# Patient Record
Sex: FEMALE | Race: BLACK OR AFRICAN AMERICAN | NOT HISPANIC OR LATINO
[De-identification: names, ages, dates, MRNs, and addresses within clinical notes are randomized per-mention and may not be internally consistent; named-entity substitution may affect disease eponyms.]

---

## 2017-01-20 ENCOUNTER — APPOINTMENT (OUTPATIENT)
Dept: INTERNAL MEDICINE | Facility: CLINIC | Age: 72
End: 2017-01-20

## 2017-11-27 ENCOUNTER — LABORATORY RESULT (OUTPATIENT)
Age: 72
End: 2017-11-27

## 2017-11-27 ENCOUNTER — APPOINTMENT (OUTPATIENT)
Dept: INTERNAL MEDICINE | Facility: CLINIC | Age: 72
End: 2017-11-27

## 2017-11-27 ENCOUNTER — APPOINTMENT (OUTPATIENT)
Dept: INTERNAL MEDICINE | Facility: CLINIC | Age: 72
End: 2017-11-27
Payer: MEDICARE

## 2017-11-27 VITALS
OXYGEN SATURATION: 98 % | HEIGHT: 66 IN | SYSTOLIC BLOOD PRESSURE: 190 MMHG | HEART RATE: 91 BPM | BODY MASS INDEX: 38.25 KG/M2 | DIASTOLIC BLOOD PRESSURE: 110 MMHG | WEIGHT: 238 LBS | TEMPERATURE: 97.9 F

## 2017-11-27 DIAGNOSIS — K59.09 OTHER CONSTIPATION: ICD-10-CM

## 2017-11-27 PROCEDURE — 90662 IIV NO PRSV INCREASED AG IM: CPT

## 2017-11-27 PROCEDURE — G0439: CPT

## 2017-11-27 PROCEDURE — 36415 COLL VENOUS BLD VENIPUNCTURE: CPT

## 2017-11-27 PROCEDURE — G0008: CPT

## 2017-11-28 ENCOUNTER — MEDICATION RENEWAL (OUTPATIENT)
Age: 72
End: 2017-11-28

## 2017-11-28 DIAGNOSIS — R31.29 OTHER MICROSCOPIC HEMATURIA: ICD-10-CM

## 2017-11-28 LAB
25(OH)D3 SERPL-MCNC: 22.9 NG/ML
APPEARANCE: ABNORMAL
BILIRUBIN URINE: NEGATIVE
BLOOD URINE: ABNORMAL
CHOLEST SERPL-MCNC: 162 MG/DL
CHOLEST/HDLC SERPL: 3.6 RATIO
COLOR: YELLOW
GLUCOSE QUALITATIVE U: NEGATIVE MG/DL
HBA1C MFR BLD HPLC: 5.9 %
HDLC SERPL-MCNC: 45 MG/DL
KETONES URINE: NEGATIVE
LDLC SERPL CALC-MCNC: 93 MG/DL
LEUKOCYTE ESTERASE URINE: NEGATIVE
NITRITE URINE: NEGATIVE
PH URINE: 8.5
PROTEIN URINE: 100 MG/DL
SPECIFIC GRAVITY URINE: 1.01
TRIGL SERPL-MCNC: 121 MG/DL
TSH SERPL-ACNC: 3.35 UIU/ML
UROBILINOGEN URINE: 1 MG/DL
VIT B12 SERPL-MCNC: 717 PG/ML

## 2017-12-08 LAB
ALBUMIN SERPL ELPH-MCNC: 4.1 G/DL
ALP BLD-CCNC: 191 U/L
ALT SERPL-CCNC: 21 U/L
ANION GAP SERPL CALC-SCNC: 17 MMOL/L
AST SERPL-CCNC: 18 U/L
BASOPHILS # BLD AUTO: 0.02 K/UL
BASOPHILS NFR BLD AUTO: 0.3 %
BILIRUB SERPL-MCNC: 0.5 MG/DL
BUN SERPL-MCNC: 11 MG/DL
CALCIUM SERPL-MCNC: 9.5 MG/DL
CHLORIDE SERPL-SCNC: 102 MMOL/L
CO2 SERPL-SCNC: 26 MMOL/L
CREAT SERPL-MCNC: 0.95 MG/DL
EOSINOPHIL # BLD AUTO: 0.38 K/UL
EOSINOPHIL NFR BLD AUTO: 5.9 %
GLUCOSE SERPL-MCNC: 112 MG/DL
HCT VFR BLD CALC: 40.6 %
HGB BLD-MCNC: 12.9 G/DL
IMM GRANULOCYTES NFR BLD AUTO: 0 %
LYMPHOCYTES # BLD AUTO: 1.59 K/UL
LYMPHOCYTES NFR BLD AUTO: 24.8 %
MAN DIFF?: NORMAL
MCHC RBC-ENTMCNC: 26.9 PG
MCHC RBC-ENTMCNC: 31.8 GM/DL
MCV RBC AUTO: 84.8 FL
MONOCYTES # BLD AUTO: 0.45 K/UL
MONOCYTES NFR BLD AUTO: 7 %
NEUTROPHILS # BLD AUTO: 3.98 K/UL
NEUTROPHILS NFR BLD AUTO: 62 %
PLATELET # BLD AUTO: 299 K/UL
POTASSIUM SERPL-SCNC: 4 MMOL/L
PROT SERPL-MCNC: 8.1 G/DL
RBC # BLD: 4.79 M/UL
RBC # FLD: 15.8 %
SODIUM SERPL-SCNC: 145 MMOL/L
WBC # FLD AUTO: 6.42 K/UL

## 2019-01-09 ENCOUNTER — APPOINTMENT (OUTPATIENT)
Dept: INTERNAL MEDICINE | Facility: CLINIC | Age: 74
End: 2019-01-09
Payer: MEDICARE

## 2019-01-09 VITALS
BODY MASS INDEX: 38.25 KG/M2 | WEIGHT: 238 LBS | DIASTOLIC BLOOD PRESSURE: 84 MMHG | HEIGHT: 66 IN | HEART RATE: 93 BPM | SYSTOLIC BLOOD PRESSURE: 130 MMHG | OXYGEN SATURATION: 97 %

## 2019-01-09 DIAGNOSIS — R21 RASH AND OTHER NONSPECIFIC SKIN ERUPTION: ICD-10-CM

## 2019-01-09 PROCEDURE — G0444 DEPRESSION SCREEN ANNUAL: CPT | Mod: 59

## 2019-01-09 PROCEDURE — G0439: CPT

## 2019-01-09 PROCEDURE — 36415 COLL VENOUS BLD VENIPUNCTURE: CPT

## 2019-01-09 PROCEDURE — G0008: CPT

## 2019-01-09 PROCEDURE — 90662 IIV NO PRSV INCREASED AG IM: CPT

## 2019-01-09 NOTE — HISTORY OF PRESENT ILLNESS
[Spouse] : spouse [FreeTextEntry1] : rash on rt arm and shoulder and upper back x 2 -3 yrs \par -itching - started [place] \[place] - has not used any creams etc \par \par c/o pain in left knee x 6-7 months no trauma , swelling + in evening \par - pain worse with walking and climbing stairs \par \par Hypertensive -\par -home readings 130-140/80 \par -  compliant with medication and diet, no cp, sob, no palpitations. No dizzy spells.\par \par  increase urine frequency \par -no burning or dysuria \par -limiting Po fluids after 7 pm \par \par Hyperlipidemia --on atorvastatin 20 mg compliant with medications and diet,. Denies any muscle pain. \par \par Cva 2012 rt hemeparesis-- stopped going to  [facility] minimal physical activity now , gained weight, used to socialize there , and helps her strengthen her body. Now drives car by self. Does arthritis exercise. Walks with cane \par Gained weight \par All are senior citizens. Now walking without cane as much as possible.\par Adls basic and instrumental are coming back - doing things by self, banking, shopping, driving. Lives with . @ daughters in [place] , son in [place]. \par \par pre dm -eats rice daily , no bread , potato and fruits , has decreased physical activity \par  \par

## 2019-01-09 NOTE — ASSESSMENT
[FreeTextEntry1] : \par  rash rt arm shoulder and upper back \par - ? bed bugs \par - to see dermatology \par - trial of clobetasone cream \par \par left knee pain \par - get xray knee r/o OA \par - Pt referral if no help ortho \par - takes as needed alleve \par \par increased urine frequency \par - check UA and C/S \par - reduce fluid intake and caffine intake in evening \par \par History of CVA with right hemiparesis -stable, patient trying to be independent as much as possible, walking with walker , continue current medication advice to be physically active as much as possible. advised to join  [employer] programs- will need acces a ride help for transportation as next [facility]Us stop is 2 blocks and cannot walk long distances \par -continue aspirin, educated patient to have a better blood pressure control.\par \par Hypertension -elevated today reading confirmed 140/86 - as did not take am medications , home readings as per pt 130-140/80 \par - continue current medications, low sodium-DASH diet, Educated patient on avoiding canned food process food fast food, including 3-4 servings of fruits and vegetables a day.\par \par Hyperlipidemia\par Controlled, continue current medications, check lipid levels\par Low-fat diet, avoid red meat, cheese, butter, peanuts and exercise daily for 40 minutes.\par \par lower back pain-stable, advised patient to lose weight, exercise daily \par \par Pre Dm- increase Physical activity and eat low carb diet , loose weight , check AIC \par \par Health maintainance \par Flu vaccination - given today \par Colonoscope 6/2014 \par Mammogram-2014, referral given.again \par Tetanus vaccine- 2014\par Pneumovax -2014\par Prevnar 13 - given 2015\par zostavax -pt will check with insurance for coverage and let me know next visit. \par

## 2019-01-09 NOTE — HEALTH RISK ASSESSMENT
[Fair] :  ~his/her~ mood as fair [No falls in past year] : Patient reported no falls in the past year [0] : 2) Feeling down, depressed, or hopeless: Not at all (0) [None] : None [With Significant Other] : lives with significant other [Retired] : retired [] :  [Fully functional (bathing, dressing, toileting, transferring, walking, feeding)] : Fully functional (bathing, dressing, toileting, transferring, walking, feeding) [Fully functional (using the telephone, shopping, preparing meals, housekeeping, doing laundry, using] : Fully functional and needs no help or supervision to perform IADLs (using the telephone, shopping, preparing meals, housekeeping, doing laundry, using transportation, managing medications and managing finances) [] : No [de-identified] : none  [[id number]] : 0 [Reports changes in hearing] : Reports no changes in hearing [Reports changes in vision] : Reports no changes in vision [Reports changes in dental health] : Reports no changes in dental health

## 2019-01-10 ENCOUNTER — RX RENEWAL (OUTPATIENT)
Age: 74
End: 2019-01-10

## 2019-01-11 LAB
25(OH)D3 SERPL-MCNC: 20.7 NG/ML
ALBUMIN SERPL ELPH-MCNC: 4 G/DL
ALP BLD-CCNC: 186 U/L
ALT SERPL-CCNC: 14 U/L
ANION GAP SERPL CALC-SCNC: 14 MMOL/L
AST SERPL-CCNC: 14 U/L
BASOPHILS # BLD AUTO: 0.03 K/UL
BASOPHILS NFR BLD AUTO: 0.4 %
BILIRUB SERPL-MCNC: 0.4 MG/DL
BUN SERPL-MCNC: 19 MG/DL
CALCIUM SERPL-MCNC: 9.2 MG/DL
CHLORIDE SERPL-SCNC: 104 MMOL/L
CHOLEST SERPL-MCNC: 117 MG/DL
CHOLEST/HDLC SERPL: 3.2 RATIO
CO2 SERPL-SCNC: 23 MMOL/L
CREAT SERPL-MCNC: 0.98 MG/DL
EOSINOPHIL # BLD AUTO: 0.42 K/UL
EOSINOPHIL NFR BLD AUTO: 6.1 %
ESTIMATED AVERAGE GLUCOSE: 134 MG/DL
GLUCOSE SERPL-MCNC: 138 MG/DL
HBA1C MFR BLD HPLC: 6.3 %
HCT VFR BLD CALC: 40 %
HDLC SERPL-MCNC: 37 MG/DL
HGB BLD-MCNC: 12.6 G/DL
IMM GRANULOCYTES NFR BLD AUTO: 0.1 %
LDLC SERPL CALC-MCNC: 64 MG/DL
LYMPHOCYTES # BLD AUTO: 2 K/UL
LYMPHOCYTES NFR BLD AUTO: 29 %
MAN DIFF?: NORMAL
MCHC RBC-ENTMCNC: 27.2 PG
MCHC RBC-ENTMCNC: 31.5 GM/DL
MCV RBC AUTO: 86.4 FL
MONOCYTES # BLD AUTO: 0.38 K/UL
MONOCYTES NFR BLD AUTO: 5.5 %
NEUTROPHILS # BLD AUTO: 4.06 K/UL
NEUTROPHILS NFR BLD AUTO: 58.9 %
PLATELET # BLD AUTO: 302 K/UL
POTASSIUM SERPL-SCNC: 3.7 MMOL/L
PROT SERPL-MCNC: 7.5 G/DL
RBC # BLD: 4.63 M/UL
RBC # FLD: 15.9 %
SODIUM SERPL-SCNC: 141 MMOL/L
TRIGL SERPL-MCNC: 82 MG/DL
TSH SERPL-ACNC: 2.61 UIU/ML
VIT B12 SERPL-MCNC: 642 PG/ML
WBC # FLD AUTO: 6.9 K/UL

## 2019-01-13 ENCOUNTER — FORM ENCOUNTER (OUTPATIENT)
Age: 74
End: 2019-01-13

## 2019-01-13 DIAGNOSIS — R93.89 ABNORMAL FINDINGS ON DIAGNOSTIC IMAGING OF OTHER SPECIFIED BODY STRUCTURES: ICD-10-CM

## 2019-01-14 ENCOUNTER — APPOINTMENT (OUTPATIENT)
Dept: RADIOLOGY | Facility: IMAGING CENTER | Age: 74
End: 2019-01-14
Payer: MEDICARE

## 2019-01-14 ENCOUNTER — APPOINTMENT (OUTPATIENT)
Dept: DERMATOLOGY | Facility: CLINIC | Age: 74
End: 2019-01-14
Payer: MEDICARE

## 2019-01-14 ENCOUNTER — OUTPATIENT (OUTPATIENT)
Dept: OUTPATIENT SERVICES | Facility: HOSPITAL | Age: 74
LOS: 1 days | End: 2019-01-14
Payer: MEDICARE

## 2019-01-14 ENCOUNTER — APPOINTMENT (OUTPATIENT)
Dept: MAMMOGRAPHY | Facility: IMAGING CENTER | Age: 74
End: 2019-01-14
Payer: MEDICARE

## 2019-01-14 VITALS — DIASTOLIC BLOOD PRESSURE: 80 MMHG | SYSTOLIC BLOOD PRESSURE: 140 MMHG

## 2019-01-14 DIAGNOSIS — Z00.8 ENCOUNTER FOR OTHER GENERAL EXAMINATION: ICD-10-CM

## 2019-01-14 DIAGNOSIS — M25.562 PAIN IN LEFT KNEE: ICD-10-CM

## 2019-01-14 DIAGNOSIS — L30.9 DERMATITIS, UNSPECIFIED: ICD-10-CM

## 2019-01-14 PROCEDURE — 77063 BREAST TOMOSYNTHESIS BI: CPT

## 2019-01-14 PROCEDURE — 73564 X-RAY EXAM KNEE 4 OR MORE: CPT | Mod: 26,LT

## 2019-01-14 PROCEDURE — 99203 OFFICE O/P NEW LOW 30 MIN: CPT

## 2019-01-14 PROCEDURE — 77067 SCR MAMMO BI INCL CAD: CPT | Mod: 26

## 2019-01-14 PROCEDURE — 73564 X-RAY EXAM KNEE 4 OR MORE: CPT

## 2019-01-14 PROCEDURE — 77067 SCR MAMMO BI INCL CAD: CPT

## 2019-01-14 PROCEDURE — 77063 BREAST TOMOSYNTHESIS BI: CPT | Mod: 26

## 2019-01-14 NOTE — PHYSICAL EXAM
[Alert] : alert [Oriented x 3] : ~L oriented x 3 [Well Nourished] : well nourished [Conjunctiva Non-injected] : conjunctiva non-injected [No Visual Lymphadenopathy] : no visual  lymphadenopathy [No Clubbing] : no clubbing [No Edema] : no edema [No Chromhidrosis] : no chromhidrosis [No Bromhidrosis] : no bromhidrosis [FreeTextEntry3] : - Scaly brown round plaques scattered on lateral arms and upper back\par - Diffuse moderate xerosis

## 2019-01-14 NOTE — HISTORY OF PRESENT ILLNESS
[FreeTextEntry1] : new pt: rash [de-identified] : 74 y/o F w/ rash on upper body present for 4-5 years which is pruritic. Has used a variety of OTC products on the skin including Jergens Ultra Healing lotion. 1 week ago was started on clobetasol which led to some improvement but continues to get itching several hours after using the medicine. No hx of eczema or cervical disc disease.

## 2019-01-14 NOTE — CONSULT LETTER
[Dear  ___] : Dear  [unfilled], [Consult Letter:] : I had the pleasure of evaluating your patient, [unfilled]. [Please see my note below.] : Please see my note below. [Consult Closing:] : Thank you very much for allowing me to participate in the care of this patient.  If you have any questions, please do not hesitate to contact me. [Sincerely,] : Sincerely, [FreeTextEntry3] : Luis Gan MD\par [facility]

## 2019-01-24 PROBLEM — R93.89 NODULAR RADIOLOGIC DENSITY: Status: ACTIVE | Noted: 2019-01-24

## 2019-02-19 ENCOUNTER — APPOINTMENT (OUTPATIENT)
Dept: ORTHOPEDIC SURGERY | Facility: CLINIC | Age: 74
End: 2019-02-19
Payer: MEDICARE

## 2019-02-19 VITALS
BODY MASS INDEX: 28.32 KG/M2 | WEIGHT: 170 LBS | HEART RATE: 64 BPM | HEIGHT: 65 IN | SYSTOLIC BLOOD PRESSURE: 145 MMHG | DIASTOLIC BLOOD PRESSURE: 73 MMHG

## 2019-02-19 DIAGNOSIS — M25.562 PAIN IN LEFT KNEE: ICD-10-CM

## 2019-02-19 PROCEDURE — 99204 OFFICE O/P NEW MOD 45 MIN: CPT

## 2019-02-20 PROBLEM — M25.562 LEFT KNEE PAIN: Status: ACTIVE | Noted: 2019-01-09

## 2019-03-11 ENCOUNTER — APPOINTMENT (OUTPATIENT)
Dept: DERMATOLOGY | Facility: CLINIC | Age: 74
End: 2019-03-11

## 2019-06-19 ENCOUNTER — MEDICATION RENEWAL (OUTPATIENT)
Age: 74
End: 2019-06-19

## 2019-08-20 ENCOUNTER — NON-APPOINTMENT (OUTPATIENT)
Age: 74
End: 2019-08-20

## 2019-08-20 ENCOUNTER — APPOINTMENT (OUTPATIENT)
Dept: INTERNAL MEDICINE | Facility: CLINIC | Age: 74
End: 2019-08-20
Payer: MEDICARE

## 2019-08-20 VITALS
DIASTOLIC BLOOD PRESSURE: 82 MMHG | OXYGEN SATURATION: 98 % | HEART RATE: 86 BPM | HEIGHT: 65 IN | TEMPERATURE: 98.2 F | SYSTOLIC BLOOD PRESSURE: 130 MMHG | BODY MASS INDEX: 41.32 KG/M2 | WEIGHT: 248 LBS | RESPIRATION RATE: 16 BRPM

## 2019-08-20 DIAGNOSIS — J30.9 ALLERGIC RHINITIS, UNSPECIFIED: ICD-10-CM

## 2019-08-20 PROCEDURE — 93000 ELECTROCARDIOGRAM COMPLETE: CPT

## 2019-08-20 PROCEDURE — 36415 COLL VENOUS BLD VENIPUNCTURE: CPT

## 2019-08-20 PROCEDURE — 99214 OFFICE O/P EST MOD 30 MIN: CPT | Mod: 25

## 2019-08-20 NOTE — ASSESSMENT
[FreeTextEntry1] : 72 yo F pmhx preDM, HTN, HLD, CVA 2012 (with right sided weakness, uses walker), LBP with lumbar DDD, vit d insuff here for f/u\par \par ? hx wheezing- not consistent with wheezing by hx, suspect postnasal drip given hx rhinitis; well appearing and VSS\par -trial of flonase prn and monitor (denies hx glaucoma)\par -advised prompt medical eval if active wheezing (lung noise a/w sob)\par \par HTN- c/o LE edema (R>L), sedentary, hx chronic exertional fatigue- BP wnl\par -EKG: NSR @ 72 bpm, +LVH, nl axis, no path Q, nonspecific T changes (no chg c/w 6/13)\par -check LE duplex to r/o DVT\par -check labs: cbc/cmp\par -cardio referral for eval\par -optho referral for screening (no hx prior per pt)\par -low salt diet advised\par -leg elevation as able\par -advised prompt ER eval if worsened sx's, dizziness, CP, palpitations, sob, etc.\par \par preDM- 1/19 A1c 6.3\par -ADA diet advised\par \par \par HCM\par -hx CPE 1/19 with PMD Dr. Puri\par \par Best contact #, home: [phone]

## 2019-08-20 NOTE — REVIEW OF SYSTEMS
[Negative] : Psychiatric [Fever] : no fever [Chills] : no chills [Chest Pain] : no chest pain [Leg Claudication] : no leg claudication [Palpitations] : no palpitations [Orthopnea] : no orthopnea [Paroysmal Nocturnal Dyspnea] : no paroysmal nocturnal dyspnea [Cough] : no cough [Dyspnea on Exertion] : no dyspnea on exertion [Abdominal Pain] : no abdominal pain [Melena] : no melena [Dizziness] : no dizziness [Dysuria] : no dysuria [FreeTextEntry5] : see HPI [FreeTextEntry6] : see HPI [FreeTextEntry7] : see HPI

## 2019-08-20 NOTE — PHYSICAL EXAM
[No Acute Distress] : no acute distress [Well-Appearing] : well-appearing [Normal Sclera/Conjunctiva] : normal sclera/conjunctiva [PERRL] : pupils equal round and reactive to light [EOMI] : extraocular movements intact [Normal Outer Ear/Nose] : the outer ears and nose were normal in appearance [Normal Nasal Mucosa] : the nasal mucosa was normal [Supple] : supple [No Lymphadenopathy] : no lymphadenopathy [Thyroid Normal, No Nodules] : the thyroid was normal and there were no nodules present [No Respiratory Distress] : no respiratory distress  [Clear to Auscultation] : lungs were clear to auscultation bilaterally [Normal Rate] : normal rate  [Regular Rhythm] : with a regular rhythm [Normal S1, S2] : normal S1 and S2 [No Murmur] : no murmur heard [No Varicosities] : no varicosities [Pedal Pulses Present] : the pedal pulses are present [Soft] : abdomen soft [Non Tender] : non-tender [No HSM] : no HSM [No CVA Tenderness] : no CVA  tenderness [No Spinal Tenderness] : no spinal tenderness [No Joint Swelling] : no joint swelling [No Rash] : no rash [Normal Affect] : the affect was normal [Alert and Oriented x3] : oriented to person, place, and time [de-identified] : poor visualization of posterior pharynx; no sinus tenderness [de-identified] : +1 pitting edema pedal to below knee (R>L), no palpable cord, negative Marti's sign [de-identified] : slow gait with walker, moving all extremities

## 2019-08-20 NOTE — HISTORY OF PRESENT ILLNESS
[de-identified] : \par Accompanied by .\par \par 72 yo F pmhx preDM, HTN, HLD, CVA 2012 (with right sided weakness, uses walker), LBP with lumbar DDD, vit d insuff here for f/u\par \par Last seen in office by PMD 1/19 for CPE.\par \par Told recently by son (who is a plastic surgeon) that was wheezing and should be seen by PMD ~ 1 mo ago.  Does not recall specifics of that time.  \par -Pt states on rare occasion when lying down trying to sleep, hears "sound" in throat, but does not feel anything or have sob at time.  Onset ~ 1x/mo- thinks onset chronic\par -Per  states heard "whistling sounds" while pt was sitting at [employer] last week, pt states was feeling well at time w/o sob or discomfort\par -denies hx seasonal allergies, but +hx rhinitis near dust\par -denies throat pain, frequent throat clearing or reflux\par \par c/o LE swelling (R>L) x 1 mo, on/off, denies hx prior to then\par -feels onset correlates with increased high salt intake- recently cut down\par -noted on awakening, no change by end of the day, does not elevate legs\par -denies calf pain with walking or recent travel\par -is mostly sedentary in recent months\par -reports chronic hx exertional fatigue- stable since CVA in 2012-\par -denies fever, chills, GARCIA, cough, palpitations, CP or dizziness\par -denies hx CAD, stress test or prior cadiology evaluation\par \par Reports increased wt since last visit with PMD 1/19- attributes to being more sedentary, not going to [employer] since spring 2019- usually does water walking there but not going as many young children using pool currently.  Plans to return 9/2019 when less kids.  \par Tries to eat healthy diet, low carb\par No recent falls.\par No new meds since CVA 2012.\par \par Reports nl appetite.  hx constipation- goes qod; better with eating more veggies and occasional using OTC stool softening pill with help; Denies n/v/abd pain; BRBPR or melena\par \par Denies new  complaints, hx frequency since CVA- stable.\par

## 2019-08-21 LAB
ALBUMIN SERPL ELPH-MCNC: 4.3 G/DL
ALP BLD-CCNC: 169 U/L
ALT SERPL-CCNC: 24 U/L
ANION GAP SERPL CALC-SCNC: 14 MMOL/L
AST SERPL-CCNC: 16 U/L
BASOPHILS # BLD AUTO: 0.04 K/UL
BASOPHILS NFR BLD AUTO: 0.7 %
BILIRUB SERPL-MCNC: 0.4 MG/DL
BUN SERPL-MCNC: 15 MG/DL
CALCIUM SERPL-MCNC: 9.2 MG/DL
CHLORIDE SERPL-SCNC: 105 MMOL/L
CO2 SERPL-SCNC: 24 MMOL/L
CREAT SERPL-MCNC: 0.93 MG/DL
EOSINOPHIL # BLD AUTO: 0.41 K/UL
EOSINOPHIL NFR BLD AUTO: 7.2 %
ESTIMATED AVERAGE GLUCOSE: 126 MG/DL
GLUCOSE SERPL-MCNC: 97 MG/DL
HBA1C MFR BLD HPLC: 6 %
HCT VFR BLD CALC: 38.7 %
HGB BLD-MCNC: 12.3 G/DL
IMM GRANULOCYTES NFR BLD AUTO: 0.3 %
LYMPHOCYTES # BLD AUTO: 1.83 K/UL
LYMPHOCYTES NFR BLD AUTO: 32 %
MAN DIFF?: NORMAL
MCHC RBC-ENTMCNC: 27.7 PG
MCHC RBC-ENTMCNC: 31.8 GM/DL
MCV RBC AUTO: 87.2 FL
MONOCYTES # BLD AUTO: 0.62 K/UL
MONOCYTES NFR BLD AUTO: 10.8 %
NEUTROPHILS # BLD AUTO: 2.8 K/UL
NEUTROPHILS NFR BLD AUTO: 49 %
PLATELET # BLD AUTO: 266 K/UL
POTASSIUM SERPL-SCNC: 3.9 MMOL/L
PROT SERPL-MCNC: 7.2 G/DL
RBC # BLD: 4.44 M/UL
RBC # FLD: 16.1 %
SODIUM SERPL-SCNC: 143 MMOL/L
WBC # FLD AUTO: 5.72 K/UL

## 2019-08-30 ENCOUNTER — APPOINTMENT (OUTPATIENT)
Dept: INTERNAL MEDICINE | Facility: CLINIC | Age: 74
End: 2019-08-30

## 2019-09-19 ENCOUNTER — NON-APPOINTMENT (OUTPATIENT)
Age: 74
End: 2019-09-19

## 2019-09-19 ENCOUNTER — APPOINTMENT (OUTPATIENT)
Dept: OPHTHALMOLOGY | Facility: CLINIC | Age: 74
End: 2019-09-19
Payer: MEDICARE

## 2019-09-19 PROCEDURE — 92004 COMPRE OPH EXAM NEW PT 1/>: CPT

## 2019-09-23 ENCOUNTER — NON-APPOINTMENT (OUTPATIENT)
Age: 74
End: 2019-09-23

## 2019-09-23 ENCOUNTER — APPOINTMENT (OUTPATIENT)
Dept: CARDIOLOGY | Facility: CLINIC | Age: 74
End: 2019-09-23
Payer: MEDICARE

## 2019-09-23 VITALS
HEIGHT: 65 IN | HEART RATE: 74 BPM | WEIGHT: 248 LBS | DIASTOLIC BLOOD PRESSURE: 80 MMHG | SYSTOLIC BLOOD PRESSURE: 130 MMHG | OXYGEN SATURATION: 98 % | BODY MASS INDEX: 41.32 KG/M2

## 2019-09-23 DIAGNOSIS — M79.89 OTHER SPECIFIED SOFT TISSUE DISORDERS: ICD-10-CM

## 2019-09-23 PROCEDURE — 93000 ELECTROCARDIOGRAM COMPLETE: CPT

## 2019-09-23 PROCEDURE — 99204 OFFICE O/P NEW MOD 45 MIN: CPT

## 2019-10-01 ENCOUNTER — APPOINTMENT (OUTPATIENT)
Dept: CARDIOLOGY | Facility: CLINIC | Age: 74
End: 2019-10-01
Payer: MEDICARE

## 2019-10-01 PROCEDURE — 93925 LOWER EXTREMITY STUDY: CPT

## 2019-10-01 PROCEDURE — 93880 EXTRACRANIAL BILAT STUDY: CPT

## 2019-10-14 ENCOUNTER — MED ADMIN CHARGE (OUTPATIENT)
Age: 74
End: 2019-10-14

## 2019-10-14 ENCOUNTER — APPOINTMENT (OUTPATIENT)
Dept: CARDIOLOGY | Facility: CLINIC | Age: 74
End: 2019-10-14
Payer: MEDICARE

## 2019-10-14 PROCEDURE — A9500: CPT

## 2019-10-14 PROCEDURE — 78451 HT MUSCLE IMAGE SPECT SING: CPT

## 2019-10-14 PROCEDURE — 93015 CV STRESS TEST SUPVJ I&R: CPT

## 2019-10-14 RX ORDER — REGADENOSON 0.08 MG/ML
0.4 INJECTION, SOLUTION INTRAVENOUS
Qty: 1 | Refills: 0 | Status: COMPLETED | OUTPATIENT
Start: 2019-10-14

## 2019-10-14 RX ADMIN — REGADENOSON 4 MG/5ML: 0.08 INJECTION, SOLUTION INTRAVENOUS at 00:00

## 2019-10-21 ENCOUNTER — APPOINTMENT (OUTPATIENT)
Dept: CARDIOLOGY | Facility: CLINIC | Age: 74
End: 2019-10-21

## 2019-11-19 ENCOUNTER — APPOINTMENT (OUTPATIENT)
Dept: CARDIOLOGY | Facility: CLINIC | Age: 74
End: 2019-11-19
Payer: MEDICARE

## 2019-11-19 VITALS
BODY MASS INDEX: 39.49 KG/M2 | WEIGHT: 237 LBS | DIASTOLIC BLOOD PRESSURE: 70 MMHG | SYSTOLIC BLOOD PRESSURE: 120 MMHG | OXYGEN SATURATION: 98 % | HEART RATE: 78 BPM | HEIGHT: 65 IN

## 2019-11-19 DIAGNOSIS — R07.9 CHEST PAIN, UNSPECIFIED: ICD-10-CM

## 2019-11-19 PROCEDURE — 99214 OFFICE O/P EST MOD 30 MIN: CPT

## 2019-11-19 PROCEDURE — 93306 TTE W/DOPPLER COMPLETE: CPT

## 2019-12-09 PROBLEM — R07.9 CHEST PAIN: Status: ACTIVE | Noted: 2019-10-10

## 2019-12-09 NOTE — PHYSICAL EXAM
[Normal Appearance] : normal appearance [General Appearance - Well Developed] : well developed [Well Groomed] : well groomed [General Appearance - Well Nourished] : well nourished [No Deformities] : no deformities [General Appearance - In No Acute Distress] : no acute distress [Normal Conjunctiva] : the conjunctiva exhibited no abnormalities [Eyelids - No Xanthelasma] : the eyelids demonstrated no xanthelasmas [No Oral Pallor] : no oral pallor [Normal Oral Mucosa] : normal oral mucosa [No Oral Cyanosis] : no oral cyanosis [Normal Jugular Venous A Waves Present] : normal jugular venous A waves present [Normal Jugular Venous V Waves Present] : normal jugular venous V waves present [No Jugular Venous Her A Waves] : no jugular venous her A waves [Normal Rhythm/Effort] : normal respiratory rhythm and effort [Clear Bilaterally] : the lungs were clear to auscultation bilaterally [Normal] : palpation of the chest was normal [Normal to Percussion] : the lungs were normal to percussion [Normal Rate] : normal [Normal S1] : normal S1 [Normal S2] : normal S2 [No Murmur] : no murmurs heard [2+] : left 2+ [No Abnormalities] : the abdominal aorta was not enlarged and no bruit was heard [No Pitting Edema] : no pitting edema present [Abdomen Soft] : soft [Abdomen Tenderness] : non-tender [Abdomen Mass (___ Cm)] : no abdominal mass palpated [Gait - Sufficient For Exercise Testing] : the gait was sufficient for exercise testing [Abnormal Walk] : normal gait [Nail Clubbing] : no clubbing of the fingernails [Cyanosis, Localized] : no localized cyanosis [Petechial Hemorrhages (___cm)] : no petechial hemorrhages [Skin Color & Pigmentation] : normal skin color and pigmentation [] : no rash [No Venous Stasis] : no venous stasis [Skin Lesions] : no skin lesions [No Skin Ulcers] : no skin ulcer [No Xanthoma] : no  xanthoma was observed [Oriented To Time, Place, And Person] : oriented to person, place, and time [Affect] : the affect was normal [Mood] : the mood was normal [No Anxiety] : not feeling anxious [S3] : no S3 [S4] : no S4 [Right Carotid Bruit] : no bruit heard over the right carotid [Left Carotid Bruit] : no bruit heard over the left carotid [Right Femoral Bruit] : no bruit heard over the right femoral artery [Left Femoral Bruit] : no bruit heard over the left femoral artery

## 2019-12-09 NOTE — DISCUSSION/SUMMARY
[FreeTextEntry1] : 1)Cardiac: no further episodes of chest pain.  Echo and stress test nl.\par -cont asa\par -risk factor modifications\par 2)HTN; BP remains at goal\par 3)HL: on statin and tolerating it well\par 4)Pre-DM: as per PMD\par -diet and exercise

## 2019-12-09 NOTE — HISTORY OF PRESENT ILLNESS
[FreeTextEntry1] : Kaitlin 75yo lady with a PMH of HTN, HL and DM; here for evaluation of chest pain.\par Echo: LVEF 55% with mild MR and TR.\par Nuclear stress test: LVEF 55%; no ischemia or scar.\par No episodes since initial event.

## 2019-12-13 ENCOUNTER — NON-APPOINTMENT (OUTPATIENT)
Age: 74
End: 2019-12-13

## 2019-12-13 PROBLEM — M79.89 LEG SWELLING: Status: ACTIVE | Noted: 2019-08-20

## 2019-12-13 NOTE — DISCUSSION/SUMMARY
[FreeTextEntry1] : 1)Cardiac: chest pain and LE edema.  \par -cont asa\par -2D echo\par -nuclear stress test\par -risk factor modifications\par 2)HTN; BP  at goal\par 3)HL: on statin and tolerating it well\par 4)Pre-DM: as per PMD\par -diet and exercise

## 2019-12-13 NOTE — HISTORY OF PRESENT ILLNESS
[FreeTextEntry1] : Kaitlin 75yo lady with a PMH of HTN, HL and DM; here for evaluation of chest pain and LE edema for the past few months. \par

## 2019-12-13 NOTE — PHYSICAL EXAM
[Well Groomed] : well groomed [Normal Appearance] : normal appearance [General Appearance - Well Developed] : well developed [No Deformities] : no deformities [General Appearance - Well Nourished] : well nourished [General Appearance - In No Acute Distress] : no acute distress [Normal Conjunctiva] : the conjunctiva exhibited no abnormalities [Normal Oral Mucosa] : normal oral mucosa [Eyelids - No Xanthelasma] : the eyelids demonstrated no xanthelasmas [No Oral Pallor] : no oral pallor [No Oral Cyanosis] : no oral cyanosis [Normal Jugular Venous A Waves Present] : normal jugular venous A waves present [Normal Jugular Venous V Waves Present] : normal jugular venous V waves present [No Jugular Venous Her A Waves] : no jugular venous her A waves [Abdomen Soft] : soft [Abdomen Tenderness] : non-tender [Abdomen Mass (___ Cm)] : no abdominal mass palpated [Abnormal Walk] : normal gait [Gait - Sufficient For Exercise Testing] : the gait was sufficient for exercise testing [Cyanosis, Localized] : no localized cyanosis [Nail Clubbing] : no clubbing of the fingernails [Skin Color & Pigmentation] : normal skin color and pigmentation [Petechial Hemorrhages (___cm)] : no petechial hemorrhages [Skin Lesions] : no skin lesions [No Venous Stasis] : no venous stasis [] : no rash [No Skin Ulcers] : no skin ulcer [No Xanthoma] : no  xanthoma was observed [Oriented To Time, Place, And Person] : oriented to person, place, and time [Affect] : the affect was normal [Normal Rhythm/Effort] : normal respiratory rhythm and effort [No Anxiety] : not feeling anxious [Mood] : the mood was normal [Clear Bilaterally] : the lungs were clear to auscultation bilaterally [Normal to Percussion] : the lungs were normal to percussion [Normal] : palpation of the chest was normal [Normal Rate] : normal [Normal S2] : normal S2 [Normal S1] : normal S1 [No Murmur] : no murmurs heard [No Abnormalities] : the abdominal aorta was not enlarged and no bruit was heard [2+] : left 2+ [No Pitting Edema] : no pitting edema present [S3] : no S3 [S4] : no S4 [Right Carotid Bruit] : no bruit heard over the right carotid [Left Carotid Bruit] : no bruit heard over the left carotid [Right Femoral Bruit] : no bruit heard over the right femoral artery [Left Femoral Bruit] : no bruit heard over the left femoral artery

## 2020-07-13 ENCOUNTER — LABORATORY RESULT (OUTPATIENT)
Age: 75
End: 2020-07-13

## 2020-07-13 ENCOUNTER — APPOINTMENT (OUTPATIENT)
Dept: INTERNAL MEDICINE | Facility: CLINIC | Age: 75
End: 2020-07-13
Payer: MEDICARE

## 2020-07-13 VITALS
DIASTOLIC BLOOD PRESSURE: 80 MMHG | HEART RATE: 105 BPM | OXYGEN SATURATION: 98 % | TEMPERATURE: 98.2 F | SYSTOLIC BLOOD PRESSURE: 135 MMHG

## 2020-07-13 DIAGNOSIS — Z11.59 ENCOUNTER FOR SCREENING FOR OTHER VIRAL DISEASES: ICD-10-CM

## 2020-07-13 PROCEDURE — G0444 DEPRESSION SCREEN ANNUAL: CPT | Mod: 59

## 2020-07-13 PROCEDURE — G0439: CPT

## 2020-07-13 PROCEDURE — 36415 COLL VENOUS BLD VENIPUNCTURE: CPT

## 2020-07-13 NOTE — HISTORY OF PRESENT ILLNESS
[de-identified] : \par came in for annual check up\par \par Lost son 11/2019 - commited Suicide- got very emotional and upset " you are not suppose to ask me about my son "- crying - does npt want to discuss, claims she is not depressed \par \par Hypertensive -\par -saw cardio 9/2019 -Echo: LVEF 55% with mild MR and TR.\par Nuclear stress test: LVEF 55%; no ischemia or scar.\par -home readings 130-140/80 \par - compliant with medication and diet, no cp, sob, no palpitations. No dizzy spells.\par \par  increase urine frequency \par -no burning or dysuria \par -limiting Po fluids after 7 pm \par \par Hyperlipidemia --on atorvastatin 20 mg compliant with medications and diet,. Denies any muscle pain. \par \par Cva 2012 rt hemeparesis-- stopped going to [employer] minimal physical activity now , gained weight, used to socialize there , and helps her strengthen her body.  helping her - drives to [employer] . Does arthritis exercise. Walks with rollator now \par Gained weight \par All are senior citizens. Now walking with walker rollator as much as possible.\par Adls basic and instrumental are coming back - doing things by self, banking, shopping, driving. Lives with . @ daughters in [place] , son was in [place]. \par \par pre dm -eats rice daily , no bread , potato and fruits , has decreased physical activity \par

## 2020-07-13 NOTE — ASSESSMENT
[FreeTextEntry1] : Grief - lost son 11/2019 \par \par RAsh arms/ Exczema  - trial of clobetasone cream  \par \par History of CVA with right hemiparesis -stable, patient trying to be independent as much as possible, walking with walker , continue current medication advice to be physically active as much as possible. advised to join [employer] programs- will need acces a ride help for transportation as next [facility] stop is 2 blocks and cannot walk long distances \par -continue aspirin, educated patient to have a better blood pressure control.\par \par Hypertension -\par - continue current medications, low sodium-DASH diet, Educated patient on avoiding canned food process food fast food, including 3-4 servings of fruits and vegetables a day.\par \par Hyperlipidemia\par Controlled, continue current medications, check lipid levels\par Low-fat diet, avoid red meat, cheese, butter, peanuts and exercise daily for 40 minutes.\par \par lower back pain-stable, advised patient to lose weight, exercise daily \par \par Pre Dm- increase Physical activity and eat low carb diet , loose weight , check AIC \par \par Health maintainance \par Flu vaccination - 2019\par Colonoscope 6/2014 \par Mammogram-1/2019 Bi rad 2 \par Tetanus vaccine- 2014\par Pneumovax -2014\par Prevnar 13 - given 2015\par zostavax -pt will check with insurance for coverage and let me know next visit. \par

## 2020-07-13 NOTE — HEALTH RISK ASSESSMENT
[Fair] :  ~his/her~ mood as fair [No] : No [No falls in past year] : Patient reported no falls in the past year [0] : 2) Feeling down, depressed, or hopeless: Not at all (0) [None] : None [Retired] : retired [With Significant Other] : lives with significant other [] :  [Feels Safe at Home] : Feels safe at home [Fully functional (using the telephone, shopping, preparing meals, housekeeping, doing laundry, using] : Fully functional and needs no help or supervision to perform IADLs (using the telephone, shopping, preparing meals, housekeeping, doing laundry, using transportation, managing medications and managing finances) [] : No [[id number]] : 0 [de-identified] : walking  [Reports changes in vision] : Reports no changes in vision [Reports changes in hearing] : Reports no changes in hearing [de-identified] : needs help

## 2020-07-13 NOTE — PHYSICAL EXAM
[No Acute Distress] : no acute distress [Well Nourished] : well nourished [Well Developed] : well developed [Well-Appearing] : well-appearing [PERRL] : pupils equal round and reactive to light [Normal Sclera/Conjunctiva] : normal sclera/conjunctiva [EOMI] : extraocular movements intact [Normal Outer Ear/Nose] : the outer ears and nose were normal in appearance [Normal Oropharynx] : the oropharynx was normal [No JVD] : no jugular venous distention [No Lymphadenopathy] : no lymphadenopathy [Thyroid Normal, No Nodules] : the thyroid was normal and there were no nodules present [Supple] : supple [No Respiratory Distress] : no respiratory distress  [No Accessory Muscle Use] : no accessory muscle use [Normal Rate] : normal rate  [Clear to Auscultation] : lungs were clear to auscultation bilaterally [Regular Rhythm] : with a regular rhythm [Normal S1, S2] : normal S1 and S2 [No Murmur] : no murmur heard [No Carotid Bruits] : no carotid bruits [No Varicosities] : no varicosities [No Abdominal Bruit] : a ~M bruit was not heard ~T in the abdomen [No Edema] : there was no peripheral edema [Pedal Pulses Present] : the pedal pulses are present [No Palpable Aorta] : no palpable aorta [No Extremity Clubbing/Cyanosis] : no extremity clubbing/cyanosis [Soft] : abdomen soft [Non Tender] : non-tender [Non-distended] : non-distended [No Masses] : no abdominal mass palpated [No HSM] : no HSM [Normal Bowel Sounds] : normal bowel sounds [Normal Anterior Cervical Nodes] : no anterior cervical lymphadenopathy [Normal Posterior Cervical Nodes] : no posterior cervical lymphadenopathy [No CVA Tenderness] : no CVA  tenderness [No Joint Swelling] : no joint swelling [Grossly Normal Strength/Tone] : grossly normal strength/tone [No Spinal Tenderness] : no spinal tenderness [No Rash] : no rash [Coordination Grossly Intact] : coordination grossly intact [No Focal Deficits] : no focal deficits [Normal Affect] : the affect was normal [Normal Gait] : normal gait [Deep Tendon Reflexes (DTR)] : deep tendon reflexes were 2+ and symmetric [Normal Insight/Judgement] : insight and judgment were intact

## 2020-07-15 LAB
25(OH)D3 SERPL-MCNC: 25.3 NG/ML
ALBUMIN SERPL ELPH-MCNC: 4.4 G/DL
ALP BLD-CCNC: 180 U/L
ALT SERPL-CCNC: 34 U/L
ANION GAP SERPL CALC-SCNC: 14 MMOL/L
APPEARANCE: ABNORMAL
AST SERPL-CCNC: 25 U/L
BASOPHILS # BLD AUTO: 0.06 K/UL
BASOPHILS NFR BLD AUTO: 0.7 %
BILIRUB SERPL-MCNC: 0.5 MG/DL
BILIRUBIN URINE: NEGATIVE
BLOOD URINE: NEGATIVE
BUN SERPL-MCNC: 22 MG/DL
CALCIUM SERPL-MCNC: 9.3 MG/DL
CHLORIDE SERPL-SCNC: 103 MMOL/L
CHOLEST SERPL-MCNC: 132 MG/DL
CHOLEST/HDLC SERPL: 3.6 RATIO
CO2 SERPL-SCNC: 23 MMOL/L
COLOR: NORMAL
CREAT SERPL-MCNC: 1.65 MG/DL
EOSINOPHIL # BLD AUTO: 0.68 K/UL
EOSINOPHIL NFR BLD AUTO: 8 %
ESTIMATED AVERAGE GLUCOSE: 134 MG/DL
GLUCOSE QUALITATIVE U: NEGATIVE
GLUCOSE SERPL-MCNC: 137 MG/DL
HBA1C MFR BLD HPLC: 6.3 %
HCT VFR BLD CALC: 39.6 %
HDLC SERPL-MCNC: 36 MG/DL
HGB BLD-MCNC: 12.6 G/DL
IMM GRANULOCYTES NFR BLD AUTO: 0.2 %
KETONES URINE: NEGATIVE
LDLC SERPL CALC-MCNC: 74 MG/DL
LEUKOCYTE ESTERASE URINE: NEGATIVE
LYMPHOCYTES # BLD AUTO: 1.87 K/UL
LYMPHOCYTES NFR BLD AUTO: 22.1 %
MAN DIFF?: NORMAL
MCHC RBC-ENTMCNC: 27.5 PG
MCHC RBC-ENTMCNC: 31.8 GM/DL
MCV RBC AUTO: 86.3 FL
MONOCYTES # BLD AUTO: 0.74 K/UL
MONOCYTES NFR BLD AUTO: 8.8 %
NEUTROPHILS # BLD AUTO: 5.08 K/UL
NEUTROPHILS NFR BLD AUTO: 60.2 %
NITRITE URINE: NEGATIVE
PH URINE: 6
PLATELET # BLD AUTO: 284 K/UL
POTASSIUM SERPL-SCNC: 3.8 MMOL/L
PROT SERPL-MCNC: 7.8 G/DL
PROTEIN URINE: NEGATIVE
RBC # BLD: 4.59 M/UL
RBC # FLD: 15.4 %
SARS-COV-2 IGG SERPL IA-ACNC: <3.8 AU/ML
SARS-COV-2 IGG SERPL QL IA: NEGATIVE
SODIUM SERPL-SCNC: 140 MMOL/L
SPECIFIC GRAVITY URINE: 1.01
TRIGL SERPL-MCNC: 106 MG/DL
TSH SERPL-ACNC: 2.87 UIU/ML
UROBILINOGEN URINE: NORMAL
VIT B12 SERPL-MCNC: 1173 PG/ML
WBC # FLD AUTO: 8.45 K/UL

## 2021-03-26 ENCOUNTER — APPOINTMENT (OUTPATIENT)
Dept: INTERNAL MEDICINE | Facility: CLINIC | Age: 76
End: 2021-03-26
Payer: MEDICARE

## 2021-03-26 VITALS
HEART RATE: 100 BPM | DIASTOLIC BLOOD PRESSURE: 82 MMHG | SYSTOLIC BLOOD PRESSURE: 144 MMHG | HEIGHT: 65 IN | TEMPERATURE: 97.8 F | WEIGHT: 243 LBS | OXYGEN SATURATION: 98 % | BODY MASS INDEX: 40.48 KG/M2

## 2021-03-26 PROCEDURE — 36415 COLL VENOUS BLD VENIPUNCTURE: CPT

## 2021-03-26 PROCEDURE — 99214 OFFICE O/P EST MOD 30 MIN: CPT | Mod: 25

## 2021-03-26 NOTE — ASSESSMENT
[FreeTextEntry1] : Grief/ depression PHQ-9 score 6 mild \par  - lost son 11/2019 \par - reacess next visit \par \par gait and balance disturbance- PT referral given \par \par History of CVA with right hemiparesis -stable, patient trying to be independent as much as possible, walking with walker , continue current medication advice to be physically active as much as possible. advised to join [employer] programs- will need acces a ride help for transportation as next BUs stop is 2 blocks and cannot walk long distances \par -continue aspirin, educated patient to have a better blood pressure control.\par -lipitor increased to 40 -rtc 3 months redo LFT / lipids \par \par Hypertension -130/80 today rx refilled \par - continue current medications, low sodium-DASH diet, Educated patient on avoiding canned food process food fast food, including 3-4 servings of fruits and vegetables a day.\par \par Hyperlipidemia--Lipitor increased to 40 -rtc 3 months redo LFT / lipids \par Controlled, continue current medications, check lipid levels\par Low-fat diet, avoid red meat, cheese, butter, peanuts and exercise daily for 40 minutes.\par \par lower back pain-stable, advised patient to lose weight, exercise daily \par \par Pre Dm- increase Physical activity and eat low carb diet , loose weight , check AIC \par \par Health maintenance \par Flu vaccination - 2019\par Colonoscope 6/2014 \par Mammogram/2019 Bi rad 2 - ordered \par Dexa- ordered \par Tetanus vaccine- 2014\par Pneumovax -2014\par Prevnar 13 - given 2015\par zostavax -pt will check with insurance for coverage and let me know next visit. \par pfizer 3/5/21, 3/19/21

## 2021-03-26 NOTE — HISTORY OF PRESENT ILLNESS
[Other: _____] : [unfilled] [de-identified] : f/u on ch medical issues \par \par Grief / depression -Lost son 11/2019 - commited Suicide- doing better now , no SI/HI, lives with spouse \par daughter lives near by 10 minutes away \par \par Hypertensive -\par -saw cardio 9/2019 -Echo: LVEF 55% with mild MR and TR.\par Nuclear stress test: LVEF 55%; no ischemia or scar.\par -home readings 130-140/80 \par - compliant with medication and diet, no cp, sob, no palpitations. No dizzy spells.\par \par Hyperlipidemia --on atorvastatin 20 mg compliant with medications and diet,. Denies any muscle pain. 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  helping her - drives to [employer]. Does arthritis exercise. Walks with rollator now \par Gained weight \par All are senior citizens. Now walking with walker rollator as much as possible.- has balance issues - legs crumbled on her weight while getting out of bed last week no trauma to head -  helped her get up - now walking with walker no problem \par -daughter helps with IADLS \par - Lives with . @ daughters in [place] , \par \par pre dm -eats rice daily , no bread , potato and fruits , has decreased physical activity \par

## 2021-03-29 ENCOUNTER — NON-APPOINTMENT (OUTPATIENT)
Age: 76
End: 2021-03-29

## 2021-03-29 LAB
ALBUMIN SERPL ELPH-MCNC: 4.5 G/DL
ALP BLD-CCNC: 166 U/L
ALT SERPL-CCNC: 44 U/L
ANION GAP SERPL CALC-SCNC: 13 MMOL/L
AST SERPL-CCNC: 27 U/L
BILIRUB SERPL-MCNC: 0.4 MG/DL
BUN SERPL-MCNC: 19 MG/DL
CALCIUM SERPL-MCNC: 9.8 MG/DL
CHLORIDE SERPL-SCNC: 101 MMOL/L
CHOLEST SERPL-MCNC: 144 MG/DL
CO2 SERPL-SCNC: 24 MMOL/L
CREAT SERPL-MCNC: 0.98 MG/DL
ESTIMATED AVERAGE GLUCOSE: 137 MG/DL
GLUCOSE SERPL-MCNC: 110 MG/DL
HBA1C MFR BLD HPLC: 6.4 %
HDLC SERPL-MCNC: 48 MG/DL
LDLC SERPL CALC-MCNC: 81 MG/DL
NONHDLC SERPL-MCNC: 96 MG/DL
POTASSIUM SERPL-SCNC: 4 MMOL/L
PROT SERPL-MCNC: 7.8 G/DL
SODIUM SERPL-SCNC: 138 MMOL/L
TRIGL SERPL-MCNC: 75 MG/DL

## 2021-03-29 RX ORDER — GABAPENTIN 300 MG/1
300 CAPSULE ORAL
Qty: 1 | Refills: 2 | Status: DISCONTINUED | COMMUNITY
Start: 2019-01-14 | End: 2021-03-29

## 2021-03-29 RX ORDER — FLUTICASONE PROPIONATE 50 UG/1
50 SPRAY, METERED NASAL
Qty: 1 | Refills: 1 | Status: DISCONTINUED | COMMUNITY
Start: 2019-08-20 | End: 2021-03-29

## 2021-03-29 RX ORDER — CLOBETASOL PROPIONATE 0.5 MG/G
0.05 CREAM TOPICAL TWICE DAILY
Qty: 1 | Refills: 1 | Status: DISCONTINUED | COMMUNITY
Start: 2019-01-09 | End: 2021-03-29

## 2021-04-26 ENCOUNTER — RESULT REVIEW (OUTPATIENT)
Age: 76
End: 2021-04-26

## 2021-04-26 ENCOUNTER — APPOINTMENT (OUTPATIENT)
Dept: RADIOLOGY | Facility: IMAGING CENTER | Age: 76
End: 2021-04-26
Payer: MEDICARE

## 2021-04-26 ENCOUNTER — APPOINTMENT (OUTPATIENT)
Dept: MAMMOGRAPHY | Facility: IMAGING CENTER | Age: 76
End: 2021-04-26
Payer: MEDICARE

## 2021-04-26 ENCOUNTER — OUTPATIENT (OUTPATIENT)
Dept: OUTPATIENT SERVICES | Facility: HOSPITAL | Age: 76
LOS: 1 days | End: 2021-04-26
Payer: MEDICARE

## 2021-04-26 DIAGNOSIS — Z00.00 ENCOUNTER FOR GENERAL ADULT MEDICAL EXAMINATION WITHOUT ABNORMAL FINDINGS: ICD-10-CM

## 2021-04-26 PROCEDURE — 77067 SCR MAMMO BI INCL CAD: CPT | Mod: 26

## 2021-04-26 PROCEDURE — 77080 DXA BONE DENSITY AXIAL: CPT | Mod: 26

## 2021-04-26 PROCEDURE — 77063 BREAST TOMOSYNTHESIS BI: CPT | Mod: 26

## 2021-04-26 PROCEDURE — 77063 BREAST TOMOSYNTHESIS BI: CPT

## 2021-04-26 PROCEDURE — 77067 SCR MAMMO BI INCL CAD: CPT

## 2021-04-26 PROCEDURE — 77080 DXA BONE DENSITY AXIAL: CPT

## 2021-06-24 ENCOUNTER — APPOINTMENT (OUTPATIENT)
Dept: INTERNAL MEDICINE | Facility: CLINIC | Age: 76
End: 2021-06-24

## 2021-06-24 DIAGNOSIS — R73.03 PREDIABETES.: ICD-10-CM

## 2021-12-10 ENCOUNTER — APPOINTMENT (OUTPATIENT)
Dept: INTERNAL MEDICINE | Facility: CLINIC | Age: 76
End: 2021-12-10
Payer: MEDICARE

## 2021-12-10 VITALS
WEIGHT: 235 LBS | DIASTOLIC BLOOD PRESSURE: 96 MMHG | HEART RATE: 112 BPM | OXYGEN SATURATION: 98 % | SYSTOLIC BLOOD PRESSURE: 164 MMHG | BODY MASS INDEX: 39.11 KG/M2 | TEMPERATURE: 98 F

## 2021-12-10 VITALS — DIASTOLIC BLOOD PRESSURE: 88 MMHG | SYSTOLIC BLOOD PRESSURE: 144 MMHG

## 2021-12-10 PROCEDURE — G0439: CPT

## 2021-12-10 NOTE — ASSESSMENT
[FreeTextEntry1] : Hypertension:\par Elevated, but out of some meds\par New Rx with refills sent in for all meds.  Given printout of med list.  \par \par Hyperlipidemia:\par No statin for about 1 month, refill sent in\par \par H/o CVA, weakness:\par Continues daily asa, restart BP meds and statin\par s/p PT with some improvement, was considering \par \par HM:\par Breast cancer screening, mammo - 4/2021, normal\par Cervical cancer screening - pap\par CRS - colonoscopy 2014, repeat due (5-7 yr), declines.  Will consider FOBIT, supplies given. \par Bone density  4/2021, normal \par Dental - UTD, has full dentures \par tdap - 2014\par Pneumococcal vaccination - PPSV 2014, PCV 2015\par Shingrix vaccine - will consider, info provided to get at local pharmacy\par Flu shot today\par COVID vaccine - series of 2 + booster\par check labs\par

## 2021-12-10 NOTE — HISTORY OF PRESENT ILLNESS
[de-identified] : 76 y.o. female, PMHx CVA with hemiparesis, hypertension, hyperlipidemia, back pain, constipation.  \par \par Wants to do "[employer] Sne[employer]" program.  Did PT, who recommended she follow up with that program, however has been told she needs a prescription.  Told them I am unaware of prescription that is needed.  Daughter called while in the office found that she does not have medicare advantage and is not covered.  \par \par Walks with walker.  Gets assistance from  with house work, food prep, shopping.  Does self care on her own.  \par \par Thinks some improvement in balance from PT.  Does home exercises on occasion, but not regularly.  \par \par Currently only taking losartan, triamterine-hctz and aspirin, out of other meds.  Thought PCP had stopped them.  \par

## 2021-12-10 NOTE — HEALTH RISK ASSESSMENT
[Good] : ~his/her~  mood as  good [No falls in past year] : Patient reported no falls in the past year [0] : 2) Feeling down, depressed, or hopeless: Not at all (0) [With Significant Other] : lives with significant other [Fully functional (bathing, dressing, toileting, transferring, walking, feeding)] : Fully functional (bathing, dressing, toileting, transferring, walking, feeding) [With Patient/Caregiver] : , with patient/caregiver [] : No [No] : In the past 12 months have you used drugs other than those required for medical reasons? No [PHQ-2 Negative - No further assessment needed] : PHQ-2 Negative - No further assessment needed [[id number]] : 0 [Independent] : managing medications [Some assistance needed] : managing finances [Full assistance needed] : using transportation [Reports changes in hearing] : Reports no changes in hearing [Reports changes in vision] : Reports no changes in vision [Reports changes in dental health] : Reports no changes in dental health [AdvancecareDate] : 12/2021 [FreeTextEntry4] : will discuss further with daughter and , used to be son her who has passed away

## 2021-12-15 ENCOUNTER — LABORATORY RESULT (OUTPATIENT)
Age: 76
End: 2021-12-15

## 2022-04-11 PROBLEM — Z11.59 SCREENING FOR VIRAL DISEASE: Status: ACTIVE | Noted: 2020-07-13

## 2022-06-06 ENCOUNTER — NON-APPOINTMENT (OUTPATIENT)
Age: 77
End: 2022-06-06

## 2022-06-06 ENCOUNTER — INPATIENT (INPATIENT)
Facility: HOSPITAL | Age: 77
LOS: 7 days | Discharge: NURSING, REHAB OR LONG-TERM CARE | End: 2022-06-14
Attending: INTERNAL MEDICINE | Admitting: INTERNAL MEDICINE
Payer: MEDICARE

## 2022-06-06 VITALS
WEIGHT: 279.99 LBS | OXYGEN SATURATION: 99 % | DIASTOLIC BLOOD PRESSURE: 70 MMHG | HEART RATE: 110 BPM | TEMPERATURE: 104 F | SYSTOLIC BLOOD PRESSURE: 120 MMHG | HEIGHT: 67 IN | RESPIRATION RATE: 24 BRPM

## 2022-06-06 DIAGNOSIS — N17.9 ACUTE KIDNEY FAILURE, UNSPECIFIED: ICD-10-CM

## 2022-06-06 DIAGNOSIS — E11.9 TYPE 2 DIABETES MELLITUS WITHOUT COMPLICATIONS: ICD-10-CM

## 2022-06-06 DIAGNOSIS — E87.6 HYPOKALEMIA: ICD-10-CM

## 2022-06-06 DIAGNOSIS — E78.5 HYPERLIPIDEMIA, UNSPECIFIED: ICD-10-CM

## 2022-06-06 DIAGNOSIS — E87.2 ACIDOSIS: ICD-10-CM

## 2022-06-06 DIAGNOSIS — Z29.9 ENCOUNTER FOR PROPHYLACTIC MEASURES, UNSPECIFIED: ICD-10-CM

## 2022-06-06 DIAGNOSIS — G93.41 METABOLIC ENCEPHALOPATHY: ICD-10-CM

## 2022-06-06 DIAGNOSIS — N39.0 URINARY TRACT INFECTION, SITE NOT SPECIFIED: ICD-10-CM

## 2022-06-06 DIAGNOSIS — I10 ESSENTIAL (PRIMARY) HYPERTENSION: ICD-10-CM

## 2022-06-06 LAB
ALBUMIN SERPL ELPH-MCNC: 2.5 G/DL — LOW (ref 3.3–5)
ALP SERPL-CCNC: 149 U/L — HIGH (ref 40–120)
ALT FLD-CCNC: 42 U/L — SIGNIFICANT CHANGE UP (ref 12–78)
ANION GAP SERPL CALC-SCNC: 11 MMOL/L — SIGNIFICANT CHANGE UP (ref 5–17)
APPEARANCE UR: ABNORMAL
APTT BLD: 26.2 SEC — LOW (ref 27.5–35.5)
AST SERPL-CCNC: 49 U/L — HIGH (ref 15–37)
BACTERIA # UR AUTO: ABNORMAL
BASOPHILS # BLD AUTO: 0.16 K/UL — SIGNIFICANT CHANGE UP (ref 0–0.2)
BASOPHILS NFR BLD AUTO: 0.8 % — SIGNIFICANT CHANGE UP (ref 0–2)
BILIRUB SERPL-MCNC: 1.1 MG/DL — SIGNIFICANT CHANGE UP (ref 0.2–1.2)
BILIRUB UR-MCNC: NEGATIVE — SIGNIFICANT CHANGE UP
BUN SERPL-MCNC: 42 MG/DL — HIGH (ref 7–23)
CALCIUM SERPL-MCNC: 8.9 MG/DL — SIGNIFICANT CHANGE UP (ref 8.5–10.1)
CHLORIDE SERPL-SCNC: 97 MMOL/L — SIGNIFICANT CHANGE UP (ref 96–108)
CO2 SERPL-SCNC: 24 MMOL/L — SIGNIFICANT CHANGE UP (ref 22–31)
COLOR SPEC: YELLOW — SIGNIFICANT CHANGE UP
CREAT SERPL-MCNC: 2.47 MG/DL — HIGH (ref 0.5–1.3)
DIFF PNL FLD: ABNORMAL
EGFR: 20 ML/MIN/1.73M2 — LOW
EOSINOPHIL # BLD AUTO: 0.01 K/UL — SIGNIFICANT CHANGE UP (ref 0–0.5)
EOSINOPHIL NFR BLD AUTO: 0 % — SIGNIFICANT CHANGE UP (ref 0–6)
EPI CELLS # UR: SIGNIFICANT CHANGE UP
FLUAV AG NPH QL: SIGNIFICANT CHANGE UP
FLUBV AG NPH QL: SIGNIFICANT CHANGE UP
GLUCOSE SERPL-MCNC: 192 MG/DL — HIGH (ref 70–99)
GLUCOSE UR QL: NEGATIVE MG/DL — SIGNIFICANT CHANGE UP
HCT VFR BLD CALC: 31.7 % — LOW (ref 34.5–45)
HGB BLD-MCNC: 10.8 G/DL — LOW (ref 11.5–15.5)
IMM GRANULOCYTES NFR BLD AUTO: 0.8 % — SIGNIFICANT CHANGE UP (ref 0–1.5)
INR BLD: 1.19 RATIO — HIGH (ref 0.88–1.16)
KETONES UR-MCNC: NEGATIVE — SIGNIFICANT CHANGE UP
LACTATE SERPL-SCNC: 2.2 MMOL/L — HIGH (ref 0.7–2)
LACTATE SERPL-SCNC: 2.6 MMOL/L — HIGH (ref 0.7–2)
LEUKOCYTE ESTERASE UR-ACNC: ABNORMAL
LYMPHOCYTES # BLD AUTO: 0.82 K/UL — LOW (ref 1–3.3)
LYMPHOCYTES # BLD AUTO: 3.9 % — LOW (ref 13–44)
MAGNESIUM SERPL-MCNC: 2.3 MG/DL — SIGNIFICANT CHANGE UP (ref 1.6–2.6)
MCHC RBC-ENTMCNC: 27.1 PG — SIGNIFICANT CHANGE UP (ref 27–34)
MCHC RBC-ENTMCNC: 34.1 G/DL — SIGNIFICANT CHANGE UP (ref 32–36)
MCV RBC AUTO: 79.4 FL — LOW (ref 80–100)
MONOCYTES # BLD AUTO: 1.16 K/UL — HIGH (ref 0–0.9)
MONOCYTES NFR BLD AUTO: 5.5 % — SIGNIFICANT CHANGE UP (ref 2–14)
NEUTROPHILS # BLD AUTO: 18.87 K/UL — HIGH (ref 1.8–7.4)
NEUTROPHILS NFR BLD AUTO: 89 % — HIGH (ref 43–77)
NITRITE UR-MCNC: NEGATIVE — SIGNIFICANT CHANGE UP
NRBC # BLD: 0 /100 WBCS — SIGNIFICANT CHANGE UP (ref 0–0)
NT-PROBNP SERPL-SCNC: 1502 PG/ML — HIGH (ref 0–450)
PH UR: 6 — SIGNIFICANT CHANGE UP (ref 5–8)
PLATELET # BLD AUTO: 233 K/UL — SIGNIFICANT CHANGE UP (ref 150–400)
POTASSIUM SERPL-MCNC: 3.2 MMOL/L — LOW (ref 3.5–5.3)
POTASSIUM SERPL-SCNC: 3.2 MMOL/L — LOW (ref 3.5–5.3)
PROT SERPL-MCNC: 7.7 GM/DL — SIGNIFICANT CHANGE UP (ref 6–8.3)
PROT UR-MCNC: 100 MG/DL
PROTHROM AB SERPL-ACNC: 14.2 SEC — HIGH (ref 10.5–13.4)
RBC # BLD: 3.99 M/UL — SIGNIFICANT CHANGE UP (ref 3.8–5.2)
RBC # FLD: 15.9 % — HIGH (ref 10.3–14.5)
RBC CASTS # UR COMP ASSIST: ABNORMAL /HPF (ref 0–4)
SARS-COV-2 RNA SPEC QL NAA+PROBE: SIGNIFICANT CHANGE UP
SODIUM SERPL-SCNC: 132 MMOL/L — LOW (ref 135–145)
SP GR SPEC: 1.01 — SIGNIFICANT CHANGE UP (ref 1.01–1.02)
TROPONIN I, HIGH SENSITIVITY RESULT: 23.7 NG/L — SIGNIFICANT CHANGE UP
UROBILINOGEN FLD QL: 1 MG/DL
WBC # BLD: 21.18 K/UL — HIGH (ref 3.8–10.5)
WBC # FLD AUTO: 21.18 K/UL — HIGH (ref 3.8–10.5)
WBC UR QL: >50

## 2022-06-06 PROCEDURE — 73562 X-RAY EXAM OF KNEE 3: CPT | Mod: 26,LT

## 2022-06-06 PROCEDURE — 71045 X-RAY EXAM CHEST 1 VIEW: CPT | Mod: 26

## 2022-06-06 PROCEDURE — 74176 CT ABD & PELVIS W/O CONTRAST: CPT | Mod: 26,MA

## 2022-06-06 PROCEDURE — 99285 EMERGENCY DEPT VISIT HI MDM: CPT

## 2022-06-06 PROCEDURE — 93010 ELECTROCARDIOGRAM REPORT: CPT

## 2022-06-06 PROCEDURE — 99223 1ST HOSP IP/OBS HIGH 75: CPT

## 2022-06-06 PROCEDURE — 73552 X-RAY EXAM OF FEMUR 2/>: CPT | Mod: 26,LT

## 2022-06-06 PROCEDURE — 99222 1ST HOSP IP/OBS MODERATE 55: CPT

## 2022-06-06 PROCEDURE — 73502 X-RAY EXAM HIP UNI 2-3 VIEWS: CPT | Mod: 26,LT

## 2022-06-06 PROCEDURE — 70450 CT HEAD/BRAIN W/O DYE: CPT | Mod: 26,MA

## 2022-06-06 RX ORDER — ATORVASTATIN CALCIUM 80 MG/1
40 TABLET, FILM COATED ORAL AT BEDTIME
Refills: 0 | Status: DISCONTINUED | OUTPATIENT
Start: 2022-06-06 | End: 2022-06-14

## 2022-06-06 RX ORDER — ACETAMINOPHEN 500 MG
650 TABLET ORAL ONCE
Refills: 0 | Status: COMPLETED | OUTPATIENT
Start: 2022-06-06 | End: 2022-06-06

## 2022-06-06 RX ORDER — HEPARIN SODIUM 5000 [USP'U]/ML
5000 INJECTION INTRAVENOUS; SUBCUTANEOUS EVERY 12 HOURS
Refills: 0 | Status: DISCONTINUED | OUTPATIENT
Start: 2022-06-06 | End: 2022-06-14

## 2022-06-06 RX ORDER — SODIUM CHLORIDE 9 MG/ML
1000 INJECTION, SOLUTION INTRAVENOUS
Refills: 0 | Status: COMPLETED | OUTPATIENT
Start: 2022-06-06 | End: 2022-06-11

## 2022-06-06 RX ORDER — CEFTRIAXONE 500 MG/1
1000 INJECTION, POWDER, FOR SOLUTION INTRAMUSCULAR; INTRAVENOUS EVERY 24 HOURS
Refills: 0 | Status: DISCONTINUED | OUTPATIENT
Start: 2022-06-06 | End: 2022-06-08

## 2022-06-06 RX ORDER — POTASSIUM CHLORIDE 20 MEQ
10 PACKET (EA) ORAL
Refills: 0 | Status: COMPLETED | OUTPATIENT
Start: 2022-06-06 | End: 2022-06-06

## 2022-06-06 RX ORDER — AMLODIPINE BESYLATE 2.5 MG/1
10 TABLET ORAL DAILY
Refills: 0 | Status: DISCONTINUED | OUTPATIENT
Start: 2022-06-06 | End: 2022-06-13

## 2022-06-06 RX ORDER — CEFTRIAXONE 500 MG/1
2000 INJECTION, POWDER, FOR SOLUTION INTRAMUSCULAR; INTRAVENOUS ONCE
Refills: 0 | Status: COMPLETED | OUTPATIENT
Start: 2022-06-06 | End: 2022-06-06

## 2022-06-06 RX ORDER — SODIUM CHLORIDE 9 MG/ML
2500 INJECTION, SOLUTION INTRAVENOUS ONCE
Refills: 0 | Status: COMPLETED | OUTPATIENT
Start: 2022-06-06 | End: 2022-06-06

## 2022-06-06 RX ADMIN — HEPARIN SODIUM 5000 UNIT(S): 5000 INJECTION INTRAVENOUS; SUBCUTANEOUS at 18:07

## 2022-06-06 RX ADMIN — ATORVASTATIN CALCIUM 40 MILLIGRAM(S): 80 TABLET, FILM COATED ORAL at 21:51

## 2022-06-06 RX ADMIN — Medication 100 MILLIEQUIVALENT(S): at 20:51

## 2022-06-06 RX ADMIN — Medication 100 MILLIEQUIVALENT(S): at 19:31

## 2022-06-06 RX ADMIN — CEFTRIAXONE 100 MILLIGRAM(S): 500 INJECTION, POWDER, FOR SOLUTION INTRAMUSCULAR; INTRAVENOUS at 16:48

## 2022-06-06 RX ADMIN — Medication 100 MILLIEQUIVALENT(S): at 18:07

## 2022-06-06 RX ADMIN — Medication 650 MILLIGRAM(S): at 18:22

## 2022-06-06 RX ADMIN — Medication 650 MILLIGRAM(S): at 16:49

## 2022-06-06 RX ADMIN — SODIUM CHLORIDE 2500 MILLILITER(S): 9 INJECTION, SOLUTION INTRAVENOUS at 16:48

## 2022-06-06 NOTE — CONSULT NOTE ADULT - NS ATTEND AMEND GEN_ALL_CORE FT
Had dysuria and increased frequency for few days. Admitted with AMS. Elevated WBCs, Creatinine. CT scan: mild right hydronephrosis , no obstruction. hydronephrosis most likely secondary to Infection.   Continue anti biotics.

## 2022-06-06 NOTE — ED PROVIDER NOTE - PHYSICAL EXAMINATION
Gen: Alert, NAD, oriented to self only, Obese,  Head: NC, AT   Eyes: PERRL, EOMI, normal lids/conjunctiva  ENT: normal hearing, patent oropharynx without erythema/exudate, uvula midline  Neck: supple, no tenderness, Trachea midline  Pulm: Bilateral BS, normal resp effort, no wheeze/stridor/retractions  CV: RRR, no M/R/G, 2+ radial and dp pulses bl, no edema  Abd: soft, NT/ND, +BS, no hepatosplenomegaly  : Left labial externalized growth about 3x2cm, more nodular than fluctuant  Mskel: extremities x4 with normal ROM and no joint effusions. no ctl spine ttp.   Skin: no rash, no bruising   Neuro: AAOx3, no sensory/motor deficits, CN 2-12 intact

## 2022-06-06 NOTE — H&P ADULT - NSHPREVIEWOFSYSTEMS_GEN_ALL_CORE
Constitutional: no fever, chills, night sweats  Ears: no hearing changes or ear pain,   Nose: no nasal congestion, sinus pain, or rhinorrhea  Cardio: no chest pain, orthopnea, edema, or palpitations  Resp: no dyspnea, cough, wheezing  GI: no nausea, vomiting, diarrhea, constipation, hematochezia, or melena  : dysuria positive.  No urinary frequency, hematuria  MSK: no back pain, neck pain  Skin: no rash, pruritis   Neuro: weakness, dizziness positive.  No lightheadedness, syncope   Heme/Lymph: no bruising or bleeding

## 2022-06-06 NOTE — ED PROVIDER NOTE - OBJECTIVE STATEMENT
77 y/o F with PMHx of CVA (Rt sided deficits), Arthritis, presents to the ED for weakness s/p fall around 0430 this morning. As per daughter at bedside, pt fell down when getting out of the bathroom and fell on her back. Pt usually uses a walker to stand and ambulate, daughter reports pt was unable to steady herself when she sustained the fall. Endorses changes in speaking, tremors, decreased PO intake intermittent LOC but denies head-strike, fever/chills, SOB, CP, abd pain or N/V/D. PMD: Dr. Puri [phone]. Pt is fully vaccinated against COVID and boosted. Patient denies EtOH/tobacco/illicit substance use.

## 2022-06-06 NOTE — H&P ADULT - NSHPLABSRESULTS_GEN_ALL_CORE
Recent Vitals  T(C): 37.9 (22 @ 17:28), Max: 39.9 (22 @ 14:10)  HR: 94 (22 @ 17:28) (94 - 110)  BP: 120/69 (22 @ 17:28) (120/68 - 124/71)  RR: 18 (22 @ 17:28) (18 - 24)  SpO2: 95% (22 @ 17:28) (95% - 99%)                        10.8   21.18 )-----------( 233      ( 2022 15:07 )             31.7     -    132<L>  |  97  |  42<H>  ----------------------------<  192<H>  3.2<L>   |  24  |  2.47<H>    Ca    8.9      2022 15:07  Mg     2.3     -    TPro  7.7  /  Alb  2.5<L>  /  TBili  1.1  /  DBili  x   /  AST  49<H>  /  ALT  42  /  AlkPhos  149<H>  06-    PT/INR - ( 2022 15:07 )   PT: 14.2 sec;   INR: 1.19 ratio         PTT - ( 2022 15:07 )  PTT:26.2 sec  LIVER FUNCTIONS - ( 2022 15:07 )  Alb: 2.5 g/dL / Pro: 7.7 gm/dL / ALK PHOS: 149 U/L / ALT: 42 U/L / AST: 49 U/L / GGT: x           Urinalysis Basic - ( 2022 14:43 )    Color: Yellow / Appearance: very cloudy / S.010 / pH: x  Gluc: x / Ketone: Negative  / Bili: Negative / Urobili: 1 mg/dL   Blood: x / Protein: 100 mg/dL / Nitrite: Negative   Leuk Esterase: Moderate / RBC: 6-10 /HPF / WBC >50   Sq Epi: x / Non Sq Epi: Few / Bacteria: Many        Home Medications:  amLODIPine 10 mg oral tablet: 1 tab(s) orally once a day (2022 15:37)  metoprolol succinate 50 mg oral tablet, extended release: 1 tab(s) orally once a day (2022 15:37)

## 2022-06-06 NOTE — ED PROVIDER NOTE - CLINICAL SUMMARY MEDICAL DECISION MAKING FREE TEXT BOX
Suspect Urosepsis, obtain labs and give Abx. Suspect Urosepsis, obtain labs and give Abx.  I read ekg as sinus tach rate 103, no st elevation or depression, qtc 453, narrow qrs, normal axis, LVH. Suspect Urosepsis, obtain labs and give Abx. case de urology given hydro, but no sign of acute stone.   I read ekg as sinus tach rate 103, no st elevation or depression, qtc 453, narrow qrs, normal axis, LVH.

## 2022-06-06 NOTE — ED ADULT NURSE REASSESSMENT NOTE - NS ED NURSE REASSESS COMMENT FT1
Patient given medication as ordered, report  given to RN [place] as protocol and patient transported to floor

## 2022-06-06 NOTE — H&P ADULT - ASSESSMENT
Patient is a 76F with a PMH of CVA with R sided deficits, OA, HTN, HLD who presents to the ED for AMS.  Patient states that she was returning from the bathroom this morning when she suddenly felt dizzy and fell onto her back.  Patient found on the floor by family, required assistance to get back on her feet.  Family noted increased confusion after the fall and called EMS.  Patient currently has no active complaints.  Family at the bedside states she is currently at her baseline mental status.  Patient states she has had some dysuria for about a week but denies history of fever, chills, nausea, vomiting, chest pain, palpitations, cough, or dyspnea.  NKDA.  Febrile to 103.8, labs show leukocytosis and elevated creatinine.  Will admit to med surg.    IMPROVE VTE Individual Risk Assessment          RISK                                                          Points  [  ] Previous VTE                                                3  [  ] Thrombophilia                                             2  [  ] Lower limb paralysis                                    2        (unable to hold up >15 seconds)    [  ] Current Cancer                                             2         (within 6 months)  [  ] Immobilization > 24 hrs                              1  [  ] ICU/CCU stay > 24 hours                            1  [  ] Age > 60                                                    1    IMPROVE VTE Score - 1

## 2022-06-06 NOTE — H&P ADULT - HISTORY OF PRESENT ILLNESS
Patient is a 76F with a PMH of CVA with R sided deficits, OA, HTN, HLD who presents to the ED for AMS.  Patient states that she was returning from the bathroom this morning when she suddenly felt dizzy and fell onto her back.  Patient found on the floor by family, required assistance to get back on her feet.  Family noted increased confusion after the fall and called EMS.  Patient currently has no active complaints.  Family at the bedside states she is currently at her baseline mental status.  Patient states she has had some dysuria for about a week but denies history of fever, chills, nausea, vomiting, chest pain, palpitations, cough, or dyspnea.  NKDA.  Febrile to 103.8, labs show leukocytosis and elevated creatinine.  Will admit to med surg.

## 2022-06-06 NOTE — H&P ADULT - PROBLEM SELECTOR PLAN 1
WBCs and leuk esterase in urine  Started on ceftriaxone in ED.  Continue for now  Deescalate antibiotic when cultures return     PT eval in am

## 2022-06-06 NOTE — CONSULT NOTE ADULT - ASSESSMENT
87 y/o female  -  -  -  -   85 y/o female admitted with likely urospesis/pyleonephritis found to have GISEL and right hydroureteronephrois    -f/u Cxs  -abx  -trend Cr  -place ridley catheter  -will follow along  discussed with urology attending HONG Barbosa MD

## 2022-06-06 NOTE — ED ADULT NURSE NOTE - OBJECTIVE STATEMENT
Patient A&OX3, breathing even and unlabored on room air, no acute respiratory distress noted. PMHx cva, HTN, BIBA accompanied by daughter fo s/p fall, generalized weakness and decrease appetite X 2 days. Patient febrile at triage. Patient states she was trying to get up from the bathroom and fell on the ground. Denies LOC, hematuria. Labs drawn and sent and medication given as ordered.

## 2022-06-06 NOTE — ED ADULT TRIAGE NOTE - CHIEF COMPLAINT QUOTE
Patient BIBA: Patient fell at 5am on her back, no LOC: has had general weakness all days. Per daughter and EMS LOC waxing and waning. Patient responsive to voice. Patient oral temperature 103.8

## 2022-06-06 NOTE — CONSULT NOTE ADULT - SUBJECTIVE AND OBJECTIVE BOX
"77 y/o F with PMHx of CVA (Rt sided deficits), Arthritis, presents to the ED for weakness s/p fall around 0430 this morning. As per daughter at bedside, pt fell down when getting out of the bathroom and fell on her back. Pt usually uses a walker to stand and ambulate, daughter reports pt was unable to steady herself when she sustained the fall. Endorses changes in speaking, tremors, decreased PO intake intermittent LOC but denies head-strike, fever/chills, SOB, CP, abd pain or N/V/D. PMD: Dr. Puri [phone]. Pt is fully vaccinated against COVID and boosted. Patient denies EtOH/tobacco/illicit substance use"    In ED pt wih temp 103.8F and GISEL (Cr2.47)  Urology consulted for CT findings of Right hydroureteronephrosis. PT and family deny any hx of nephrolithiasis or recurrent UTI.  PT denies any recent flank pain, nausea or vomiting.  Does admit to sensation of incomplete bladder empyting. Denies any gross hematuria, or dysuria    PAST MEDICAL & SURGICAL HISTORY:  CVA (cerebrovascular accident)      Arthritis      HTN (hypertension)        FAMILY HISTORY:    SOCIAL HISTORY:   Tobacco hx:   MEDICATIONS  (STANDING):  potassium chloride  10 mEq/100 mL IVPB 10 milliEquivalent(s) IV Intermittent every 1 hour    MEDICATIONS  (PRN):    Allergies    No Known Allergies    Intolerances        REVIEW OF SYSTEMS: Pertinent positives and negatives as stated in HPI, otherwise negative    Vital signs  T(C): 37.9 (22 @ 17:28), Max: 39.9 (22 @ 14:10)  HR: 94 (22 @ 17:28)  BP: 120/69 (22 @ 17:28)  SpO2: 95% (22 @ 17:28)  Wt(kg): --    Physical Exam  Gen: NAD  HEENT: normocephalic, atraumatic, no scleral icterus  Pulm: CTA b/l, No respiratory distress, no subcostal retractions  CV: RRR, no murmur, no JVD  Abd: Soft, NT, ND, no rebound tenderness or guarding  : Uncircumcised/Circumcised, no lesions.  No discharge or blood at urethral meatus.  Testes descended bilaterally.  Testes and epididymis nontender bilaterally.  Cremasteric reflex present bilaterally.  Back: No CVAT   MSK:  Moving all extremities, full ROM in all extremities, No edema present  NEURO: A&Ox3, no focal neurological deficits, CN 2-12 grossly intact  SKIN: warm, dry, no rash.    LABS:     @ 15:07    WBC 21.18 / Hct 31.7  / SCr 2.47         132<L>  |  97  |  42<H>  ----------------------------<  192<H>  3.2<L>   |  24  |  2.47<H>    Ca    8.9      2022 15:07  Mg     2.3         TPro  7.7  /  Alb  2.5<L>  /  TBili  1.1  /  DBili  x   /  AST  49<H>  /  ALT  42  /  AlkPhos  149<H>      PT/INR - ( 2022 15:07 )   PT: 14.2 sec;   INR: 1.19 ratio         PTT - ( 2022 15:07 )  PTT:26.2 sec  Urinalysis Basic - ( 2022 14:43 )    Color: Yellow / Appearance: very cloudy / S.010 / pH: x  Gluc: x / Ketone: Negative  / Bili: Negative / Urobili: 1 mg/dL   Blood: x / Protein: 100 mg/dL / Nitrite: Negative   Leuk Esterase: Moderate / RBC: 6-10 /HPF / WBC >50   Sq Epi: x / Non Sq Epi: Few / Bacteria: Many        Urine Cx: in progress  Blood Cx: in progress    Radiology:   ACC: [id number] EXAM:  CT ABDOMEN AND PELVIS                          PROCEDURE DATE:  2022          INTERPRETATION:  CLINICAL INFORMATION: Renal failure.    COMPARISON: None.    CONTRAST/COMPLICATIONS:  IV Contrast: NONE  Oral Contrast: NONE  Complications: None reported at time of study completion    PROCEDURE:  CT of the Abdomen and Pelvis was performed.  Sagittal and coronal reformats were performed.    FINDINGS:  LOWER CHEST: Within normal limits.    LIVER: Within normal limits.  BILE DUCTS: Normal caliber.  GALLBLADDER: Not visualized, possibly surgically absent.  SPLEEN: Within normal limits.  PANCREAS: Within normal limits.  ADRENALS: Right adrenal adenoma, measuring 2.6 cm. Left adrenal   thickening.  KIDNEYS/URETERS: Mild right hydroureteronephrosis to the level of the   distal ureter without obstructing calculus.    BLADDER: Within normal limits.  REPRODUCTIVE ORGANS: Calcified and noncalcified uterine leiomyomas.    BOWEL: No bowel obstruction. Appendix is normal.  PERITONEUM: No ascites.  VESSELS: Atherosclerotic changes.  RETROPERITONEUM/LYMPH NODES: No lymphadenopathy.  ABDOMINAL WALL: Small fat-containing umbilical hernia. Small bilateral   fat-containing inguinal hernias.  BONES: Degenerative changes, greatest in the left hip.    IMPRESSION:  Mild right hydroureteronephrosis to the level of the distal ureter   without obstructing calculus, possibly due to passed stone.    --- End of Report ---        MILLER MORALES MD; Attending Radiologist  This document has been electronically signed. 2022  4:37PM     "77 y/o F with PMHx of CVA (Rt sided deficits), Arthritis, presents to the ED for weakness s/p fall around 0430 this morning. As per daughter at bedside, pt fell down when getting out of the bathroom and fell on her back. Pt usually uses a walker to stand and ambulate, daughter reports pt was unable to steady herself when she sustained the fall. Endorses changes in speaking, tremors, decreased PO intake intermittent LOC but denies head-strike, fever/chills, SOB, CP, abd pain or N/V/D. PMD: Dr. Puri [phone]. Pt is fully vaccinated against COVID and boosted. Patient denies EtOH/tobacco/illicit substance use"    In ED pt wih temp 103.8F and GISEL (Cr2.47)  Urology consulted for CT findings of Right hydroureteronephrosis. PT and family deny any hx of nephrolithiasis or recurrent UTI.  PT denies any recent flank pain, nausea or vomiting.  Does admit to sensation of incomplete bladder empyting. Denies any gross hematuria, or dysuria    PAST MEDICAL & SURGICAL HISTORY:  CVA (cerebrovascular accident)      Arthritis      HTN (hypertension)        FAMILY HISTORY:    SOCIAL HISTORY:   Tobacco hx:   MEDICATIONS  (STANDING):  potassium chloride  10 mEq/100 mL IVPB 10 milliEquivalent(s) IV Intermittent every 1 hour    MEDICATIONS  (PRN):    Allergies    No Known Allergies    Intolerances        REVIEW OF SYSTEMS: Pertinent positives and negatives as stated in HPI, otherwise negative    Vital signs  T(C): 37.9 (22 @ 17:28), Max: 39.9 (22 @ 14:10)  HR: 94 (22 @ 17:28)  BP: 120/69 (22 @ 17:28)  SpO2: 95% (22 @ 17:28)  Wt(kg): --    Physical Exam  Gen: NAD  Abd: Soft, NT, ND, no rebound tenderness or guarding  : +Bartholin cyst  Back: No CVAT       LABS:     @ 15:07    WBC 21.18 / Hct 31.7  / SCr 2.47         132<L>  |  97  |  42<H>  ----------------------------<  192<H>  3.2<L>   |  24  |  2.47<H>    Ca    8.9      2022 15:07  Mg     2.3     -    TPro  7.7  /  Alb  2.5<L>  /  TBili  1.1  /  DBili  x   /  AST  49<H>  /  ALT  42  /  AlkPhos  149<H>  06-    PT/INR - ( 2022 15:07 )   PT: 14.2 sec;   INR: 1.19 ratio         PTT - ( 2022 15:07 )  PTT:26.2 sec  Urinalysis Basic - ( 2022 14:43 )    Color: Yellow / Appearance: very cloudy / S.010 / pH: x  Gluc: x / Ketone: Negative  / Bili: Negative / Urobili: 1 mg/dL   Blood: x / Protein: 100 mg/dL / Nitrite: Negative   Leuk Esterase: Moderate / RBC: 6-10 /HPF / WBC >50   Sq Epi: x / Non Sq Epi: Few / Bacteria: Many        Urine Cx: in progress  Blood Cx: in progress    Radiology:   ACC: [id number] EXAM:  CT ABDOMEN AND PELVIS                          PROCEDURE DATE:  2022          INTERPRETATION:  CLINICAL INFORMATION: Renal failure.    COMPARISON: None.    CONTRAST/COMPLICATIONS:  IV Contrast: NONE  Oral Contrast: NONE  Complications: None reported at time of study completion    PROCEDURE:  CT of the Abdomen and Pelvis was performed.  Sagittal and coronal reformats were performed.    FINDINGS:  LOWER CHEST: Within normal limits.    LIVER: Within normal limits.  BILE DUCTS: Normal caliber.  GALLBLADDER: Not visualized, possibly surgically absent.  SPLEEN: Within normal limits.  PANCREAS: Within normal limits.  ADRENALS: Right adrenal adenoma, measuring 2.6 cm. Left adrenal   thickening.  KIDNEYS/URETERS: Mild right hydroureteronephrosis to the level of the   distal ureter without obstructing calculus.    BLADDER: Within normal limits.  REPRODUCTIVE ORGANS: Calcified and noncalcified uterine leiomyomas.    BOWEL: No bowel obstruction. Appendix is normal.  PERITONEUM: No ascites.  VESSELS: Atherosclerotic changes.  RETROPERITONEUM/LYMPH NODES: No lymphadenopathy.  ABDOMINAL WALL: Small fat-containing umbilical hernia. Small bilateral   fat-containing inguinal hernias.  BONES: Degenerative changes, greatest in the left hip.    IMPRESSION:  Mild right hydroureteronephrosis to the level of the distal ureter   without obstructing calculus, possibly due to passed stone.    --- End of Report ---        MILLER MORALES MD; Attending Radiologist  This document has been electronically signed. 2022  4:37PM     "75 y/o F with PMHx of CVA (Rt sided deficits), Arthritis, presents to the ED for weakness s/p fall around 0430 this morning. As per daughter at bedside, pt fell down when getting out of the bathroom and fell on her back. Pt usually uses a walker to stand and ambulate, daughter reports pt was unable to steady herself when she sustained the fall. Endorses changes in speaking, tremors, decreased PO intake intermittent LOC but denies head-strike, fever/chills, SOB, CP, abd pain or N/V/D. PMD: Dr. Puri [phone]. Pt is fully vaccinated against COVID and boosted. Patient denies EtOH/tobacco/illicit substance use"    In ED pt wih temp 103.8F and GISEL (Cr2.47)  Urology consulted for CT findings of Right hydroureteronephrosis. PT and family deny any hx of nephrolithiasis or recurrent UTI.  PT denies any recent flank pain, nausea or vomiting.  Does admit to sensation of incomplete bladder empyting. Denies any gross hematuria, or dysuria    PAST MEDICAL & SURGICAL HISTORY:  CVA (cerebrovascular accident)      Arthritis      HTN (hypertension)        FAMILY HISTORY:    SOCIAL HISTORY:   Tobacco hx:   MEDICATIONS  (STANDING):  potassium chloride  10 mEq/100 mL IVPB 10 milliEquivalent(s) IV Intermittent every 1 hour    MEDICATIONS  (PRN):    Allergies    No Known Allergies    Intolerances        REVIEW OF SYSTEMS: Pertinent positives and negatives as stated in HPI, otherwise negative    Vital signs  T(C): 37.9 (22 @ 17:28), Max: 39.9 (22 @ 14:10)  HR: 94 (22 @ 17:28)  BP: 120/69 (22 @ 17:28)  SpO2: 95% (22 @ 17:28)  Wt(kg): --    Physical Exam  Gen: NAD  Abd: Soft, NT, ND, no rebound tenderness or guarding  : +Bartholin cyst  Back: No CVAT       LABS:     @ 15:07    WBC 21.18 / Hct 31.7  / SCr 2.47         132<L>  |  97  |  42<H>  ----------------------------<  192<H>  3.2<L>   |  24  |  2.47<H>    Ca    8.9      2022 15:07  Mg     2.3     -    TPro  7.7  /  Alb  2.5<L>  /  TBili  1.1  /  DBili  x   /  AST  49<H>  /  ALT  42  /  AlkPhos  149<H>  06-    PT/INR - ( 2022 15:07 )   PT: 14.2 sec;   INR: 1.19 ratio         PTT - ( 2022 15:07 )  PTT:26.2 sec  Urinalysis Basic - ( 2022 14:43 )    Color: Yellow / Appearance: very cloudy / S.010 / pH: x  Gluc: x / Ketone: Negative  / Bili: Negative / Urobili: 1 mg/dL   Blood: x / Protein: 100 mg/dL / Nitrite: Negative   Leuk Esterase: Moderate / RBC: 6-10 /HPF / WBC >50   Sq Epi: x / Non Sq Epi: Few / Bacteria: Many        Urine Cx: in progress  Blood Cx: in progress    Radiology:   ACC: [id number] EXAM:  CT ABDOMEN AND PELVIS                          PROCEDURE DATE:  2022          INTERPRETATION:  CLINICAL INFORMATION: Renal failure.    COMPARISON: None.    CONTRAST/COMPLICATIONS:  IV Contrast: NONE  Oral Contrast: NONE  Complications: None reported at time of study completion    PROCEDURE:  CT of the Abdomen and Pelvis was performed.  Sagittal and coronal reformats were performed.    FINDINGS:  LOWER CHEST: Within normal limits.    LIVER: Within normal limits.  BILE DUCTS: Normal caliber.  GALLBLADDER: Not visualized, possibly surgically absent.  SPLEEN: Within normal limits.  PANCREAS: Within normal limits.  ADRENALS: Right adrenal adenoma, measuring 2.6 cm. Left adrenal   thickening.  KIDNEYS/URETERS: Mild right hydroureteronephrosis to the level of the   distal ureter without obstructing calculus.    BLADDER: Within normal limits.  REPRODUCTIVE ORGANS: Calcified and noncalcified uterine leiomyomas.    BOWEL: No bowel obstruction. Appendix is normal.  PERITONEUM: No ascites.  VESSELS: Atherosclerotic changes.  RETROPERITONEUM/LYMPH NODES: No lymphadenopathy.  ABDOMINAL WALL: Small fat-containing umbilical hernia. Small bilateral   fat-containing inguinal hernias.  BONES: Degenerative changes, greatest in the left hip.    IMPRESSION:  Mild right hydroureteronephrosis to the level of the distal ureter   without obstructing calculus, possibly due to passed stone.    --- End of Report ---        MILLER MORALES MD; Attending Radiologist  This document has been electronically signed. 2022  4:37PM

## 2022-06-07 DIAGNOSIS — R06.2 WHEEZING: ICD-10-CM

## 2022-06-07 LAB
A1C WITH ESTIMATED AVERAGE GLUCOSE RESULT: 6.4 % — HIGH (ref 4–5.6)
ALBUMIN SERPL ELPH-MCNC: 2.2 G/DL — LOW (ref 3.3–5)
ALP SERPL-CCNC: 214 U/L — HIGH (ref 40–120)
ALT FLD-CCNC: 46 U/L — SIGNIFICANT CHANGE UP (ref 12–78)
ANION GAP SERPL CALC-SCNC: 10 MMOL/L — SIGNIFICANT CHANGE UP (ref 5–17)
AST SERPL-CCNC: 64 U/L — HIGH (ref 15–37)
BILIRUB SERPL-MCNC: 1.4 MG/DL — HIGH (ref 0.2–1.2)
BUN SERPL-MCNC: 43 MG/DL — HIGH (ref 7–23)
CALCIUM SERPL-MCNC: 9.4 MG/DL — SIGNIFICANT CHANGE UP (ref 8.5–10.1)
CHLORIDE SERPL-SCNC: 102 MMOL/L — SIGNIFICANT CHANGE UP (ref 96–108)
CO2 SERPL-SCNC: 22 MMOL/L — SIGNIFICANT CHANGE UP (ref 22–31)
CREAT SERPL-MCNC: 2.28 MG/DL — HIGH (ref 0.5–1.3)
E COLI DNA BLD POS QL NAA+NON-PROBE: SIGNIFICANT CHANGE UP
EGFR: 22 ML/MIN/1.73M2 — LOW
ESTIMATED AVERAGE GLUCOSE: 137 MG/DL — HIGH (ref 68–114)
GLUCOSE SERPL-MCNC: 156 MG/DL — HIGH (ref 70–99)
GRAM STN FLD: SIGNIFICANT CHANGE UP
GRAM STN FLD: SIGNIFICANT CHANGE UP
HCT VFR BLD CALC: 28.6 % — LOW (ref 34.5–45)
HGB BLD-MCNC: 10 G/DL — LOW (ref 11.5–15.5)
LACTATE SERPL-SCNC: 1.3 MMOL/L — SIGNIFICANT CHANGE UP (ref 0.7–2)
LACTATE SERPL-SCNC: 2.3 MMOL/L — HIGH (ref 0.7–2)
MCHC RBC-ENTMCNC: 27.3 PG — SIGNIFICANT CHANGE UP (ref 27–34)
MCHC RBC-ENTMCNC: 35 G/DL — SIGNIFICANT CHANGE UP (ref 32–36)
MCV RBC AUTO: 78.1 FL — LOW (ref 80–100)
METHOD TYPE: SIGNIFICANT CHANGE UP
NRBC # BLD: 0 /100 WBCS — SIGNIFICANT CHANGE UP (ref 0–0)
PLATELET # BLD AUTO: 204 K/UL — SIGNIFICANT CHANGE UP (ref 150–400)
POTASSIUM SERPL-MCNC: 3.1 MMOL/L — LOW (ref 3.5–5.3)
POTASSIUM SERPL-SCNC: 3.1 MMOL/L — LOW (ref 3.5–5.3)
PROT SERPL-MCNC: 6.8 GM/DL — SIGNIFICANT CHANGE UP (ref 6–8.3)
RBC # BLD: 3.66 M/UL — LOW (ref 3.8–5.2)
RBC # FLD: 15.9 % — HIGH (ref 10.3–14.5)
SODIUM SERPL-SCNC: 134 MMOL/L — LOW (ref 135–145)
SPECIMEN SOURCE: SIGNIFICANT CHANGE UP
SPECIMEN SOURCE: SIGNIFICANT CHANGE UP
WBC # BLD: 22.89 K/UL — HIGH (ref 3.8–10.5)
WBC # FLD AUTO: 22.89 K/UL — HIGH (ref 3.8–10.5)

## 2022-06-07 PROCEDURE — 99233 SBSQ HOSP IP/OBS HIGH 50: CPT

## 2022-06-07 RX ORDER — ALBUTEROL 90 UG/1
1 AEROSOL, METERED ORAL EVERY 4 HOURS
Refills: 0 | Status: COMPLETED | OUTPATIENT
Start: 2022-06-07 | End: 2023-05-06

## 2022-06-07 RX ORDER — POTASSIUM CHLORIDE 20 MEQ
40 PACKET (EA) ORAL ONCE
Refills: 0 | Status: COMPLETED | OUTPATIENT
Start: 2022-06-07 | End: 2022-06-07

## 2022-06-07 RX ORDER — ALBUTEROL 90 UG/1
2.5 AEROSOL, METERED ORAL EVERY 6 HOURS
Refills: 0 | Status: DISCONTINUED | OUTPATIENT
Start: 2022-06-07 | End: 2022-06-08

## 2022-06-07 RX ADMIN — ATORVASTATIN CALCIUM 40 MILLIGRAM(S): 80 TABLET, FILM COATED ORAL at 21:10

## 2022-06-07 RX ADMIN — HEPARIN SODIUM 5000 UNIT(S): 5000 INJECTION INTRAVENOUS; SUBCUTANEOUS at 17:18

## 2022-06-07 RX ADMIN — Medication 40 MILLIEQUIVALENT(S): at 11:11

## 2022-06-07 RX ADMIN — HEPARIN SODIUM 5000 UNIT(S): 5000 INJECTION INTRAVENOUS; SUBCUTANEOUS at 05:32

## 2022-06-07 RX ADMIN — CEFTRIAXONE 100 MILLIGRAM(S): 500 INJECTION, POWDER, FOR SOLUTION INTRAMUSCULAR; INTRAVENOUS at 17:18

## 2022-06-07 RX ADMIN — ALBUTEROL 2.5 MILLIGRAM(S): 90 AEROSOL, METERED ORAL at 18:44

## 2022-06-07 NOTE — PROGRESS NOTE ADULT - SUBJECTIVE AND OBJECTIVE BOX
Patient seen and examined bedside resting comfortably.  No complaints offered.   Voiding spontaneously without difficulty.      T(F): 98 (06-07-22 @ 11:14), Max: 103.8 (06-06-22 @ 14:10)  HR: 81 (06-07-22 @ 11:14) (81 - 110)  BP: 100/66 (06-07-22 @ 11:14) (100/65 - 126/78)  RR: 17 (06-07-22 @ 11:14) (17 - 24)  SpO2: 98% (06-07-22 @ 11:14) (95% - 99%)      ROS:  Negative unless otherwise stated      PHYSICAL EXAM:    General: NAD, alert and awake  HEENT: NCAT, EOMI, conjunctiva clear  Chest: nonlabored respirations, CTA b/l.  Abdomen: soft, NT/ND.   Extremities: Calf soft, nontender b/l.   : No suprapubic tenderness or bladder distention.  Voiding spontaneously PVR ~130cc    LABS:                        10.0   22.89 )-----------( 204      ( 07 Jun 2022 00:58 )             28.6   06-07    134<L>  |  102  |  43<H>  ----------------------------<  156<H>  3.1<L>   |  22  |  2.28<H>    Ca    9.4      07 Jun 2022 00:58  Mg     2.3     06-06    TPro  6.8  /  Alb  2.2<L>  /  TBili  1.4<H>  /  DBili  x   /  AST  64<H>  /  ALT  46  /  AlkPhos  214<H>  06-07  PT/INR - ( 06 Jun 2022 15:07 )   PT: 14.2 sec;   INR: 1.19 ratio         PTT - ( 06 Jun 2022 15:07 )  PTT:26.2 sec  I&O's Detail    06 Jun 2022 07:01  -  07 Jun 2022 07:00  --------------------------------------------------------  IN:    IV PiggyBack: 100 mL    Lactated Ringers: 700 mL  Total IN: 800 mL    OUT:    Voided (mL): 600 mL  Total OUT: 600 mL    Total NET: 200 mL

## 2022-06-07 NOTE — PROGRESS NOTE ADULT - ASSESSMENT
87 y/o female admitted with likely urospesis/pyleonephritis found to have GISEL and right hydroureteronephrois    -f/u Cxs  -abx  -trend Cr  -place ridley catheter  -will follow along   85 y/o female admitted with likely urospesis/pyleonephritis found to have GISEL and right hydroureteronephrois    -f/u Cxs  -abx  -trend Cr  -place ridley catheter- order placed   -Repeat renal US tomorrow   -will follow along

## 2022-06-07 NOTE — PATIENT PROFILE ADULT - FALL HARM RISK - RISK INTERVENTIONS
Assistance OOB with selected safe patient handling equipment/Assistance with ambulation/Communicate Fall Risk and Risk Factors to all staff, patient, and family/Discuss with provider need for PT consult/Monitor gait and stability/Reinforce activity limits and safety measures with patient and family/Sit up slowly, dangle for a short time, stand at bedside before walking/Visual Cue: Yellow wristband/Bed in lowest position, wheels locked, appropriate side rails in place/Call bell, personal items and telephone in reach/Instruct patient to call for assistance before getting out of bed or chair/Non-slip footwear when patient is out of bed/[place] to call system/Physically safe environment - no spills, clutter or unnecessary equipment/Purposeful Proactive Rounding/Room/bathroom lighting operational, light cord in reach

## 2022-06-07 NOTE — PATIENT PROFILE ADULT - NSPROMEDSADMININFO_GEN_A_NUR
E04/04  Patient : Silva Werner Age: 57 year old Sex: female   MRN: [id number] Encounter Date: 9/11/2017      History     Chief Complaint   Patient presents with   • Breathing Problem     HPI   9/11/2017  11:46 AM Silva Werner is a 57 year old female h/o COPD, alcoholic liver cirrhosis, who presents to the ED via private vehicle c/o abdominal distension that started over the last few days. Pt states she is having some swelling to the legs as well and has a dry cough last night, but is not having any significant pain at this time. Pt has needed paracentesis before, but cannot recall when she last received this. Pt wears 3L home O2. Pt denies fever, chills,congestion, abdominal pain, nausea, vomiting, diarrhea, constipation, or any other associated sx. Pt is a smoker, denies alcohol use. No other complaints or modifying factors were reported.    PCP: Jr Vo MD      No Known Allergies    Discharge Medication List as of 9/11/2017  2:41 PM      CONTINUE these medications which have NOT CHANGED    Details   traMADol (ULTRAM) 50 MG tablet Take 1 tablet by mouth every 6 hours as needed for Pain.Normal, Disp-30 tablet, R-0      furosemide (LASIX) 40 MG tablet Take 1 tablet by mouth daily.Historical Med, Disp-30 tablet, R-11      spironolactone (ALDACTONE) 25 MG tablet Take 1 tablet by mouth daily.Eprescribe, Disp-30 tablet, R-3      pantoprazole (PROTONIX) 40 MG tablet TAKE ONE TABLET BY MOUTH AT BEDTIMEEprescribe, Disp-30 tablet, R-5      rifAXIMin (XIFAXAN) 550 MG Tab Take 1 tablet by mouth every 12 hours.Eprescribe, Disp-60 tablet, R-11      lactulose (CHRONULAC) 10 GM/15ML solution Take 15 g by mouth 2 times daily. Historical Med      lidocaine (XYLOCAINE) 2 % jelly Apply topically daily.Historical Med      albuterol 108 (90 BASE) MCG/ACT inhaler Inhale 2 puffs into the lungs every 4 hours as needed.Historical Med      aspirin 81 MG tablet Take 81 mg by mouth daily.Historical Med       fluticasone-salmeterol (ADVAIR) 250-50 MCG/DOSE AEPB Inhale 1 puff into the lungs two times daily.Historical Med      tiotropium (SPIRIVA) 18 MCG inhalation capsule Place 18 mcg into inhaler and inhale daily.Historical Med             Past Medical History:   Diagnosis Date   • Alcohol abuse     Quit June 2016   • Alcoholic liver disease (CMS/HCC)    • COPD (chronic obstructive pulmonary disease) (CMS/HCC) 2/22/2016   • Depression    • Essential (primary) hypertension    • H/O heart artery stent        Past Surgical History:   Procedure Laterality Date   • CHOLECYSTECTOMY         Family History   Problem Relation Age of Onset   • Seizures Father    • Heart disease Paternal Grandmother        Social History   Substance Use Topics   • Smoking status: Current Every Day Smoker     Packs/day: 0.25     Types: Cigarettes   • Smokeless tobacco: Never Used   • Alcohol use No      Comment: pt states alcohol free for 1 year       Review of Systems   Constitutional: Negative for chills and fever.   HENT: Negative for congestion.    Respiratory: Positive for cough (Dry). Negative for shortness of breath.    Cardiovascular: Positive for leg swelling. Negative for chest pain.   Gastrointestinal: Positive for abdominal distention. Negative for abdominal pain, diarrhea and vomiting.   Genitourinary: Negative for difficulty urinating and dysuria.   Musculoskeletal: Negative for back pain.   Skin: Negative for rash.   Neurological: Negative for dizziness.   Psychiatric/Behavioral: Negative for behavioral problems.       Physical Exam     ED Triage Vitals [09/11/17 1134]   ED Triage Vitals Group      Temp 97.5 °F (36.4 °C)      Pulse 94      Resp 22      /73      SpO2 100 %      EtCO2 mmHg       Height 5' 6\" (1.676 m)      Weight 160 lb (72.6 kg)      Weight Scale Used ED Stated       Vitals:    09/11/17 1400 09/11/17 1416 09/11/17 1430 09/11/17 1444   BP: 130/61  134/71    Pulse:  78 82 83   Resp:  12 20 19   Temp:        TempSrc:       SpO2:  99% 98% 98%   Weight:       Height:             Physical Exam   Constitutional: She is oriented to person, place, and time. She appears well-developed.   HENT:   Head: Normocephalic and atraumatic.   Eyes: Pupils are equal, round, and reactive to light.   Neck: Normal range of motion.   Cardiovascular: Normal rate.    Feet warm and perfused.    Pulmonary/Chest: Effort normal and breath sounds normal.   Abdominal: Bowel sounds are normal. She exhibits distension and ascites. There is no tenderness.   Musculoskeletal: Normal range of motion. She exhibits edema (1-2+ pretibial). She exhibits no tenderness.   Neurological: She is alert and oriented to person, place, and time.   Skin: Skin is warm.   Psychiatric: She has a normal mood and affect.   Nursing note and vitals reviewed.      ED Course     Procedures    Lab Results     Results for orders placed or performed during the hospital encounter of 09/11/17   Urinalysis & Reflex Micro with Culture if Indicated   Result Value Ref Range    COLOR JEAN (A) YELLOW    APPEARANCE HAZY     GLUCOSE(URINE) NEGATIVE NEGATIVE mg/dL    BILIRUBIN NEGATIVE NEGATIVE    KETONES TRACE (A) NEGATIVE mg/dL    SPECIFIC GRAVITY 1.016 1.005 - 1.030    BLOOD NEGATIVE NEGATIVE    pH 6.0 5.0 - 7.0 Units    PROTEIN(URINE) NEGATIVE NEGATIVE mg/dL    UROBILINOGEN 1.0 0.0 - 1.0 mg/dL    NITRITE NEGATIVE NEGATIVE    LEUKOCYTE ESTERASE NEGATIVE NEGATIVE    SPECIMEN TYPE URINE, CLEAN CATCH/MIDSTREAM    CBC & Auto Differential   Result Value Ref Range    WBC 2.7 (L) 4.2 - 11.0 K/mcL    RBC 3.86 (L) 4.00 - 5.20 mil/mcL    HGB 10.1 (L) 12.0 - 15.5 g/dL    HCT 27.0 (L) 36.0 - 46.5 %    MCV 69.9 (L) 78.0 - 100.0 fl    MCH 26.2 26.0 - 34.0 pg    MCHC 37.4 (H) 32.0 - 36.5 g/dL    RDW-CV 24.6 (H) 11.0 - 15.0 %    PLT 99 (L) 140 - 450 K/mcL    DIFF TYPE AUTO DIFF verified by manual smear review.     Neutrophil 74 %    LYMPH 13 %    MONO 11 %    EOSIN 2 %    BASO 0 %    Absolute  Neutrophil 2.0 1.8 - 7.7 K/mcL    Absolute Lymph 0.4 (L) 1.0 - 4.0 K/mcL    Absolute Mono 0.3 0.3 - 0.9 K/mcL    Absolute Eos 0.1 0.1 - 0.5 K/mcL    Absolute Baso 0.0 0.0 - 0.3 K/mcL    WBC MORPHOLOGY NORMAL NORMAL    PLATELETS APPEAR NORMAL NORMAL    Target Cells MODERATE     Shistocytes FEW    B Type Natriuretic Peptide BNP   Result Value Ref Range    B-TYPE NATRIURETIC PEPTIDE 47 <100 pg/mL   Hepatic Function Panel   Result Value Ref Range    Albumin 2.5 (L) 3.6 - 5.1 g/dL    TOTAL BILIRUBIN 4.2 (H) 0.2 - 1.0 mg/dL    DIRECT BILIRUBIN 2.3 (H) 0.0 - 0.2 mg/dL    ALK PHOSPHATASE 104 45 - 117 Units/L    ALT/SGPT 50 <79 Units/L    AST/SGOT 65 (H) <38 Units/L    TOTAL PROTEIN 6.5 6.4 - 8.2 g/dL   Troponin I - Point of Care   Result Value Ref Range    Troponin I POC <0.10 <0.10 ng/mL   Creatinine - Point of Care   Result Value Ref Range    Creatinine POC 1.10 (H) 0.51 - 0.95 mg/dL    Estimated GFR  (POC) 64     Estimated GFR Non- (POC) 55    ISTAT8 Venous - Point of Care   Result Value Ref Range    Sodium  135 - 145 mmol/L    Potassium POC 3.2 (L) 3.4 - 5.1 mmol/L    Chloride  98 - 107 mmol/L    CO2 Total 22 19 - 24 mmol/L    BUN POC 10 6 - 20 mg/dL    GLUCOSE  (H) 65 - 99 mg/dL    HEMATOCRIT POC 39.0 36.0 - 46.5 %    PH Venous POC 7.37 7.35 - 7.45 Units    PCO2 Venous 35 (L) 38 - 51 mm Hg    HCO3 Venous 21 (L) 22 - 28 mmol/L    Base Deficit Venous 4 (H) 0 - 2 mmol/L    ANION GAP POC 18 mmol/L    Hemoglobin POC 13.3 12.0 - 15.5 g/dL       EKG Results     EKG Interpretation  Rate: 83  Rhythm: normal sinus rhythm   Abnormality:   No significant change from August 30, 2017    EKG interpreted by ED physician    Radiology Results     Imaging Results          XR Chest AP or PA (Final result)  Result time 09/11/17 12:44:42    Final result                 Impression:    IMPRESSION: Mild increase in suspected bibasilar atelectasis. Infiltrate is  less likely but not  completely excluded.               Narrative:    EXAM: XR CHEST AP OR PA    CLINICAL HISTORY: sob    TECHNIQUE: Portable chest.    COMPARISON: August 30, 2017.    FINDINGS: The heart is normal in size. Pulmonary vessels are normal. Mild  horizontal stranding is present in the lung bases, slightly greater on the  left than the right. This has increased mildly since the prior study.  Findings most likely represent atelectasis although infiltrates cannot be  completely excluded. Mid and upper lungs are clear. No effusion.                                ED Medication Orders     Start Ordered     Status Ordering Provider    09/11/17 1336 09/11/17 1336  furosemide (LASIX) injection 40 mg  ONCE      Last MAR action:  Given JEAN PAUL WEISS    09/11/17 1216 09/11/17 1215  sodium chloride (PF) 0.9 % injection 2 mL  (Capped IV)  ONCE      Last MAR action:  Given JEAN PAUL WEISS    09/11/17 1214 09/11/17 1215  sodium chloride (PF) 0.9 % injection 2 mL  (Capped IV)  PRN      Acknowledged JEAN PAUL WEISS          ED Course  2:33 PM Rechecked pt. She is awake and resting comfortably, eating sandwich. Updated pt on results of ED workup thus far. Discussed with pt that she will likely be able to return home today and informed pt that her hepatologist will be consulted to confirm plan of care. Pt is agreeable.     2:40 PM - Spoke with Mckenna, on-call hepatology PA for Dr. Dawkins regarding the patient's presentation and the ED work up. We agreed on plan of care. Their office will contact pt regarding further outpatient care and moving pt's appointment up sooner.     2:44 PM Updated pt on conversation with Mckenna and reviewed recommendations. Informed pt that their office will contact her regarding outpatient paracentesis and follow up. Instructed pt to take her Furosemide as prescribed tonight. Stressed the importance of f/u or return to ED if sx change or worsen. Pt understands and agrees with plan. All questions addressed.      MDM   Pt  presented to the ED with abdominal distension.  She has a history of alcohol liver disease and ascites with recent paracentesis.  Pt feels her abdomen has become more distended.  On PEx, no abdominal tenderness or evidence of SBP.  Workup unremarkable and patient had normal mental status.  No indication for admission and outpatient paracentesis will be coordinated per Dr. Dawkins's office.  Pt tolerating PO and was discharged in good condition.    Critical Care time spent on this patient outside of billable procedures:  None    Clinical Impression  ED Diagnoses        Final diagnoses    ALD (alcoholic liver disease) (CMS/HCC)     Ascites due to alcoholic cirrhosis (CMS/HCC)     Peripheral edema             Follow-up  Leslie Dawkins MD  [address] [facility]  [address] [facility] [address] [phone]      Dr. Dawkins's office will contact you.  Please continue to take your medications as prescribed.       Discharge Medication List as of 9/11/2017  2:41 PM          Pt is discharged in stable condition.    ______________________________________________________________________        I have reviewed the information recorded by the scribe for accuracy and agree with its contents.    ____________________________________________________________________  Sunil Jeffrey acting as scribe for Sabino Rodas MD.    Sabino Rodas MD  Dictation # [id number]  Scribe: Sunil Rodas MD  09/11/17 2543     no concerns

## 2022-06-07 NOTE — PHYSICAL THERAPY INITIAL EVALUATION ADULT - GENERAL OBSERVATIONS, REHAB EVAL
Chart (EMR) reviewed. Received supine c HOB elevated, NAD. +heplock, +ridley cath.  present. Alert. Ox4. Able to follow multistep commands/directions.

## 2022-06-07 NOTE — PROGRESS NOTE ADULT - SUBJECTIVE AND OBJECTIVE BOX
Patient is a 76y old  Female who presents with a chief complaint of UTI, AMS (2022 11:19)      INTERVAL HPI/OVERNIGHT EVENTS:    MEDICATIONS  (STANDING):  amLODIPine   Tablet 10 milliGRAM(s) Oral daily  atorvastatin 40 milliGRAM(s) Oral at bedtime  cefTRIAXone   IVPB 1000 milliGRAM(s) IV Intermittent every 24 hours  heparin   Injectable 5000 Unit(s) SubCutaneous every 12 hours  lactated ringers. 1000 milliLiter(s) (100 mL/Hr) IV Continuous <Continuous>    MEDICATIONS  (PRN):      Allergies    No Known Allergies    Intolerances        REVIEW OF SYSTEMS:  CONSTITUTIONAL: No fever, weight loss  EYES: No eye pain, visual disturbances, or discharge  ENMT:  No difficulty hearing, tinnitus, vertigo; No sinus or throat pain  RESPIRATORY: No cough, wheezing, chills or hemoptysis; No shortness of breath  CARDIOVASCULAR: No chest pain, palpitations, dizziness, or leg swelling  GASTROINTESTINAL: No abdominal or epigastric pain. No nausea, vomiting, or hematemesis; No diarrhea or constipation. No melena or hematochezia.  GENITOURINARY: No dysuria, frequency, hematuria, or incontinence  NEUROLOGICAL: No headaches, memory loss, loss of strength, numbness, or tremors  SKIN: No itching, burning, rashes, or lesions   MUSCULOSKELETAL: No joint pain or swelling; No muscle, back, or extremity pain  PSYCHIATRIC: No depression, anxiety, mood swings, or difficulty sleeping  HEME/LYMPH: No easy bruising, or bleeding gums      Vital Signs Last 24 Hrs  T(C): 36.7 (2022 11:14), Max: 39.9 (2022 14:10)  T(F): 98 (2022 11:14), Max: 103.8 (2022 14:10)  HR: 81 (2022 11:14) (81 - 110)  BP: 100/66 (2022 11:14) (100/65 - 126/78)  BP(mean): --  RR: 17 (2022 11:14) (17 - 24)  SpO2: 98% (2022 11:14) (95% - 99%)    PHYSICAL EXAM:  GENERAL: NAD  HEAD:  Atraumatic, Normocephalic  EYES: EOMI, PERRLA, conjunctiva and sclera clear  ENMT: No tonsillar erythema, exudates, or enlargement;   NECK: Supple, Normal thyroid  NERVOUS SYSTEM:  Alert & Oriented X1, Motor Strength 4/5 left lower extremities; DTRs 2+ intact and symmetric  CHEST/LUNG: CTABL; No rales, rhonchi, wheezing, or rubs  HEART: Regular rate and rhythm; No murmurs, rubs, or gallops  ABDOMEN: Soft, Nontender, Nondistended; Bowel sounds present  EXTREMITIES:  2+ Peripheral Pulses, No clubbing, cyanosis, or edema  LYMPH: No lymphadenopathy noted  SKIN: No rashes or lesions    LABS:                        10.0   22.89 )-----------( 204      ( 2022 00:58 )             28.6     06-    134<L>  |  102  |  43<H>  ----------------------------<  156<H>  3.1<L>   |  22  |  2.28<H>    Ca    9.4      2022 00:58  Mg     2.3     06-06    TPro  6.8  /  Alb  2.2<L>  /  TBili  1.4<H>  /  DBili  x   /  AST  64<H>  /  ALT  46  /  AlkPhos  214<H>  06-07    PT/INR - ( 2022 15:07 )   PT: 14.2 sec;   INR: 1.19 ratio         PTT - ( 2022 15:07 )  PTT:26.2 sec  Urinalysis Basic - ( 2022 14:43 )    Color: Yellow / Appearance: very cloudy / S.010 / pH: x  Gluc: x / Ketone: Negative  / Bili: Negative / Urobili: 1 mg/dL   Blood: x / Protein: 100 mg/dL / Nitrite: Negative   Leuk Esterase: Moderate / RBC: 6-10 /HPF / WBC >50   Sq Epi: x / Non Sq Epi: Few / Bacteria: Many      CAPILLARY BLOOD GLUCOSE          RADIOLOGY & ADDITIONAL TESTS:    Imaging Personally Reviewed:  [ ] YES  [ ] NO    Consultant(s) Notes Reviewed:  [ ] YES  [ ] NO    Care Discussed with Consultants/Other Providers [ ] YES  [ ] NO Patient is a 76y old  Female who presents with a chief complaint of UTI, AMS (2022 11:19)      INTERVAL HPI/OVERNIGHT EVENTS:    MEDICATIONS  (STANDING):  amLODIPine   Tablet 10 milliGRAM(s) Oral daily  atorvastatin 40 milliGRAM(s) Oral at bedtime  cefTRIAXone   IVPB 1000 milliGRAM(s) IV Intermittent every 24 hours  heparin   Injectable 5000 Unit(s) SubCutaneous every 12 hours  lactated ringers. 1000 milliLiter(s) (100 mL/Hr) IV Continuous <Continuous>    MEDICATIONS  (PRN):      Allergies    No Known Allergies    Intolerances        REVIEW OF SYSTEMS:  CONSTITUTIONAL: No fever, weight loss  EYES: No eye pain, visual disturbances, or discharge  ENMT:  No difficulty hearing, tinnitus, vertigo; No sinus or throat pain  RESPIRATORY: No cough, wheezing, chills or hemoptysis; No shortness of breath  CARDIOVASCULAR: No chest pain, palpitations, dizziness, or leg swelling  GASTROINTESTINAL: No abdominal or epigastric pain. No nausea, vomiting, or hematemesis; No diarrhea or constipation. No melena or hematochezia.  GENITOURINARY: No dysuria, frequency, hematuria, or incontinence  NEUROLOGICAL: No headaches, memory loss, loss of strength, numbness, or tremors  SKIN: No itching, burning, rashes, or lesions   MUSCULOSKELETAL: No joint pain or swelling; No muscle, back, or extremity pain  PSYCHIATRIC: No depression, anxiety, mood swings, or difficulty sleeping  HEME/LYMPH: No easy bruising, or bleeding gums      Vital Signs Last 24 Hrs  T(C): 36.7 (2022 11:14), Max: 39.9 (2022 14:10)  T(F): 98 (2022 11:14), Max: 103.8 (2022 14:10)  HR: 81 (2022 11:14) (81 - 110)  BP: 100/66 (2022 11:14) (100/65 - 126/78)  BP(mean): --  RR: 17 (2022 11:14) (17 - 24)  SpO2: 98% (2022 11:14) (95% - 99%)    PHYSICAL EXAM:  GENERAL: NAD  HEAD:  Atraumatic, Normocephalic  EYES: EOMI, PERRLA, conjunctiva and sclera clear  ENMT: No tonsillar erythema, exudates, or enlargement;   NECK: Supple, Normal thyroid  NERVOUS SYSTEM:  Alert & Oriented X1, Motor Strength 4/5 left lower extremities; DTRs 2+ intact and symmetric  CHEST/LUNG: mild scattered wheeze. no distress   HEART: Regular rate and rhythm; No murmurs, rubs, or gallops  ABDOMEN: Soft, Nontender, Nondistended; Bowel sounds present  EXTREMITIES:  2+ Peripheral Pulses, No clubbing, cyanosis, or edema  LYMPH: No lymphadenopathy noted  SKIN: No rashes or lesions    LABS:                        10.0   22.89 )-----------( 204      ( 2022 00:58 )             28.6     -    134<L>  |  102  |  43<H>  ----------------------------<  156<H>  3.1<L>   |  22  |  2.28<H>    Ca    9.4      2022 00:58  Mg     2.3     -    TPro  6.8  /  Alb  2.2<L>  /  TBili  1.4<H>  /  DBili  x   /  AST  64<H>  /  ALT  46  /  AlkPhos  214<H>  06-    PT/INR - ( 2022 15:07 )   PT: 14.2 sec;   INR: 1.19 ratio         PTT - ( 2022 15:07 )  PTT:26.2 sec  Urinalysis Basic - ( 2022 14:43 )    Color: Yellow / Appearance: very cloudy / S.010 / pH: x  Gluc: x / Ketone: Negative  / Bili: Negative / Urobili: 1 mg/dL   Blood: x / Protein: 100 mg/dL / Nitrite: Negative   Leuk Esterase: Moderate / RBC: 6-10 /HPF / WBC >50   Sq Epi: x / Non Sq Epi: Few / Bacteria: Many      CAPILLARY BLOOD GLUCOSE          RADIOLOGY & ADDITIONAL TESTS:    Imaging Personally Reviewed:  [ ] YES  [ ] NO    Consultant(s) Notes Reviewed:  [ ] YES  [ ] NO    Care Discussed with Consultants/Other Providers [ ] YES  [ ] NO

## 2022-06-07 NOTE — PROVIDER CONTACT NOTE (CRITICAL VALUE NOTIFICATION) - BACKGROUND
Patient admitted with  Pt will be free of increased temperature and free of others signs of infection, other specified sepsis.

## 2022-06-07 NOTE — PROGRESS NOTE ADULT - ASSESSMENT
Patient is a 76F with a PMH of CVA with R sided deficits, OA, HTN, HLD who presents to the ED for AMS.  Patient states that she was returning from the bathroom this morning when she suddenly felt dizzy and fell onto her back.  Patient found on the floor by family, required assistance to get back on her feet.  Family noted increased confusion after the fall and called EMS.  Patient currently has no active complaints.  Family at the bedside states she is currently at her baseline mental status.  Patient states she has had some dysuria for about a week but denies history of fever, chills, nausea, vomiting, chest pain, palpitations, cough, or dyspnea.  NKDA.  Febrile to 103.8, labs show leukocytosis and elevated creatinine.  Will admit to med surg.    sepsis POA secondary to ecoli uti and bacteremia

## 2022-06-07 NOTE — PHYSICAL THERAPY INITIAL EVALUATION ADULT - PATIENT/FAMILY AGREES WITH PLAN
Subacute rehab/yes Detail Level: Zone Photo Preface (Leave Blank If You Do Not Want): Photographs were obtained today

## 2022-06-07 NOTE — PHYSICAL THERAPY INITIAL EVALUATION ADULT - LEVEL OF INDEPENDENCE: SCOOT/BRIDGE, REHAB EVAL
Patient present to MAC with reports of decreased fetal movement.    Patient has been feeling some movement, but not as much as usual. Verbal permission given to apply external monitors. History and VS taken. Patient felt movement while in MAC, also heard on U/S. Denies any leaking or bleeding.  
moderate assist (50% patients effort)

## 2022-06-07 NOTE — PHYSICAL THERAPY INITIAL EVALUATION ADULT - ADDITIONAL COMMENTS
Patient lives c  in a pvt house c 4 entry steps (no rail), has back entrance c 2 steps c L rail up, all amenities on the 1st floor. Independent c all ADL's and household ambulation with rolling walker.

## 2022-06-08 DIAGNOSIS — A41.51 SEPSIS DUE TO ESCHERICHIA COLI [E. COLI]: ICD-10-CM

## 2022-06-08 DIAGNOSIS — D50.9 IRON DEFICIENCY ANEMIA, UNSPECIFIED: ICD-10-CM

## 2022-06-08 LAB
-  AMIKACIN: SIGNIFICANT CHANGE UP
-  AMOXICILLIN/CLAVULANIC ACID: SIGNIFICANT CHANGE UP
-  AMPICILLIN/SULBACTAM: SIGNIFICANT CHANGE UP
-  AMPICILLIN: SIGNIFICANT CHANGE UP
-  AZTREONAM: SIGNIFICANT CHANGE UP
-  CEFAZOLIN: SIGNIFICANT CHANGE UP
-  CEFEPIME: SIGNIFICANT CHANGE UP
-  CEFOXITIN: SIGNIFICANT CHANGE UP
-  CEFTRIAXONE: SIGNIFICANT CHANGE UP
-  CIPROFLOXACIN: SIGNIFICANT CHANGE UP
-  ERTAPENEM: SIGNIFICANT CHANGE UP
-  GENTAMICIN: SIGNIFICANT CHANGE UP
-  IMIPENEM: SIGNIFICANT CHANGE UP
-  LEVOFLOXACIN: SIGNIFICANT CHANGE UP
-  MEROPENEM: SIGNIFICANT CHANGE UP
-  NITROFURANTOIN: SIGNIFICANT CHANGE UP
-  PIPERACILLIN/TAZOBACTAM: SIGNIFICANT CHANGE UP
-  TIGECYCLINE: SIGNIFICANT CHANGE UP
-  TOBRAMYCIN: SIGNIFICANT CHANGE UP
-  TRIMETHOPRIM/SULFAMETHOXAZOLE: SIGNIFICANT CHANGE UP
ALBUMIN SERPL ELPH-MCNC: 2 G/DL — LOW (ref 3.3–5)
ALP SERPL-CCNC: 154 U/L — HIGH (ref 40–120)
ALT FLD-CCNC: 49 U/L — SIGNIFICANT CHANGE UP (ref 12–78)
ANION GAP SERPL CALC-SCNC: 10 MMOL/L — SIGNIFICANT CHANGE UP (ref 5–17)
AST SERPL-CCNC: 65 U/L — HIGH (ref 15–37)
BILIRUB SERPL-MCNC: 0.8 MG/DL — SIGNIFICANT CHANGE UP (ref 0.2–1.2)
BUN SERPL-MCNC: 36 MG/DL — HIGH (ref 7–23)
CALCIUM SERPL-MCNC: 8.9 MG/DL — SIGNIFICANT CHANGE UP (ref 8.5–10.1)
CHLORIDE SERPL-SCNC: 100 MMOL/L — SIGNIFICANT CHANGE UP (ref 96–108)
CO2 SERPL-SCNC: 24 MMOL/L — SIGNIFICANT CHANGE UP (ref 22–31)
CREAT SERPL-MCNC: 1.89 MG/DL — HIGH (ref 0.5–1.3)
CULTURE RESULTS: SIGNIFICANT CHANGE UP
EGFR: 27 ML/MIN/1.73M2 — LOW
GLUCOSE SERPL-MCNC: 177 MG/DL — HIGH (ref 70–99)
HCT VFR BLD CALC: 27.5 % — LOW (ref 34.5–45)
HGB BLD-MCNC: 9.4 G/DL — LOW (ref 11.5–15.5)
MCHC RBC-ENTMCNC: 26.6 PG — LOW (ref 27–34)
MCHC RBC-ENTMCNC: 34.2 G/DL — SIGNIFICANT CHANGE UP (ref 32–36)
MCV RBC AUTO: 77.9 FL — LOW (ref 80–100)
METHOD TYPE: SIGNIFICANT CHANGE UP
NRBC # BLD: 0 /100 WBCS — SIGNIFICANT CHANGE UP (ref 0–0)
ORGANISM # SPEC MICROSCOPIC CNT: SIGNIFICANT CHANGE UP
ORGANISM # SPEC MICROSCOPIC CNT: SIGNIFICANT CHANGE UP
PLATELET # BLD AUTO: 221 K/UL — SIGNIFICANT CHANGE UP (ref 150–400)
POTASSIUM SERPL-MCNC: 3.2 MMOL/L — LOW (ref 3.5–5.3)
POTASSIUM SERPL-SCNC: 3.2 MMOL/L — LOW (ref 3.5–5.3)
PROT SERPL-MCNC: 6.6 GM/DL — SIGNIFICANT CHANGE UP (ref 6–8.3)
RBC # BLD: 3.53 M/UL — LOW (ref 3.8–5.2)
RBC # FLD: 16.2 % — HIGH (ref 10.3–14.5)
SODIUM SERPL-SCNC: 134 MMOL/L — LOW (ref 135–145)
SPECIMEN SOURCE: SIGNIFICANT CHANGE UP
WBC # BLD: 16.52 K/UL — HIGH (ref 3.8–10.5)
WBC # FLD AUTO: 16.52 K/UL — HIGH (ref 3.8–10.5)

## 2022-06-08 PROCEDURE — 76775 US EXAM ABDO BACK WALL LIM: CPT | Mod: 26

## 2022-06-08 PROCEDURE — 99233 SBSQ HOSP IP/OBS HIGH 50: CPT

## 2022-06-08 PROCEDURE — 99223 1ST HOSP IP/OBS HIGH 75: CPT

## 2022-06-08 RX ORDER — POTASSIUM CHLORIDE 20 MEQ
40 PACKET (EA) ORAL ONCE
Refills: 0 | Status: COMPLETED | OUTPATIENT
Start: 2022-06-08 | End: 2022-06-08

## 2022-06-08 RX ORDER — POLYETHYLENE GLYCOL 3350 17 G/17G
17 POWDER, FOR SOLUTION ORAL DAILY
Refills: 0 | Status: DISCONTINUED | OUTPATIENT
Start: 2022-06-08 | End: 2022-06-14

## 2022-06-08 RX ORDER — CEFTRIAXONE 500 MG/1
2000 INJECTION, POWDER, FOR SOLUTION INTRAMUSCULAR; INTRAVENOUS EVERY 24 HOURS
Refills: 0 | Status: DISCONTINUED | OUTPATIENT
Start: 2022-06-08 | End: 2022-06-14

## 2022-06-08 RX ORDER — ALBUTEROL 90 UG/1
1 AEROSOL, METERED ORAL EVERY 4 HOURS
Refills: 0 | Status: DISCONTINUED | OUTPATIENT
Start: 2022-06-08 | End: 2022-06-14

## 2022-06-08 RX ADMIN — CEFTRIAXONE 100 MILLIGRAM(S): 500 INJECTION, POWDER, FOR SOLUTION INTRAMUSCULAR; INTRAVENOUS at 18:33

## 2022-06-08 RX ADMIN — Medication 40 MILLIEQUIVALENT(S): at 12:37

## 2022-06-08 RX ADMIN — ALBUTEROL 1 PUFF(S): 90 AEROSOL, METERED ORAL at 22:39

## 2022-06-08 RX ADMIN — ATORVASTATIN CALCIUM 40 MILLIGRAM(S): 80 TABLET, FILM COATED ORAL at 21:54

## 2022-06-08 RX ADMIN — ALBUTEROL 2.5 MILLIGRAM(S): 90 AEROSOL, METERED ORAL at 00:27

## 2022-06-08 RX ADMIN — AMLODIPINE BESYLATE 10 MILLIGRAM(S): 2.5 TABLET ORAL at 05:34

## 2022-06-08 RX ADMIN — HEPARIN SODIUM 5000 UNIT(S): 5000 INJECTION INTRAVENOUS; SUBCUTANEOUS at 05:34

## 2022-06-08 RX ADMIN — POLYETHYLENE GLYCOL 3350 17 GRAM(S): 17 POWDER, FOR SOLUTION ORAL at 21:54

## 2022-06-08 RX ADMIN — HEPARIN SODIUM 5000 UNIT(S): 5000 INJECTION INTRAVENOUS; SUBCUTANEOUS at 18:33

## 2022-06-08 NOTE — PROGRESS NOTE ADULT - SUBJECTIVE AND OBJECTIVE BOX
Patient seen and examined bedside resting comfortably.  No complaints offered.   Cloudy, yellow urine in indwelling ridley tubing   Denies hematuria and dysuria.     T(F): 99.8 (06-08-22 @ 05:59), Max: 99.8 (06-08-22 @ 05:59)  HR: 95 (06-08-22 @ 05:59) (60 - 95)  BP: 138/80 (06-08-22 @ 05:59) (105/60 - 138/80)  RR: 18 (06-08-22 @ 05:59) (18 - 18)  SpO2: 95% (06-08-22 @ 05:59) (95% - 99%)      ROS:  Negative unless otherwise stated      PHYSICAL EXAM:    General: NAD, alert and awake  HEENT: NCAT, EOMI, conjunctiva clear  Chest: nonlabored respirations, CTA b/l.  Abdomen: soft, NT/ND.   Extremities: Calf soft, nontender b/l.   : No suprapubic tenderness or bladder distention.  Cloudy, yellow urine in indwelling ridley tubing     LABS:                        9.4    16.52 )-----------( 221      ( 08 Jun 2022 07:01 )             27.5   06-08    134<L>  |  100  |  36<H>  ----------------------------<  177<H>  3.2<L>   |  24  |  1.89<H>    Ca    8.9      08 Jun 2022 07:01  Mg     2.3     06-06    TPro  6.6  /  Alb  2.0<L>  /  TBili  0.8  /  DBili  x   /  AST  65<H>  /  ALT  49  /  AlkPhos  154<H>  06-08  PT/INR - ( 06 Jun 2022 15:07 )   PT: 14.2 sec;   INR: 1.19 ratio         PTT - ( 06 Jun 2022 15:07 )  PTT:26.2 sec  I&O's Detail    07 Jun 2022 07:01  -  08 Jun 2022 07:00  --------------------------------------------------------  IN:    IV PiggyBack: 50 mL  Total IN: 50 mL    OUT:    Voided (mL): 1450 mL  Total OUT: 1450 mL    Total NET: -1400 mL      08 Jun 2022 07:01  -  08 Jun 2022 11:14  --------------------------------------------------------  IN:  Total IN: 0 mL    OUT:    Voided (mL): 600 mL  Total OUT: 600 mL    Total NET: -600 mL

## 2022-06-08 NOTE — CONSULT NOTE ADULT - SUBJECTIVE AND OBJECTIVE BOX
SANDHYA JOSÉ  MRN-[id number]        Patient is a 76y old  Female who presents with a chief complaint of UTI, AMS (08 Jun 2022 14:05)      HPI:  Patient is a 76F with a PMH of CVA with R sided deficits, OA, HTN, HLD who presents to the ED for AMS.  Patient states that she was returning from the bathroom this morning when she suddenly felt dizzy and fell onto her back.  Patient found on the floor by family, required assistance to get back on her feet.  Family noted increased confusion after the fall and called EMS.  Patient currently has no active complaints.  Family at the bedside states she is currently at her baseline mental status.  Patient states she has had some dysuria for about a week but denies history of fever, chills, nausea, vomiting, chest pain, palpitations, cough, or dyspnea.  NKDA.  Febrile to 103.8, labs show leukocytosis and elevated creatinine.  Will admit to med surg.   (06 Jun 2022 17:47)      ID consulted for workup and antibiotic management     PAST MEDICAL & SURGICAL HISTORY:  CVA (cerebrovascular accident)      Arthritis      HTN (hypertension)          Allergies  No Known Allergies        ANTIMICROBIALS:  cefTRIAXone   IVPB 2000 every 24 hours      MEDICATIONS  (STANDING):  cefTRIAXone   IVPB   100 mL/Hr IV Intermittent (06-07-22 @ 17:18)    cefTRIAXone   IVPB   100 mL/Hr IV Intermittent (06-06-22 @ 16:48)        OTHER MEDS: MEDICATIONS  (STANDING):  ALBUTerol    90 MICROgram(s) HFA Inhaler 1 every 4 hours  amLODIPine   Tablet 10 daily  atorvastatin 40 at bedtime  heparin   Injectable 5000 every 12 hours      SOCIAL HISTORY:       FAMILY HISTORY:  FH: HTN (hypertension)        REVIEW OF SYSTEMS  [  ] ROS unobtainable because:    [  ] All other systems negative except as noted below:	    Constitutional:  [ ] fever [ ] chills  [ ] weight loss  [ ] weakness  Skin:  [ ] rash [ ] phlebitis	  Eyes: [ ] icterus [ ] pain  [ ] discharge	  ENMT: [ ] sore throat  [ ] thrush [ ] ulcers [ ] exudates  Respiratory: [ ] dyspnea [ ] hemoptysis [ ] cough [ ] sputum	  Cardiovascular:  [ ] chest pain [ ] palpitations [ ] edema	  Gastrointestinal:  [ ] nausea [ ] vomiting [ ] diarrhea [ ] constipation [ ] pain	  Genitourinary:  [ ] dysuria [ ] frequency [ ] hematuria [ ] discharge [ ] flank pain  [ ] incontinence  Musculoskeletal:  [ ] myalgias [ ] arthralgias [ ] arthritis  [ ] back pain  Neurological:  [ ] headache [ ] seizures  [ ] confusion/altered mental status  Psychiatric:  [ ] anxiety [ ] depression	  Hematology/Lymphatics:  [ ] lymphadenopathy  Endocrine:  [ ] adrenal [ ] thyroid  Allergic/Immunologic:	 [ ] transplant [ ] seasonal    Vital Signs Last 24 Hrs  T(F): 98.6 (06-08-22 @ 16:15), Max: 103.8 (06-06-22 @ 14:10)    Vital Signs Last 24 Hrs  HR: 86 (06-08-22 @ 16:15) (60 - 95)  BP: 115/79 (06-08-22 @ 16:15) (112/69 - 138/80)  RR: 18 (06-08-22 @ 16:15)  SpO2: 96% (06-08-22 @ 16:15) (95% - 99%)  Wt(kg): --    PHYSICAL EXAM:  Constitutional: non-toxic, no distress  HEAD/EYES: anicteric, no conjunctival injection  ENT:  supple, no thrush  Cardiovascular:   normal S1, S2, no murmur, no edema  Respiratory:  clear BS bilaterally, no wheezes, no rales  GI:  soft, non-tender, normal bowel sounds  :  no ridley, no CVA tenderness  Musculoskeletal:  no synovitis, normal ROM  Neurologic: awake and alert, normal strength, no focal findings  Skin:  no rash, no erythema, no phlebitis  Heme/Onc: no lymphadenopathy   Psychiatric:  awake, alert, appropriate mood          WBC Count: 16.52 K/uL (06-08 @ 07:01)  WBC Count: 22.89 K/uL (06-07 @ 00:58)  WBC Count: 21.18 K/uL (06-06 @ 15:07)      Auto Neutrophil %: 89.0 % *H* (06-06-22 @ 15:07)  Auto Neutrophil #: 18.87 K/uL *H* (06-06-22 @ 15:07)                            9.4    16.52 )-----------( 221      ( 08 Jun 2022 07:01 )             27.5       06-08    134<L>  |  100  |  36<H>  ----------------------------<  177<H>  3.2<L>   |  24  |  1.89<H>    Ca    8.9      08 Jun 2022 07:01    TPro  6.6  /  Alb  2.0<L>  /  TBili  0.8  /  DBili  x   /  AST  65<H>  /  ALT  49  /  AlkPhos  154<H>  06-08      Creatinine Trend: 1.89<--, 2.28<--, 2.47<--        Lactate, Blood: 1.3 mmol/L (06-07-22 @ 06:04)  Lactate, Blood: 2.3 mmol/L (06-07-22 @ 00:58)  Lactate, Blood: 2.2 mmol/L (06-06-22 @ 18:47)      MICROBIOLOGY:    Culture - Blood (collected 06-06-22 @ 18:49)  Source: .Blood Blood-Peripheral  Gram Stain (prelim) (06-07-22 @ 06:45):    Growth in aerobic and anaerobic bottles: Gram Negative Rods  Preliminary Report (06-08-22 @ 09:32):    Growth in aerobic and anaerobic bottles: Escherichia coli See previous    culture [id number]    Culture - Blood (collected 06-06-22 @ 18:49)  Source: .Blood Blood-Peripheral  Gram Stain (prelim) (06-07-22 @ 06:43):    Growth in anaerobic bottle:    Gram Negative Rods    Growth in aerobic bottle: Gram Negative Rods  Preliminary Report (06-08-22 @ 09:31):    Growth in aerobic and anaerobic bottles: Escherichia coli    ***Blood Panel PCR results on this specimen are available    approximately 3 hours after the Gram stain result.***    Gram stain, PCR, and/or culture results may not always    correspond due to difference in methodologies.    ************************************************************    This PCR assay was performed by multiplex PCR. This    Assay tests for 66 bacterial and resistance gene targets.    Please refer to the [facility] Labs test directory    at [email or web address] for details.  Organism: Blood Culture PCR (06-07-22 @ 08:03)  Organism: Blood Culture PCR (06-07-22 @ 08:03)      -  Escherichia coli: Detec      Method Type: PCR    Culture - Urine (collected 06-06-22 @ 18:44)  Source: Clean Catch Clean Catch (Midstream)  Preliminary Report (06-07-22 @ 16:12):    >100,000 CFU/ml Escherichia coli            v                      SARS-CoV-2 Result: NotDetec (06-06-22 @ 17:07)      RADIOLOGY:   SANDHYA JOSÉ  MRN-[id number]        Patient is a 76y old  Female who presents with a chief complaint of UTI, AMS (08 Jun 2022 14:05)      HPI:  Patient is a 76F with a PMH of CVA with R sided deficits, OA, HTN, HLD who presents to the ED for AMS.  Patient states that she was returning from the bathroom this morning when she suddenly felt dizzy and fell onto her back.  Patient found on the floor by family, required assistance to get back on her feet.  Family noted increased confusion after the fall and called EMS.  Patient currently has no active complaints.  Family at the bedside states she is currently at her baseline mental status.  Patient states she has had some dysuria for about a week but denies history of fever, chills, nausea, vomiting, chest pain, palpitations, cough, or dyspnea.  NKDA.  Febrile to 103.8, labs show leukocytosis and elevated creatinine.  Will admit to med surg.   (06 Jun 2022 17:47)    patient endorses that she was incontinent for the last several days prior to arrival, doesnt remember  if had dysuria, also endorses a dry cough   ID consulted for workup and antibiotic management     PAST MEDICAL & SURGICAL HISTORY:  CVA (cerebrovascular accident)      Arthritis      HTN (hypertension)          Allergies  No Known Allergies        ANTIMICROBIALS:  cefTRIAXone   IVPB 2000 every 24 hours      MEDICATIONS  (STANDING):  cefTRIAXone   IVPB   100 mL/Hr IV Intermittent (06-07-22 @ 17:18)    cefTRIAXone   IVPB   100 mL/Hr IV Intermittent (06-06-22 @ 16:48)        OTHER MEDS: MEDICATIONS  (STANDING):  ALBUTerol    90 MICROgram(s) HFA Inhaler 1 every 4 hours  amLODIPine   Tablet 10 daily  atorvastatin 40 at bedtime  heparin   Injectable 5000 every 12 hours      SOCIAL HISTORY:     denies smoking  denies etoh   denies drugs    FAMILY HISTORY:  FH: HTN (hypertension)        REVIEW OF SYSTEMS  [  ] ROS unobtainable because:    [X ] All other systems negative except as noted below:	    Constitutional:  [ ] fever [ ] chills  [ ] weight loss  [ ] weakness  Skin:  [ ] rash [ ] phlebitis	  Eyes: [ ] icterus [ ] pain  [ ] discharge	  ENMT: [ ] sore throat  [ ] thrush [ ] ulcers [ ] exudates  Respiratory: [ ] dyspnea [ ] hemoptysis [X ] cough [ ] sputum	  Cardiovascular:  [ ] chest pain [ ] palpitations [ ] edema	  Gastrointestinal:  [ ] nausea [ ] vomiting [ ] diarrhea [ ] constipation [ ] pain	  Genitourinary:  [ ] dysuria [X ] frequency [ ] hematuria [ ] discharge [ ] flank pain  [X ] incontinence  Musculoskeletal:  [ ] myalgias [ ] arthralgias [ ] arthritis  [ ] back pain  Neurological:  [ ] headache [ ] seizures  [ ] confusion/altered mental status  Psychiatric:  [ ] anxiety [ ] depression	  Hematology/Lymphatics:  [ ] lymphadenopathy  Endocrine:  [ ] adrenal [ ] thyroid  Allergic/Immunologic:	 [ ] transplant [ ] seasonal    Vital Signs Last 24 Hrs  T(F): 98.6 (06-08-22 @ 16:15), Max: 103.8 (06-06-22 @ 14:10)    Vital Signs Last 24 Hrs  HR: 86 (06-08-22 @ 16:15) (60 - 95)  BP: 115/79 (06-08-22 @ 16:15) (112/69 - 138/80)  RR: 18 (06-08-22 @ 16:15)  SpO2: 96% (06-08-22 @ 16:15) (95% - 99%)  Wt(kg): --    PHYSICAL EXAM:  Constitutional: non-toxic, no distress, obese   HEAD/EYES: anicteric, no conjunctival injection  ENT:  supple, no thrush  Cardiovascular:   normal S1, S2, no murmur, no edema  Respiratory:  clear BS bilaterally, no wheezes, no rales  GI:  soft, non-tender, normal bowel sounds  :  +ridley, no CVA tenderness  Musculoskeletal:  no synovitis, normal ROM  Neurologic: awake and alert, normal strength, no focal findings  Skin:  no rash, no erythema, no phlebitis  Heme/Onc: no lymphadenopathy   Psychiatric:  awake, alert, appropriate mood          WBC Count: 16.52 K/uL (06-08 @ 07:01)  WBC Count: 22.89 K/uL (06-07 @ 00:58)  WBC Count: 21.18 K/uL (06-06 @ 15:07)      Auto Neutrophil %: 89.0 % *H* (06-06-22 @ 15:07)  Auto Neutrophil #: 18.87 K/uL *H* (06-06-22 @ 15:07)                            9.4    16.52 )-----------( 221      ( 08 Jun 2022 07:01 )             27.5       06-08    134<L>  |  100  |  36<H>  ----------------------------<  177<H>  3.2<L>   |  24  |  1.89<H>    Ca    8.9      08 Jun 2022 07:01    TPro  6.6  /  Alb  2.0<L>  /  TBili  0.8  /  DBili  x   /  AST  65<H>  /  ALT  49  /  AlkPhos  154<H>  06-08      Creatinine Trend: 1.89<--, 2.28<--, 2.47<--        Lactate, Blood: 1.3 mmol/L (06-07-22 @ 06:04)  Lactate, Blood: 2.3 mmol/L (06-07-22 @ 00:58)  Lactate, Blood: 2.2 mmol/L (06-06-22 @ 18:47)      MICROBIOLOGY:    Culture - Blood (collected 06-06-22 @ 18:49)  Source: .Blood Blood-Peripheral  Gram Stain (prelim) (06-07-22 @ 06:45):    Growth in aerobic and anaerobic bottles: Gram Negative Rods  Preliminary Report (06-08-22 @ 09:32):    Growth in aerobic and anaerobic bottles: Escherichia coli See previous    culture [id number]    Culture - Blood (collected 06-06-22 @ 18:49)  Source: .Blood Blood-Peripheral  Gram Stain (prelim) (06-07-22 @ 06:43):    Growth in anaerobic bottle:    Gram Negative Rods    Growth in aerobic bottle: Gram Negative Rods  Preliminary Report (06-08-22 @ 09:31):    Growth in aerobic and anaerobic bottles: Escherichia coli    ***Blood Panel PCR results on this specimen are available    approximately 3 hours after the Gram stain result.***    Gram stain, PCR, and/or culture results may not always    correspond due to difference in methodologies.    ************************************************************    This PCR assay was performed by multiplex PCR. This    Assay tests for 66 bacterial and resistance gene targets.    Please refer to the [facility] Labs test directory    at [email or web address] for details.  Organism: Blood Culture PCR (06-07-22 @ 08:03)  Organism: Blood Culture PCR (06-07-22 @ 08:03)      -  Escherichia coli: Detec      Method Type: PCR    Culture - Urine (collected 06-06-22 @ 18:44)  Source: Clean Catch Clean Catch (Midstream)  Preliminary Report (06-07-22 @ 16:12):    >100,000 CFU/ml Escherichia coli      SARS-CoV-2 Result: NotDetec (06-06-22 @ 17:07)      RADIOLOGY:  < from: US Renal (06.08.22 @ 09:08) >  IMPRESSION:  Normal renal ultrasound. No hydronephrosis.    < from: CT Abdomen and Pelvis No Cont (06.06.22 @ 16:19) >  IMPRESSION:  Mild right hydroureteronephrosis to the level of the distal ureter   without obstructing calculus, possibly due to passed stone.    < end of copied text >

## 2022-06-08 NOTE — PROGRESS NOTE ADULT - PROBLEM SELECTOR PLAN 5
creatinine is improving no baseline for measurement has some ckd dating back to 2015 at least ckd stage 2-3   emmy- that was present on admission is improving    has a ridley and has mild right hydroureternephorosis  -- repeat US ordered by urology    urology consult appreciated

## 2022-06-08 NOTE — CONSULT NOTE ADULT - ASSESSMENT
76F with a PMH of CVA with R sided deficits, OA, HTN, HLD who presents to the ED for AMS.    Found have high fever  leukocytosis with left shift  GISEL   ecoli in urine and in blood  blood cultures sensitivities to ceftriaxone     Plan:  increased ceftriaxone to 2g   follow all cultures  can go to rehab with PO vantin 200mg BID for a total for 10 days   montior creatinine     Discussed with Dr. Matthew Delong DO  Infectious Disease Attending  Pager [phone]  After 5pm/weekends please call [phone] for all inquiries and new consults

## 2022-06-08 NOTE — PROGRESS NOTE ADULT - PROBLEM SELECTOR PLAN 11
mild wheeze   denies asthma copd  trial of duoneb   cxr clear and no cough mild wheeze   denies asthma copd  trial of duoneb   cxr clear and no cough  6/8/2022 per documentation by my colleague had some wheezing . don't appreciate it on examination today..

## 2022-06-08 NOTE — PROGRESS NOTE ADULT - SUBJECTIVE AND OBJECTIVE BOX
Patient is a 76y old  Female who presents with a chief complaint of UTI, AMS (2022 11:19)      INTERVAL HPI/OVERNIGHT EVENTS:      MEDICATIONS  (STANDING):  ALBUTerol    90 MICROgram(s) HFA Inhaler 1 Puff(s) Inhalation every 4 hours  amLODIPine   Tablet 10 milliGRAM(s) Oral daily  atorvastatin 40 milliGRAM(s) Oral at bedtime  cefTRIAXone   IVPB 1000 milliGRAM(s) IV Intermittent every 24 hours  heparin   Injectable 5000 Unit(s) SubCutaneous every 12 hours  lactated ringers. 1000 milliLiter(s) (100 mL/Hr) IV Continuous <Continuous>    MEDICATIONS  (PRN):      Allergies    No Known Allergies    Intolerances    Vital Signs Last 24 Hrs  T(C): 37.4 (2022 11:14), Max: 37.7 (2022 05:59)  T(F): 99.4 (2022 11:14), Max: 99.8 (2022 05:59)  HR: 85 (2022 11:14) (60 - 95)  BP: 112/69 (2022 11:14) (105/60 - 138/80)  BP(mean): --  RR: 19 (2022 11:14) (18 - 19)  SpO2: 97% (2022 11:14) (95% - 99%)  PHYSICAL EXAM:  GENERAL: NAD  HEAD:  Atraumatic, Normocephalic  EYES: EOMI, PERRLA, conjunctiva and sclera clear  ENMT: No tonsillar erythema, exudates, or enlargement;   NECK: Supple, Normal thyroid  NERVOUS SYSTEM:  Alert & Oriented X1, Motor Strength 4/5 left lower extremities; DTRs 2+ intact and symmetric  CHEST/LUNG: mild scattered wheeze. no distress   HEART: Regular rate and rhythm; No murmurs, rubs, or gallops  ABDOMEN: Soft, Nontender, Nondistended; Bowel sounds present  EXTREMITIES:  2+ Peripheral Pulses, No clubbing, cyanosis, or edema    SKIN: No rashes or lesions    LABS:                              9.4    16.52 )-----------( 221      ( 2022 07:01 )             27.5   06-08    134<L>  |  100  |  36<H>  ----------------------------<  177<H>  3.2<L>   |  24  |  1.89<H>    Ca    8.9      2022 07:01  Mg     2.3     -    TPro  6.6  /  Alb  2.0<L>  /  TBili  0.8  /  DBili  x   /  AST  65<H>  /  ALT  49  /  AlkPhos  154<H>  -      Urinalysis Basic - ( 2022 14:43 )    Color: Yellow / Appearance: very cloudy / S.010 / pH: x  Gluc: x / Ketone: Negative  / Bili: Negative / Urobili: 1 mg/dL   Blood: x / Protein: 100 mg/dL / Nitrite: Negative   Leuk Esterase: Moderate / RBC: 6-10 /HPF / WBC >50   Sq Epi: x / Non Sq Epi: Few / Bacteria: Many      CAPILLARY BLOOD GLUCOSE      CAPILLARY BLOOD GLUCOSE      A1C with Estimated Average Glucose (22 @ 08:57)    A1C with Estimated Average Glucose Result: 6.4: Method: Immunoassay       Reference Range                4.0-5.6%       High risk (prediabetic)        5.7-6.4%       Diabetic, diagnostic             >=6.5%       ADA diabetic treatment goal       <7.0%  The Hemoglobin A1c testing is NGSP-certified.Reference ranges are based  upon the 2010 recommendations of  the American Diabetes Association.  Interpretation may vary for children  and adolescents. %    Estimated Average Glucose: 137: The Estimated Average Glucose (eAG) or Mean Plasma Glucose (MPG) value is  calculated from the hemoglobin A1c value and covers the same time period.   The American Diabetes Association (ADA) and other professional  organizations recommend reporting the eAG with the HgbA1c. mg/dL        RADIOLOGY & ADDITIONAL TESTS:    Imaging Personally Reviewed:  [ ] YES  [ ] NO    Consultant(s) Notes Reviewed:  [ ] YES  [ ] NO    Care Discussed with Consultants/Other Providers [ ] YES  [ ] NO Patient is a 76y old  Female who presents with a chief complaint of UTI, AMS (2022 11:19)      INTERVAL HPI/OVERNIGHT EVENTS:      MEDICATIONS  (STANDING):  ALBUTerol    90 MICROgram(s) HFA Inhaler 1 Puff(s) Inhalation every 4 hours  amLODIPine   Tablet 10 milliGRAM(s) Oral daily  atorvastatin 40 milliGRAM(s) Oral at bedtime  cefTRIAXone   IVPB 1000 milliGRAM(s) IV Intermittent every 24 hours  heparin   Injectable 5000 Unit(s) SubCutaneous every 12 hours  lactated ringers. 1000 milliLiter(s) (100 mL/Hr) IV Continuous <Continuous>    MEDICATIONS  (PRN):      Allergies    No Known Allergies    Intolerances    Vital Signs Last 24 Hrs  T(C): 37.4 (2022 11:14), Max: 37.7 (2022 05:59)  T(F): 99.4 (2022 11:14), Max: 99.8 (2022 05:59)  HR: 85 (2022 11:14) (60 - 95)  BP: 112/69 (2022 11:14) (105/60 - 138/80)  BP(mean): --  RR: 19 (2022 11:14) (18 - 19)  SpO2: 97% (2022 11:14) (95% - 99%)  PHYSICAL EXAM:  GENERAL: NAD  HEAD:  Atraumatic, Normocephalic  EYES: EOMI, PERRLA, conjunctiva and sclera clear  ENMT: No tonsillar erythema, exudates, or enlargement;   NECK: Supple, Normal thyroid  NERVOUS SYSTEM:  Alert & Oriented , Motor Strength 4/5 left lower extremities; DTRs 2+ intact and symmetric  CHEST/LUNG: mild scattered wheeze. no distress   HEART: Regular rate and rhythm; No murmurs, rubs, or gallops  ABDOMEN: Soft, Nontender, Nondistended; Bowel sounds present  EXTREMITIES:  2+ Peripheral Pulses, No clubbing, cyanosis, or edema    SKIN: No rashes or lesions    LABS:                              9.4    16.52 )-----------( 221      ( 2022 07:01 )             27.5   06-08    134<L>  |  100  |  36<H>  ----------------------------<  177<H>  3.2<L>   |  24  |  1.89<H>    Ca    8.9      2022 07:01  Mg     2.3         TPro  6.6  /  Alb  2.0<L>  /  TBili  0.8  /  DBili  x   /  AST  65<H>  /  ALT  49  /  AlkPhos  154<H>  -      Urinalysis Basic - ( 2022 14:43 )    Color: Yellow / Appearance: very cloudy / S.010 / pH: x  Gluc: x / Ketone: Negative  / Bili: Negative / Urobili: 1 mg/dL   Blood: x / Protein: 100 mg/dL / Nitrite: Negative   Leuk Esterase: Moderate / RBC: 6-10 /HPF / WBC >50   Sq Epi: x / Non Sq Epi: Few / Bacteria: Many      CAPILLARY BLOOD GLUCOSE      CAPILLARY BLOOD GLUCOSE      A1C with Estimated Average Glucose (22 @ 08:57)    A1C with Estimated Average Glucose Result: 6.4: Method: Immunoassay       Reference Range                4.0-5.6%       High risk (prediabetic)        5.7-6.4%       Diabetic, diagnostic             >=6.5%       ADA diabetic treatment goal       <7.0%  The Hemoglobin A1c testing is NGSP-certified.Reference ranges are based  upon the 2010 recommendations of  the American Diabetes Association.  Interpretation may vary for children  and adolescents. %    Estimated Average Glucose: 137: The Estimated Average Glucose (eAG) or Mean Plasma Glucose (MPG) value is  calculated from the hemoglobin A1c value and covers the same time period.   The American Diabetes Association (ADA) and other professional  organizations recommend reporting the eAG with the HgbA1c. mg/dL        RADIOLOGY & ADDITIONAL TESTS:    Imaging Personally Reviewed:  [ ] YES  [ ] NO    Consultant(s) Notes Reviewed:  [ ] YES  [ ] NO    Care Discussed with Consultants/Other Providers [ ] YES  [ ] NO

## 2022-06-08 NOTE — PROGRESS NOTE ADULT - ASSESSMENT
[address]  [facility] [address]  Phone: [phone]    January 14, 2020        Lucina Santamaria  [address]  [place] [address]          To whom it may concern:    RE: Lucina Santamaria    Patient may return to work the week of 1/13/20 for max of 2 days,  with the following restrictions: no push,  pull, lift 15 lbs or greater. Patient may then work 3 days the week of 1/20/20 with same restrictions: no push, pull, lift greater than 15 lbs. Patient may then return to work full time status the week of 1/27/20 with the following restrictions: no lift, push , pull , greater than 15 lbs through 2/11/2020. Patient will be released to work full time no restrictions on 2/11/2020.    Please contact me for questions or concerns.      Sincerely,        Lynn Doshi MD   87 y/o female admitted with likely urospesis/pyleonephritis found to have GISEL and right hydroureteronephrois. Ucx: >100K E.coli, Bcx: >100K E.coli. Sensitivities pending     -f/u Cxs  -abx  -trend Cr  - Maintain ridley   -Repeat renal US tomorrow   -will follow along   87 y/o female admitted with likely urospesis/pyleonephritis found to have GISEL and right hydroureteronephrois. Ucx: >100K E.coli, Bcx: >100K E.coli. Sensitivities pending     -f/u Cxs  -abx  - Maintain ridley until Cr nadirs  -Repeat renal US tomorrow   -will follow along

## 2022-06-09 LAB
-  AMIKACIN: SIGNIFICANT CHANGE UP
-  AMPICILLIN/SULBACTAM: SIGNIFICANT CHANGE UP
-  AMPICILLIN: SIGNIFICANT CHANGE UP
-  AZTREONAM: SIGNIFICANT CHANGE UP
-  CEFAZOLIN: SIGNIFICANT CHANGE UP
-  CEFEPIME: SIGNIFICANT CHANGE UP
-  CEFOXITIN: SIGNIFICANT CHANGE UP
-  CEFTRIAXONE: SIGNIFICANT CHANGE UP
-  CIPROFLOXACIN: SIGNIFICANT CHANGE UP
-  ERTAPENEM: SIGNIFICANT CHANGE UP
-  GENTAMICIN: SIGNIFICANT CHANGE UP
-  IMIPENEM: SIGNIFICANT CHANGE UP
-  LEVOFLOXACIN: SIGNIFICANT CHANGE UP
-  MEROPENEM: SIGNIFICANT CHANGE UP
-  PIPERACILLIN/TAZOBACTAM: SIGNIFICANT CHANGE UP
-  TOBRAMYCIN: SIGNIFICANT CHANGE UP
-  TRIMETHOPRIM/SULFAMETHOXAZOLE: SIGNIFICANT CHANGE UP
ANION GAP SERPL CALC-SCNC: 7 MMOL/L — SIGNIFICANT CHANGE UP (ref 5–17)
BUN SERPL-MCNC: 28 MG/DL — HIGH (ref 7–23)
CALCIUM SERPL-MCNC: 8.5 MG/DL — SIGNIFICANT CHANGE UP (ref 8.5–10.1)
CHLORIDE SERPL-SCNC: 102 MMOL/L — SIGNIFICANT CHANGE UP (ref 96–108)
CO2 SERPL-SCNC: 26 MMOL/L — SIGNIFICANT CHANGE UP (ref 22–31)
CREAT SERPL-MCNC: 1.67 MG/DL — HIGH (ref 0.5–1.3)
CULTURE RESULTS: SIGNIFICANT CHANGE UP
CULTURE RESULTS: SIGNIFICANT CHANGE UP
EGFR: 31 ML/MIN/1.73M2 — LOW
GLUCOSE SERPL-MCNC: 147 MG/DL — HIGH (ref 70–99)
GRAM STN FLD: SIGNIFICANT CHANGE UP
GRAM STN FLD: SIGNIFICANT CHANGE UP
HCT VFR BLD CALC: 26.6 % — LOW (ref 34.5–45)
HGB BLD-MCNC: 9.1 G/DL — LOW (ref 11.5–15.5)
IRON SATN MFR SERPL: 19 % — SIGNIFICANT CHANGE UP (ref 14–50)
IRON SATN MFR SERPL: 40 UG/DL — SIGNIFICANT CHANGE UP (ref 30–160)
MAGNESIUM SERPL-MCNC: 2.4 MG/DL — SIGNIFICANT CHANGE UP (ref 1.6–2.6)
MCHC RBC-ENTMCNC: 26.8 PG — LOW (ref 27–34)
MCHC RBC-ENTMCNC: 34.2 G/DL — SIGNIFICANT CHANGE UP (ref 32–36)
MCV RBC AUTO: 78.2 FL — LOW (ref 80–100)
METHOD TYPE: SIGNIFICANT CHANGE UP
NRBC # BLD: 0 /100 WBCS — SIGNIFICANT CHANGE UP (ref 0–0)
ORGANISM # SPEC MICROSCOPIC CNT: SIGNIFICANT CHANGE UP
PHOSPHATE SERPL-MCNC: 2.1 MG/DL — LOW (ref 2.5–4.5)
PLATELET # BLD AUTO: 241 K/UL — SIGNIFICANT CHANGE UP (ref 150–400)
POTASSIUM SERPL-MCNC: 3.7 MMOL/L — SIGNIFICANT CHANGE UP (ref 3.5–5.3)
POTASSIUM SERPL-SCNC: 3.7 MMOL/L — SIGNIFICANT CHANGE UP (ref 3.5–5.3)
RBC # BLD: 3.4 M/UL — LOW (ref 3.8–5.2)
RBC # FLD: 16.3 % — HIGH (ref 10.3–14.5)
SODIUM SERPL-SCNC: 135 MMOL/L — SIGNIFICANT CHANGE UP (ref 135–145)
SPECIMEN SOURCE: SIGNIFICANT CHANGE UP
SPECIMEN SOURCE: SIGNIFICANT CHANGE UP
TIBC SERPL-MCNC: 208 UG/DL — LOW (ref 220–430)
TRANSFERRIN SERPL-MCNC: 172 MG/DL — LOW (ref 200–360)
UIBC SERPL-MCNC: 168 UG/DL — SIGNIFICANT CHANGE UP (ref 110–370)
WBC # BLD: 15.75 K/UL — HIGH (ref 3.8–10.5)
WBC # FLD AUTO: 15.75 K/UL — HIGH (ref 3.8–10.5)

## 2022-06-09 PROCEDURE — 99233 SBSQ HOSP IP/OBS HIGH 50: CPT

## 2022-06-09 PROCEDURE — 99232 SBSQ HOSP IP/OBS MODERATE 35: CPT

## 2022-06-09 PROCEDURE — 99232 SBSQ HOSP IP/OBS MODERATE 35: CPT | Mod: FS

## 2022-06-09 RX ORDER — LACTULOSE 10 G/15ML
10 SOLUTION ORAL
Refills: 0 | Status: COMPLETED | OUTPATIENT
Start: 2022-06-09 | End: 2022-06-10

## 2022-06-09 RX ADMIN — Medication 62.5 MILLIMOLE(S): at 12:30

## 2022-06-09 RX ADMIN — CEFTRIAXONE 100 MILLIGRAM(S): 500 INJECTION, POWDER, FOR SOLUTION INTRAMUSCULAR; INTRAVENOUS at 17:42

## 2022-06-09 RX ADMIN — POLYETHYLENE GLYCOL 3350 17 GRAM(S): 17 POWDER, FOR SOLUTION ORAL at 12:30

## 2022-06-09 RX ADMIN — HEPARIN SODIUM 5000 UNIT(S): 5000 INJECTION INTRAVENOUS; SUBCUTANEOUS at 17:42

## 2022-06-09 RX ADMIN — LACTULOSE 10 GRAM(S): 10 SOLUTION ORAL at 17:41

## 2022-06-09 RX ADMIN — ALBUTEROL 1 PUFF(S): 90 AEROSOL, METERED ORAL at 14:54

## 2022-06-09 RX ADMIN — ALBUTEROL 1 PUFF(S): 90 AEROSOL, METERED ORAL at 21:19

## 2022-06-09 RX ADMIN — ATORVASTATIN CALCIUM 40 MILLIGRAM(S): 80 TABLET, FILM COATED ORAL at 21:19

## 2022-06-09 RX ADMIN — AMLODIPINE BESYLATE 10 MILLIGRAM(S): 2.5 TABLET ORAL at 05:15

## 2022-06-09 RX ADMIN — ALBUTEROL 1 PUFF(S): 90 AEROSOL, METERED ORAL at 10:10

## 2022-06-09 RX ADMIN — ALBUTEROL 1 PUFF(S): 90 AEROSOL, METERED ORAL at 17:42

## 2022-06-09 RX ADMIN — ALBUTEROL 1 PUFF(S): 90 AEROSOL, METERED ORAL at 05:15

## 2022-06-09 RX ADMIN — HEPARIN SODIUM 5000 UNIT(S): 5000 INJECTION INTRAVENOUS; SUBCUTANEOUS at 05:15

## 2022-06-09 NOTE — PROGRESS NOTE ADULT - NSPROGADDITIONALINFOA_GEN_ALL_CORE
constipation   hypophosphatemia ; will be replaced constipation   hypophosphatemia ; will be replaced   tried both numbers in chart no answer

## 2022-06-09 NOTE — PROGRESS NOTE ADULT - SUBJECTIVE AND OBJECTIVE BOX
Patient seen and examined bedside resting comfortably.  No complaints offered.   Indwelling ridley with clear yellow urine     T(F): 98.7 (06-09-22 @ 05:04), Max: 98.7 (06-09-22 @ 05:04)  HR: 87 (06-09-22 @ 05:04) (86 - 91)  BP: 124/76 (06-09-22 @ 05:04) (115/79 - 146/83)  RR: 18 (06-09-22 @ 05:04) (17 - 18)  SpO2: 96% (06-09-22 @ 05:04) (96% - 96%)    ROS;  Negative unless otherwise stated      PHYSICAL EXAM:    General: NAD, alert and awake  HEENT: NCAT, EOMI, conjunctiva clear  Chest: nonlabored respirations, CTA b/l.  Abdomen: soft, NT/ND.   Extremities: Calf soft, nontender b/l.   : No suprapubic tenderness or bladder distention.  Indwelling ridley with clear yellow urine     LABS:                        9.1    15.75 )-----------( 241      ( 09 Jun 2022 07:11 )             26.6   06-09    135  |  102  |  28<H>  ----------------------------<  147<H>  3.7   |  26  |  1.67<H>    Ca    8.5      09 Jun 2022 07:11  Phos  2.1     06-09  Mg     2.4     06-09    TPro  6.6  /  Alb  2.0<L>  /  TBili  0.8  /  DBili  x   /  AST  65<H>  /  ALT  49  /  AlkPhos  154<H>  06-08    I&O's Detail    08 Jun 2022 07:01  -  09 Jun 2022 07:00  --------------------------------------------------------  IN:  Total IN: 0 mL    OUT:    Voided (mL): 2100 mL  Total OUT: 2100 mL    Total NET: -2100 mL

## 2022-06-09 NOTE — PROGRESS NOTE ADULT - SUBJECTIVE AND OBJECTIVE BOX
SANDHYA JOSÉ  MRN-[id number]      Follow Up:  bacteremia     Interval History: patient seen and examined. has no complaints, ridley still in    ROS:    [ ] Unobtainable because:  [ X] All other systems negative except as noted    Constitutional: no fever, no chills  Head: no trauma  Eyes: no vision changes, no eye pain  ENT:  no sore throat, no rhinorrhea  Cardiovascular:  no chest pain, no palpitation  Respiratory:  no SOB, no cough  GI:  no abd pain, no vomiting, no diarrhea  urinary: no dysuria, no hematuria, no flank pain  musculoskeletal:  no joint pain, no joint swelling  skin:  no rash  neurology:  no headache, no seizure, no change in mental status  psych: no anxiety, no depression         Allergies  No Known Allergies        ANTIMICROBIALS:  cefTRIAXone   IVPB 2000 every 24 hours      OTHER MEDS:  ALBUTerol    90 MICROgram(s) HFA Inhaler 1 Puff(s) Inhalation every 4 hours  amLODIPine   Tablet 10 milliGRAM(s) Oral daily  atorvastatin 40 milliGRAM(s) Oral at bedtime  heparin   Injectable 5000 Unit(s) SubCutaneous every 12 hours  lactated ringers. 1000 milliLiter(s) IV Continuous <Continuous>  lactulose Syrup 10 Gram(s) Oral two times a day  polyethylene glycol 3350 17 Gram(s) Oral daily      Physical Exam:  Vital Signs Last 24 Hrs  T(C): 36.9 (09 Jun 2022 17:00), Max: 37.1 (09 Jun 2022 05:04)  T(F): 98.4 (09 Jun 2022 17:00), Max: 98.7 (09 Jun 2022 05:04)  HR: 84 (09 Jun 2022 17:00) (84 - 88)  BP: 105/68 (09 Jun 2022 17:00) (105/68 - 124/76)  BP(mean): --  RR: 18 (09 Jun 2022 17:00) (18 - 19)  SpO2: 98% (09 Jun 2022 17:00) (96% - 98%)    General:    NAD,  non toxic  Head: atraumatic, normocephalic  Eye: normal sclera and conjunctiva  ENT:    no oral lesions, neck supple  Cardio:     regular S1, S2,  no murmur  Respiratory:    clear b/l,    no wheezing  abd:     soft,   BS +,   no tenderness  :   no CVAT,  no suprapubic tenderness,   +ridley  Musculoskeletal:   no joint swelling,   no edema  vascular: no central lines, +PIV   Skin:    no rash  Neurologic:     no focal deficit  psych: normal affect    WBC Count: 15.75 K/uL (06-09 @ 07:11)  WBC Count: 16.52 K/uL (06-08 @ 07:01)  WBC Count: 22.89 K/uL (06-07 @ 00:58)  WBC Count: 21.18 K/uL (06-06 @ 15:07)                            9.1    15.75 )-----------( 241      ( 09 Jun 2022 07:11 )             26.6       06-09    135  |  102  |  28<H>  ----------------------------<  147<H>  3.7   |  26  |  1.67<H>    Ca    8.5      09 Jun 2022 07:11  Phos  2.1     06-09  Mg     2.4     06-09    TPro  6.6  /  Alb  2.0<L>  /  TBili  0.8  /  DBili  x   /  AST  65<H>  /  ALT  49  /  AlkPhos  154<H>  06-08          Creatinine Trend: 1.67<--, 1.89<--, 2.28<--, 2.47<--      MICROBIOLOGY:  v  .Blood Blood-Peripheral  06-06-22   Growth in aerobic and anaerobic bottles: Escherichia coli  ***Blood Panel PCR results on this specimen are available  approximately 3 hours after the Gram stain result.***  Gram stain, PCR, and/or culture results may not always  correspond due to difference in methodologies.  ************************************************************  This PCR assay was performed by multiplex PCR. This  Assay tests for 66 bacterial and resistance gene targets.  Please refer to the [facility] test directory  at [email or web address] for details.  --  Blood Culture PCR  Escherichia coli      Clean Catch Clean Catch (Midstream)  06-06-22   >100,000 CFU/ml Escherichia coli  --  Escherichia coli      SARS-CoV-2 Result: Adriano (06-06-22 @ 17:07)        RADIOLOGY:

## 2022-06-09 NOTE — PROGRESS NOTE ADULT - PROBLEM SELECTOR PLAN 11
mild wheeze   denies asthma copd  trial of duoneb   cxr clear and no cough  6/8/2022 per documentation by my colleague had some wheezing . don't appreciate it on examination today..

## 2022-06-09 NOTE — PROGRESS NOTE ADULT - ASSESSMENT
76F with a PMH of CVA with R sided deficits, OA, HTN, HLD who presents to the ED for AMS.    Found have high fever  leukocytosis with left shift  GISEL   ecoli in urine and in blood  blood cultures sensitivities to ceftriaxone     6/9: ceftriaxone covering, creatinine and wbc getting better , no objection to discharge to rehab    Plan:  continue ceftriaxone to 2g daily while inpatient    follow all cultures  can go to rehab with PO vantin 200mg BID for a total for 10 days   monitor creatinine   monitor wbc   consider TOV     Will sign off. Please call with questions    Discussed with Dr. Matthew Delong, DO  Infectious Disease Attending  Pager [phone]  After 5pm/weekends please call [phone] for all inquiries and new consults

## 2022-06-09 NOTE — PROGRESS NOTE ADULT - PROBLEM SELECTOR PLAN 5
has some ckd dating back to 2015 at least ckd stage 2-3   emmy- that was present on admission is improving    has a ridley and has mild right hydroureternephorosis  -- repeat US ordered by urology    urology consult appreciated  6/9/2022 f/u renal US , creatinine continues to improve

## 2022-06-09 NOTE — PROGRESS NOTE ADULT - SUBJECTIVE AND OBJECTIVE BOX
Patient is a 76y old  Female who presents with a chief complaint of UTI, AMS (2022 11:19)      INTERVAL HPI/OVERNIGHT EVENTS:    MEDICATIONS  (STANDING):  ALBUTerol    90 MICROgram(s) HFA Inhaler 1 Puff(s) Inhalation every 4 hours  amLODIPine   Tablet 10 milliGRAM(s) Oral daily  atorvastatin 40 milliGRAM(s) Oral at bedtime  cefTRIAXone   IVPB 2000 milliGRAM(s) IV Intermittent every 24 hours  heparin   Injectable 5000 Unit(s) SubCutaneous every 12 hours  lactated ringers. 1000 milliLiter(s) (100 mL/Hr) IV Continuous <Continuous>  polyethylene glycol 3350 17 Gram(s) Oral daily  sodium phosphate IVPB 15 milliMole(s) IV Intermittent once    MEDICATIONS  (PRN):      Allergies    No Known Allergies    Intolerances    Vital Signs Last 24 Hrs  T(C): 37.1 (2022 05:04), Max: 37.4 (2022 11:14)  T(F): 98.7 (2022 05:04), Max: 99.4 (2022 11:14)  HR: 87 (2022 05:04) (85 - 92)  BP: 124/76 (2022 05:04) (112/69 - 146/83)  BP(mean): --  RR: 18 (2022 05:04) (17 - 19)  SpO2: 96% (2022 05:04) (96% - 97%)  PHYSICAL EXAM:  GENERAL: NAD  HEAD:  Atraumatic, Normocephalic  EYES: EOMI, PERRLA, conjunctiva and sclera clear  ENMT: No tonsillar erythema, exudates, or enlargement;   NECK: Supple, Normal thyroid  NERVOUS SYSTEM:  Alert & Oriented  Motor Strength 4/5 left lower extremities; DTRs 2+ intact and symmetric  CHEST/LUNG: mild scattered wheeze. no distress   HEART: Regular rate and rhythm; No murmurs, rubs, or gallops  ABDOMEN: Soft, Nontender, Nondistended; Bowel sounds present  EXTREMITIES:  2+ Peripheral Pulses, No clubbing, cyanosis, or edema    SKIN: No rashes or lesions    LABS:                            9.1    15.75 )-----------( 241      ( 2022 07:11 )             26.6   06-09    135  |  102  |  28<H>  ----------------------------<  147<H>  3.7   |  26  |  1.67<H>    Ca    8.5      2022 07:11  Phos  2.1       Mg     2.4         TPro  6.6  /  Alb  2.0<L>  /  TBili  0.8  /  DBili  x   /  AST  65<H>  /  ALT  49  /  AlkPhos  154<H>        Urinalysis Basic - ( 2022 14:43 )    Color: Yellow / Appearance: very cloudy / S.010 / pH: x  Gluc: x / Ketone: Negative  / Bili: Negative / Urobili: 1 mg/dL   Blood: x / Protein: 100 mg/dL / Nitrite: Negative   Leuk Esterase: Moderate / RBC: 6-10 /HPF / WBC >50   Sq Epi: x / Non Sq Epi: Few / Bacteria: Many      CAPILLARY BLOOD GLUCOSE      CAPILLARY BLOOD GLUCOSE      A1C with Estimated Average Glucose (22 @ 08:57)    A1C with Estimated Average Glucose Result: 6.4: Method: Immunoassay       Reference Range                4.0-5.6%       High risk (prediabetic)        5.7-6.4%       Diabetic, diagnostic             >=6.5%       ADA diabetic treatment goal       <7.0%  The Hemoglobin A1c testing is NGSP-certified.Reference ranges are based  upon the 2010 recommendations of  the American Diabetes Association.  Interpretation may vary for children  and adolescents. %    Estimated Average Glucose: 137: The Estimated Average Glucose (eAG) or Mean Plasma Glucose (MPG) value is  calculated from the hemoglobin A1c value and covers the same time period.   The American Diabetes Association (ADA) and other professional  organizations recommend reporting the eAG with the HgbA1c. mg/dL        RADIOLOGY & ADDITIONAL TESTS:    Imaging Personally Reviewed:  [ ] YES  [ ] NO    Consultant(s) Notes Reviewed:  [ ] YES  [ ] NO    Care Discussed with Consultants/Other Providers [ ] YES  [ ] NO

## 2022-06-09 NOTE — PROGRESS NOTE ADULT - ASSESSMENT
87 y/o female admitted with likely urospesis/pyleonephritis found to have GISEL and right hydroureteronephrois. Ucx: >100K E.coli, Bcx: >100K E.coli. Pan-sensitive, hydronephrosis resolved on most recent renal US    -Trend WBC, Cr  -Abx  -Maintain ridley until Cr nadirs  -Will follow along

## 2022-06-10 ENCOUNTER — TRANSCRIPTION ENCOUNTER (OUTPATIENT)
Age: 77
End: 2022-06-10

## 2022-06-10 LAB
ANION GAP SERPL CALC-SCNC: 7 MMOL/L — SIGNIFICANT CHANGE UP (ref 5–17)
BUN SERPL-MCNC: 23 MG/DL — SIGNIFICANT CHANGE UP (ref 7–23)
CALCIUM SERPL-MCNC: 8.5 MG/DL — SIGNIFICANT CHANGE UP (ref 8.5–10.1)
CHLORIDE SERPL-SCNC: 103 MMOL/L — SIGNIFICANT CHANGE UP (ref 96–108)
CO2 SERPL-SCNC: 26 MMOL/L — SIGNIFICANT CHANGE UP (ref 22–31)
CREAT SERPL-MCNC: 1.69 MG/DL — HIGH (ref 0.5–1.3)
EGFR: 31 ML/MIN/1.73M2 — LOW
GLUCOSE BLDC GLUCOMTR-MCNC: 150 MG/DL — HIGH (ref 70–99)
GLUCOSE SERPL-MCNC: 165 MG/DL — HIGH (ref 70–99)
HCT VFR BLD CALC: 25.2 % — LOW (ref 34.5–45)
HGB BLD-MCNC: 8.4 G/DL — LOW (ref 11.5–15.5)
MAGNESIUM SERPL-MCNC: 2.4 MG/DL — SIGNIFICANT CHANGE UP (ref 1.6–2.6)
MCHC RBC-ENTMCNC: 26.4 PG — LOW (ref 27–34)
MCHC RBC-ENTMCNC: 33.3 G/DL — SIGNIFICANT CHANGE UP (ref 32–36)
MCV RBC AUTO: 79.2 FL — LOW (ref 80–100)
NRBC # BLD: 0 /100 WBCS — SIGNIFICANT CHANGE UP (ref 0–0)
PHOSPHATE SERPL-MCNC: 3.1 MG/DL — SIGNIFICANT CHANGE UP (ref 2.5–4.5)
PLATELET # BLD AUTO: 257 K/UL — SIGNIFICANT CHANGE UP (ref 150–400)
POTASSIUM SERPL-MCNC: 3.7 MMOL/L — SIGNIFICANT CHANGE UP (ref 3.5–5.3)
POTASSIUM SERPL-SCNC: 3.7 MMOL/L — SIGNIFICANT CHANGE UP (ref 3.5–5.3)
RAPID RVP RESULT: SIGNIFICANT CHANGE UP
RBC # BLD: 3.18 M/UL — LOW (ref 3.8–5.2)
RBC # FLD: 16.5 % — HIGH (ref 10.3–14.5)
SARS-COV-2 RNA SPEC QL NAA+PROBE: SIGNIFICANT CHANGE UP
SODIUM SERPL-SCNC: 136 MMOL/L — SIGNIFICANT CHANGE UP (ref 135–145)
WBC # BLD: 17.86 K/UL — HIGH (ref 3.8–10.5)
WBC # FLD AUTO: 17.86 K/UL — HIGH (ref 3.8–10.5)

## 2022-06-10 PROCEDURE — 99233 SBSQ HOSP IP/OBS HIGH 50: CPT

## 2022-06-10 PROCEDURE — 99232 SBSQ HOSP IP/OBS MODERATE 35: CPT | Mod: FS

## 2022-06-10 PROCEDURE — 99232 SBSQ HOSP IP/OBS MODERATE 35: CPT

## 2022-06-10 RX ORDER — LACTULOSE 10 G/15ML
10 SOLUTION ORAL
Refills: 0 | Status: COMPLETED | OUTPATIENT
Start: 2022-06-10 | End: 2022-06-10

## 2022-06-10 RX ORDER — SOD,AMMONIUM,POTASSIUM LACTATE
1 CREAM (GRAM) TOPICAL
Refills: 0 | Status: DISCONTINUED | OUTPATIENT
Start: 2022-06-10 | End: 2022-06-14

## 2022-06-10 RX ADMIN — ALBUTEROL 1 PUFF(S): 90 AEROSOL, METERED ORAL at 05:39

## 2022-06-10 RX ADMIN — ALBUTEROL 1 PUFF(S): 90 AEROSOL, METERED ORAL at 17:47

## 2022-06-10 RX ADMIN — LACTULOSE 10 GRAM(S): 10 SOLUTION ORAL at 05:39

## 2022-06-10 RX ADMIN — ALBUTEROL 1 PUFF(S): 90 AEROSOL, METERED ORAL at 14:00

## 2022-06-10 RX ADMIN — Medication 1 APPLICATION(S): at 08:19

## 2022-06-10 RX ADMIN — LACTULOSE 10 GRAM(S): 10 SOLUTION ORAL at 22:56

## 2022-06-10 RX ADMIN — POLYETHYLENE GLYCOL 3350 17 GRAM(S): 17 POWDER, FOR SOLUTION ORAL at 12:41

## 2022-06-10 RX ADMIN — ALBUTEROL 1 PUFF(S): 90 AEROSOL, METERED ORAL at 21:40

## 2022-06-10 RX ADMIN — HEPARIN SODIUM 5000 UNIT(S): 5000 INJECTION INTRAVENOUS; SUBCUTANEOUS at 17:55

## 2022-06-10 RX ADMIN — ALBUTEROL 1 PUFF(S): 90 AEROSOL, METERED ORAL at 01:22

## 2022-06-10 RX ADMIN — HEPARIN SODIUM 5000 UNIT(S): 5000 INJECTION INTRAVENOUS; SUBCUTANEOUS at 05:38

## 2022-06-10 RX ADMIN — ATORVASTATIN CALCIUM 40 MILLIGRAM(S): 80 TABLET, FILM COATED ORAL at 21:38

## 2022-06-10 RX ADMIN — CEFTRIAXONE 100 MILLIGRAM(S): 500 INJECTION, POWDER, FOR SOLUTION INTRAMUSCULAR; INTRAVENOUS at 17:47

## 2022-06-10 RX ADMIN — ALBUTEROL 1 PUFF(S): 90 AEROSOL, METERED ORAL at 10:03

## 2022-06-10 RX ADMIN — LACTULOSE 10 GRAM(S): 10 SOLUTION ORAL at 12:41

## 2022-06-10 RX ADMIN — Medication 1 ENEMA: at 12:41

## 2022-06-10 RX ADMIN — Medication 1 APPLICATION(S): at 17:55

## 2022-06-10 NOTE — PROGRESS NOTE ADULT - PROBLEM SELECTOR PLAN 5
has some ckd dating back to 2015 at least ckd stage 2-3   emmy- that was present on admission is improving    has a ridley and has mild right hydroureternephorosis  -- repeat US ordered by urology    urology consult appreciated  6/9/2022 f/u renal US , creatinine continues to improve  6/10/2022 labs pending

## 2022-06-10 NOTE — PROGRESS NOTE ADULT - NS ATTEND AMEND GEN_ALL_CORE FT
I agree with the statements above  cath in place. Checking creatinine. Will F/U in office after UTI has been solved
Sepsis secondary to UTI. Conde catheter draining purulent urine. No CVA tenderness. Continue Antibiotics, and Conde
s/p UTI, resolved, PVR 35 ml. Resolved.  Follow as outpatient.  Thanks

## 2022-06-10 NOTE — PROGRESS NOTE ADULT - SUBJECTIVE AND OBJECTIVE BOX
SANDHYA JOSÉ  MRN-[id number]      Follow Up:  bacteremia     Interval History: patient seen and examined. has no complaints, ridley still in, had BM , creatinine close to baseline     ROS:    [ ] Unobtainable because:  [ X] All other systems negative except as noted          Allergies  No Known Allergies        ANTIMICROBIALS:    cefTRIAXone   IVPB 2000 every 24 hours      MEDICATIONS  (STANDING):  ALBUTerol    90 MICROgram(s) HFA Inhaler 1 every 4 hours  amLODIPine   Tablet 10 daily  atorvastatin 40 at bedtime  heparin   Injectable 5000 every 12 hours  lactulose Syrup 10 two times a day  polyethylene glycol 3350 17 daily      PRN      Physical Exam:  Vital Signs Last 24 Hrs  T(F): 98 (06-10-22 @ 16:42), Max: 98 (06-10-22 @ 16:42)  HR: 78 (06-10-22 @ 16:42)  BP: 136/82 (06-10-22 @ 16:42)  RR: 18 (06-10-22 @ 16:42)  SpO2: 97% (06-10-22 @ 16:42) (97% - 98%)  Wt(kg): --    General:    NAD,  non toxic  Head: atraumatic, normocephalic  Eye: normal sclera and conjunctiva  ENT:    no oral lesions, neck supple  Cardio:     regular S1, S2,  no murmur  Respiratory:    clear b/l,    no wheezing  abd:     soft,   BS +,   no tenderness  :   no CVAT,  no suprapubic tenderness,   +ridley  Musculoskeletal:   no joint swelling,   no edema  vascular: no central lines, +PIV   Skin:    no rash  Neurologic:     no focal deficit  psych: normal affect    WBC Count: 17.86 K/uL (06-10 @ 10:28)  WBC Count: 15.75 K/uL (06-09 @ 07:11)  WBC Count: 16.52 K/uL (06-08 @ 07:01)  WBC Count: 22.89 K/uL (06-07 @ 00:58)  WBC Count: 21.18 K/uL (06-06 @ 15:07)                            8.4    17.86 )-----------( 257      ( 10 Damaso 2022 10:28 )             25.2       06-10    136  |  103  |  23  ----------------------------<  165<H>  3.7   |  26  |  1.69<H>    Ca    8.5      10 Damaso 2022 10:28  Phos  3.1     06-10  Mg     2.4     06-10        Creatinine Trend: 1.69<--, 1.67<--, 1.89<--, 2.28<--, 2.47<--        MICROBIOLOGY:  v  .Blood Blood-Peripheral  06-06-22   Growth in aerobic and anaerobic bottles: Escherichia coli  ***Blood Panel PCR results on this specimen are available  approximately 3 hours after the Gram stain result.***  Gram stain, PCR, and/or culture results may not always  correspond due to difference in methodologies.  ************************************************************  This PCR assay was performed by multiplex PCR. This  Assay tests for 66 bacterial and resistance gene targets.  Please refer to the [facility] test directory  at [email or web address] for details.  --  Blood Culture PCR  Escherichia coli      Clean Catch Clean Catch (Midstream)  06-06-22   >100,000 CFU/ml Escherichia coli  --  Escherichia coli      SARS-CoV-2 Result: Adriano (06-06-22 @ 17:07)        RADIOLOGY:

## 2022-06-10 NOTE — PROGRESS NOTE ADULT - ASSESSMENT
76F with a PMH of CVA with R sided deficits, OA, HTN, HLD who presents to the ED for AMS.    Found have high fever  leukocytosis with left shift  GISEL   ecoli in urine and in blood  blood cultures sensitivities to ceftriaxone     6/9: ceftriaxone covering, creatinine and wbc getting better , no objection to discharge to rehab  6/10: continue ceftriaxone, discharge per medicine, monitor wbc, creatinine close to baseline, TOV, anemia workup ongoing per medicine    Plan:  continue ceftriaxone to 2g daily while inpatient    follow all cultures  can go to rehab with PO vantin 200mg BID for a total for 10 days   monitor creatinine   monitor wbc   TOV   monitor hb and transfuse if necessary     Will sign off. Please call with questions    Discussed with Dr. Matthew Delong, DO  Infectious Disease Attending  Pager [phone]  After 5pm/weekends please call [phone] for all inquiries and new consults

## 2022-06-10 NOTE — PROGRESS NOTE ADULT - SUBJECTIVE AND OBJECTIVE BOX
Patient seen and examined bedside resting comfortably.  No complaints offered.   Voiding spontaneously without difficulty.  Denies hematuria and dysuria. Denies nausea and vomiting. Tolerating diet.  Denies chest pain, dyspnea, cough.    T(F): 97.8 (06-10-22 @ 05:03), Max: 98.4 (06-09-22 @ 17:00)  HR: 82 (06-10-22 @ 05:03) (77 - 86)  BP: 117/77 (06-10-22 @ 05:03) (105/68 - 117/77)  RR: 17 (06-10-22 @ 05:03) (17 - 18)  SpO2: 98% (06-10-22 @ 05:03) (97% - 98%)      ROS:  Negative unless otherwise stated      PHYSICAL EXAM:    General: NAD, alert and awake  HEENT: NCAT, EOMI, conjunctiva clear  Chest: nonlabored respirations, CTA b/l.  Abdomen: soft, NT/ND.   Extremities: Calf soft, nontender b/l.   : No suprapubic tenderness or bladder distention.      LABS:                        8.4    17.86 )-----------( 257      ( 10 Damaso 2022 10:28 )             25.2   06-10    136  |  103  |  23  ----------------------------<  165<H>  3.7   |  26  |  1.69<H>    Ca    8.5      10 Damaso 2022 10:28  Phos  3.1     06-10  Mg     2.4     06-10      I&O's Detail    09 Jun 2022 07:01  -  10 Damaso 2022 07:00  --------------------------------------------------------  IN:    Oral Fluid: 780 mL  Total IN: 780 mL    OUT:    Voided (mL): 2650 mL  Total OUT: 2650 mL    Total NET: -1870 mL     Patient seen and examined bedside resting comfortably.  No complaints offered.   Indwelling ridley with clear yellow urine    T(F): 97.8 (06-10-22 @ 05:03), Max: 98.4 (06-09-22 @ 17:00)  HR: 82 (06-10-22 @ 05:03) (77 - 86)  BP: 117/77 (06-10-22 @ 05:03) (105/68 - 117/77)  RR: 17 (06-10-22 @ 05:03) (17 - 18)  SpO2: 98% (06-10-22 @ 05:03) (97% - 98%)      ROS:  Negative unless otherwise stated      PHYSICAL EXAM:    General: NAD, alert and awake  HEENT: NCAT, EOMI, conjunctiva clear  Chest: nonlabored respirations, CTA b/l.  Abdomen: soft, NT/ND.   Extremities: Calf soft, nontender b/l.   : No suprapubic tenderness or bladder distention.  Indwelling ridley with clear yellow urine    LABS:                        8.4    17.86 )-----------( 257      ( 10 Damaso 2022 10:28 )             25.2   06-10    136  |  103  |  23  ----------------------------<  165<H>  3.7   |  26  |  1.69<H>    Ca    8.5      10 Damaso 2022 10:28  Phos  3.1     06-10  Mg     2.4     06-10      I&O's Detail    09 Jun 2022 07:01  -  10 Damaso 2022 07:00  --------------------------------------------------------  IN:    Oral Fluid: 780 mL  Total IN: 780 mL    OUT:    Voided (mL): 2650 mL  Total OUT: 2650 mL    Total NET: -1870 mL

## 2022-06-10 NOTE — PROGRESS NOTE ADULT - ASSESSMENT
87 y/o female admitted with likely urospesis/pyleonephritis found to have GISEL and right hydroureteronephrois. Ucx: >100K E.coli, Bcx: >100K E.coli. Pan-sensitive, hydronephrosis resolved on most recent renal US    -Trend WBC, Cr  -Abx  -Can ToV today, baseline Cr around 1.6  -Will follow along

## 2022-06-10 NOTE — DISCHARGE NOTE NURSING/CASE MANAGEMENT/SOCIAL WORK - NSDCPEFALRISK_GEN_ALL_CORE
For information on Fall & Injury Prevention, visit: [email or web address] OR  [email or web address] OR  https://www.cdc.gov/steadi/patient.html

## 2022-06-10 NOTE — DISCHARGE NOTE NURSING/CASE MANAGEMENT/SOCIAL WORK - PATIENT PORTAL LINK FT
You can access the FollowMyHealth Patient Portal offered by [facility] by registering at the following website: [email or web address]. By joining [employer]’s FollowMyHealth portal, you will also be able to view your health information using other applications (apps) compatible with our system.

## 2022-06-10 NOTE — PROGRESS NOTE ADULT - NSPROGADDITIONALINFOA_GEN_ALL_CORE
Passed ToV PVR 35, nothing further from urology, patient can followup outpatient with Dr. Kim or Dr. Barbosa as needed

## 2022-06-10 NOTE — PROGRESS NOTE ADULT - SUBJECTIVE AND OBJECTIVE BOX
Patient is a 76y old  Female who presents with a chief complaint of UTI, AMS (2022 11:19)      INTERVAL HPI/OVERNIGHT EVENTS:    MEDICATIONS  (STANDING):  ALBUTerol    90 MICROgram(s) HFA Inhaler 1 Puff(s) Inhalation every 4 hours  amLODIPine   Tablet 10 milliGRAM(s) Oral daily  ammonium lactate 12% Lotion 1 Application(s) Topical two times a day  atorvastatin 40 milliGRAM(s) Oral at bedtime  cefTRIAXone   IVPB 2000 milliGRAM(s) IV Intermittent every 24 hours  heparin   Injectable 5000 Unit(s) SubCutaneous every 12 hours  lactated ringers. 1000 milliLiter(s) (100 mL/Hr) IV Continuous <Continuous>  polyethylene glycol 3350 17 Gram(s) Oral daily    MEDICATIONS  (PRN):    Allergies    No Known Allergies    Intolerances  MEDICATIONS  (STANDING):  ALBUTerol    90 MICROgram(s) HFA Inhaler 1 Puff(s) Inhalation every 4 hours  amLODIPine   Tablet 10 milliGRAM(s) Oral daily  ammonium lactate 12% Lotion 1 Application(s) Topical two times a day  atorvastatin 40 milliGRAM(s) Oral at bedtime  cefTRIAXone   IVPB 2000 milliGRAM(s) IV Intermittent every 24 hours  heparin   Injectable 5000 Unit(s) SubCutaneous every 12 hours  lactated ringers. 1000 milliLiter(s) (100 mL/Hr) IV Continuous <Continuous>  polyethylene glycol 3350 17 Gram(s) Oral daily    MEDICATIONS  (PRN):      Vital Signs Last 24 Hrs  T(C): 36.6 (10 Damaso 2022 05:03), Max: 36.9 (2022 11:18)  T(F): 97.8 (10 Damaso 2022 05:03), Max: 98.5 (2022 11:18)  HR: 82 (10 Damaso 2022 05:03) (77 - 88)  BP: 117/77 (10 Damaso 2022 05:03) (105/68 - 117/77)  BP(mean): --  RR: 17 (10 Damaso 2022 05:03) (17 - 19)  SpO2: 98% (10 Damaso 2022 05:03) (97% - 98%)    PHYSICAL EXAM:  GENERAL: NAD  HEAD:  Atraumatic, Normocephalic  EYES: EOMI, PERRLA, conjunctiva and sclera clear  ENMT: No tonsillar erythema, exudates, or enlargement;   NECK: Supple, Normal thyroid  NERVOUS SYSTEM:  Alert & Oriented  Motor Strength 4/5 left lower extremities; DTRs 2+ intact and symmetric  CHEST/LUNG: mild scattered wheeze. no distress   HEART: Regular rate and rhythm; No murmurs, rubs, or gallops  ABDOMEN: Soft, Nontender, Nondistended; Bowel sounds present  EXTREMITIES:  2+ Peripheral Pulses, No clubbing, cyanosis, or edema    SKIN: No rashes or lesions    LABS:                      Urinalysis Basic - ( 2022 14:43 )    Color: Yellow / Appearance: very cloudy / S.010 / pH: x  Gluc: x / Ketone: Negative  / Bili: Negative / Urobili: 1 mg/dL   Blood: x / Protein: 100 mg/dL / Nitrite: Negative   Leuk Esterase: Moderate / RBC: 6-10 /HPF / WBC >50   Sq Epi: x / Non Sq Epi: Few / Bacteria: Many      CAPILLARY BLOOD GLUCOSE      CAPILLARY BLOOD GLUCOSE      A1C with Estimated Average Glucose (22 @ 08:57)    A1C with Estimated Average Glucose Result: 6.4: Method: Immunoassay       Reference Range                4.0-5.6%       High risk (prediabetic)        5.7-6.4%       Diabetic, diagnostic             >=6.5%       ADA diabetic treatment goal       <7.0%  The Hemoglobin A1c testing is NGSP-certified.Reference ranges are based  upon the 2010 recommendations of  the American Diabetes Association.  Interpretation may vary for children  and adolescents. %    Estimated Average Glucose: 137: The Estimated Average Glucose (eAG) or Mean Plasma Glucose (MPG) value is  calculated from the hemoglobin A1c value and covers the same time period.   The American Diabetes Association (ADA) and other professional  organizations recommend reporting the eAG with the HgbA1c. mg/dL        RADIOLOGY & ADDITIONAL TESTS:    Imaging Personally Reviewed:  [ ] YES  [ ] NO    Consultant(s) Notes Reviewed:  [ ] YES  [ ] NO    Care Discussed with Consultants/Other Providers [ ] YES  [ ] NO

## 2022-06-11 DIAGNOSIS — D72.829 ELEVATED WHITE BLOOD CELL COUNT, UNSPECIFIED: ICD-10-CM

## 2022-06-11 LAB
ANION GAP SERPL CALC-SCNC: 8 MMOL/L — SIGNIFICANT CHANGE UP (ref 5–17)
BUN SERPL-MCNC: 23 MG/DL — SIGNIFICANT CHANGE UP (ref 7–23)
CALCIUM SERPL-MCNC: 8.8 MG/DL — SIGNIFICANT CHANGE UP (ref 8.5–10.1)
CHLORIDE SERPL-SCNC: 104 MMOL/L — SIGNIFICANT CHANGE UP (ref 96–108)
CO2 SERPL-SCNC: 27 MMOL/L — SIGNIFICANT CHANGE UP (ref 22–31)
CREAT SERPL-MCNC: 1.54 MG/DL — HIGH (ref 0.5–1.3)
EGFR: 35 ML/MIN/1.73M2 — LOW
FERRITIN SERPL-MCNC: 420 NG/ML — HIGH (ref 15–150)
GLUCOSE SERPL-MCNC: 127 MG/DL — HIGH (ref 70–99)
HCT VFR BLD CALC: 29.2 % — LOW (ref 34.5–45)
HGB BLD-MCNC: 9.6 G/DL — LOW (ref 11.5–15.5)
MAGNESIUM SERPL-MCNC: 2.3 MG/DL — SIGNIFICANT CHANGE UP (ref 1.6–2.6)
MCHC RBC-ENTMCNC: 26.5 PG — LOW (ref 27–34)
MCHC RBC-ENTMCNC: 32.9 G/DL — SIGNIFICANT CHANGE UP (ref 32–36)
MCV RBC AUTO: 80.7 FL — SIGNIFICANT CHANGE UP (ref 80–100)
NRBC # BLD: 0 /100 WBCS — SIGNIFICANT CHANGE UP (ref 0–0)
PHOSPHATE SERPL-MCNC: 3 MG/DL — SIGNIFICANT CHANGE UP (ref 2.5–4.5)
PLATELET # BLD AUTO: 331 K/UL — SIGNIFICANT CHANGE UP (ref 150–400)
POTASSIUM SERPL-MCNC: 3.6 MMOL/L — SIGNIFICANT CHANGE UP (ref 3.5–5.3)
POTASSIUM SERPL-SCNC: 3.6 MMOL/L — SIGNIFICANT CHANGE UP (ref 3.5–5.3)
RBC # BLD: 3.62 M/UL — LOW (ref 3.8–5.2)
RBC # FLD: 16.8 % — HIGH (ref 10.3–14.5)
SODIUM SERPL-SCNC: 139 MMOL/L — SIGNIFICANT CHANGE UP (ref 135–145)
WBC # BLD: 18.46 K/UL — HIGH (ref 3.8–10.5)
WBC # FLD AUTO: 18.46 K/UL — HIGH (ref 3.8–10.5)

## 2022-06-11 PROCEDURE — 99233 SBSQ HOSP IP/OBS HIGH 50: CPT

## 2022-06-11 RX ADMIN — ALBUTEROL 1 PUFF(S): 90 AEROSOL, METERED ORAL at 06:31

## 2022-06-11 RX ADMIN — ALBUTEROL 1 PUFF(S): 90 AEROSOL, METERED ORAL at 15:50

## 2022-06-11 RX ADMIN — SODIUM CHLORIDE 100 MILLILITER(S): 9 INJECTION, SOLUTION INTRAVENOUS at 12:59

## 2022-06-11 RX ADMIN — HEPARIN SODIUM 5000 UNIT(S): 5000 INJECTION INTRAVENOUS; SUBCUTANEOUS at 05:41

## 2022-06-11 RX ADMIN — Medication 1 APPLICATION(S): at 05:39

## 2022-06-11 RX ADMIN — Medication 1 APPLICATION(S): at 17:50

## 2022-06-11 RX ADMIN — ATORVASTATIN CALCIUM 40 MILLIGRAM(S): 80 TABLET, FILM COATED ORAL at 22:17

## 2022-06-11 RX ADMIN — AMLODIPINE BESYLATE 10 MILLIGRAM(S): 2.5 TABLET ORAL at 05:41

## 2022-06-11 RX ADMIN — ALBUTEROL 1 PUFF(S): 90 AEROSOL, METERED ORAL at 10:16

## 2022-06-11 RX ADMIN — POLYETHYLENE GLYCOL 3350 17 GRAM(S): 17 POWDER, FOR SOLUTION ORAL at 12:53

## 2022-06-11 RX ADMIN — ALBUTEROL 1 PUFF(S): 90 AEROSOL, METERED ORAL at 12:52

## 2022-06-11 RX ADMIN — CEFTRIAXONE 100 MILLIGRAM(S): 500 INJECTION, POWDER, FOR SOLUTION INTRAMUSCULAR; INTRAVENOUS at 17:41

## 2022-06-11 RX ADMIN — ALBUTEROL 1 PUFF(S): 90 AEROSOL, METERED ORAL at 17:38

## 2022-06-11 RX ADMIN — HEPARIN SODIUM 5000 UNIT(S): 5000 INJECTION INTRAVENOUS; SUBCUTANEOUS at 17:41

## 2022-06-11 RX ADMIN — ALBUTEROL 1 PUFF(S): 90 AEROSOL, METERED ORAL at 22:16

## 2022-06-11 NOTE — PROGRESS NOTE ADULT - ASSESSMENT
76F with a PMH of CVA with R sided deficits, OA, HTN, HLD who presents to the ED for AMS.    Found have high fever  leukocytosis with left shift  GISEL   ecoli in urine and in blood  blood cultures sensitivities to ceftriaxone     6/9: ceftriaxone covering, creatinine and wbc getting better , no objection to discharge to rehab  6/10: continue ceftriaxone, discharge per medicine, monitor wbc, creatinine close to baseline, TOV, anemia workup ongoing per medicine  6/11: White blood cell count in approximately the same range.  All data reviewed.  All prior white blood cell counts that I can find prior to admission have been in the normal range, making a myeloproliferative process unlikely.  Her platelet count here is normal, speaking against uncontrolled infection, but also speaking against reactive leukocytosis as well.  She has complaint of cough, but she has been afebrile, she has no chest pain, is comfortable on room air, and her lung exam is clear.  Her abdominal exam is benign.  There is no CVA tenderness to suggest pyelonephritis.  She has no evidence of IV catheter related phlebitis.  She was cleared by urology and passed her trial of void.  However with effective antibiotics I would have hoped that her white blood cell count would have moderated more.  If the white blood cell count fails to improve, she may be repeat abdominal imaging, as the initial CT scans were without IV or oral contrast.  Urine and blood isolates are both E. coli, but the sensitivity profile is minimally different (e.g. Cipro resistance in 1, gentamicin BLANCHE higher than the other specimen).  However urosepsis here is still the most likely diagnosis.  Patient is hemoglobin is falling slightly, but doubt retroperitoneal hematoma etc.    Suggestions  Will repeat blood cultures now  Check chest x-ray given complaints of cough  If develops fever, check RVP to screen for COVID-19 and other respiratory viruses  For now continue ceftriaxone  Trend CBC    Reviewed with Dr. Castro    I'm available for issues over the remainder of the weekend, please call ([phone]) if ID input needed.    Trung Waldron MD  Attending Physician  [facility]  Division of Infectious Diseases  [phone]

## 2022-06-11 NOTE — PROGRESS NOTE ADULT - SUBJECTIVE AND OBJECTIVE BOX
Patient is a 76y old  Female who presents with a chief complaint of UTI, AMS (2022 11:19)      INTERVAL HPI/OVERNIGHT EVENTS:  MEDICATIONS  (STANDING):  ALBUTerol    90 MICROgram(s) HFA Inhaler 1 Puff(s) Inhalation every 4 hours  amLODIPine   Tablet 10 milliGRAM(s) Oral daily  ammonium lactate 12% Lotion 1 Application(s) Topical two times a day  atorvastatin 40 milliGRAM(s) Oral at bedtime  cefTRIAXone   IVPB 2000 milliGRAM(s) IV Intermittent every 24 hours  heparin   Injectable 5000 Unit(s) SubCutaneous every 12 hours  lactated ringers. 1000 milliLiter(s) (100 mL/Hr) IV Continuous <Continuous>  polyethylene glycol 3350 17 Gram(s) Oral daily    MEDICATIONS  (PRN):    Allergies    No Known Allergies      Vital Signs Last 24 Hrs  T(C): 36.8 (2022 12:47), Max: 36.8 (10 Damaso 2022 23:33)  T(F): 98.3 (2022 12:47), Max: 98.3 (10 Damaso 2022 23:33)  HR: 91 (2022 12:47) (76 - 91)  BP: 121/77 (2022 12:47) (121/77 - 142/86)  BP(mean): --  RR: 18 (2022 12:47) (18 - 18)  SpO2: 98% (2022 12:47) (95% - 98%)    PHYSICAL EXAM:  GENERAL: NAD  HEAD:  Atraumatic, Normocephalic  EYES: EOMI, PERRLA, conjunctiva and sclera clear  ENMT: No tonsillar erythema, exudates, or enlargement;   NECK: Supple, Normal thyroid  NERVOUS SYSTEM:  Alert & Oriented  Motor Strength 4/5 left lower extremities; DTRs 2+ intact and symmetric  CHEST/LUNG: mild scattered wheeze. no distress   HEART: Regular rate and rhythm; No murmurs, rubs, or gallops  ABDOMEN: Soft, Nontender, Nondistended; Bowel sounds present  EXTREMITIES:  2+ Peripheral Pulses, No clubbing, cyanosis, or edema    SKIN: No rashes or lesions    LABS:                          9.6    18.46 )-----------( 331      ( 2022 07:39 )             29.2   06-11    139  |  104  |  23  ----------------------------<  127<H>  3.6   |  27  |  1.54<H>    Ca    8.8      2022 07:39  Phos  3.0     06-11  Mg     2.3     06-11                      Urinalysis Basic - ( 2022 14:43 )    Color: Yellow / Appearance: very cloudy / S.010 / pH: x  Gluc: x / Ketone: Negative  / Bili: Negative / Urobili: 1 mg/dL   Blood: x / Protein: 100 mg/dL / Nitrite: Negative   Leuk Esterase: Moderate / RBC: 6-10 /HPF / WBC >50   Sq Epi: x / Non Sq Epi: Few / Bacteria: Many      CAPILLARY BLOOD GLUCOSE      CAPILLARY BLOOD GLUCOSE      A1C with Estimated Average Glucose (22 @ 08:57)    A1C with Estimated Average Glucose Result: 6.4: Method: Immunoassay       Reference Range                4.0-5.6%       High risk (prediabetic)        5.7-6.4%       Diabetic, diagnostic             >=6.5%       ADA diabetic treatment goal       <7.0%  The Hemoglobin A1c testing is NGSP-certified.Reference ranges are based  upon the 2010 recommendations of  the American Diabetes Association.  Interpretation may vary for children  and adolescents. %    Estimated Average Glucose: 137: The Estimated Average Glucose (eAG) or Mean Plasma Glucose (MPG) value is  calculated from the hemoglobin A1c value and covers the same time period.   The American Diabetes Association (ADA) and other professional  organizations recommend reporting the eAG with the HgbA1c. mg/dL        RADIOLOGY & ADDITIONAL TESTS:    Imaging Personally Reviewed:  [ ] YES  [ ] NO    Consultant(s) Notes Reviewed:  [ ] YES  [ ] NO    Care Discussed with Consultants/Other Providers [ ] YES  [ ] NO

## 2022-06-11 NOTE — PROGRESS NOTE ADULT - PROBLEM SELECTOR PLAN 5
has some ckd dating back to 2015 at least ckd stage 2-3   emmy- that was present on admission is improving    has a ridley and has mild right hydroureternephorosis  -- repeat US ordered by urology    urology consult appreciated  6/9/2022 f/u renal US , creatinine continues to improve  6/10/2022 labs pending  6/11/2022 creatinine continued improvement , TOV prior to d/c and there has been resolution of right hydronephrosis

## 2022-06-11 NOTE — PROGRESS NOTE ADULT - SUBJECTIVE AND OBJECTIVE BOX
[facility]  Division of Infectious Diseases  [phone]    Name: SANDHYA JOSÉ  Age: 76y  Gender: Female  MRN: [id number]    Interval History--  Asked to reassess patient for increase in WBC compared with yesterday. Notes reviewed.     Past Medical History--  CVA (cerebrovascular accident)    Arthritis    HTN (hypertension)        For details regarding the patient's social history, family history, and other miscellaneous elements, please refer the initial infectious diseases consultation and/or the admitting history and physical examination for this admission.    Allergies    No Known Allergies    Intolerances        Medications--  Antibiotics:  cefTRIAXone   IVPB 2000 milliGRAM(s) IV Intermittent every 24 hours    Immunologic:    Other:  ALBUTerol    90 MICROgram(s) HFA Inhaler  amLODIPine   Tablet  ammonium lactate 12% Lotion  atorvastatin  heparin   Injectable  lactated ringers.  polyethylene glycol 3350      Review of Systems--  A 10-point review of systems was obtained.     Pertinent positives and negatives--  Constitutional: No fevers. No Chills. No Rigors.   Cardiovascular: No chest pain. No palpitations.  Respiratory: No shortness of breath. No cough.  Gastrointestinal: No nausea or vomiting. No diarrhea or constipation.   Psychiatric: Pleasant. Appropriate affect.    Review of systems otherwise negative except as previously noted.    Physical Examination--  Vital Signs: T(F): 98.2 (06-11-22 @ 05:07), Max: 98.3 (06-10-22 @ 23:33)  HR: 77 (06-11-22 @ 05:07)  BP: 142/86 (06-11-22 @ 05:07)  RR: 18 (06-11-22 @ 05:07)  SpO2: 95% (06-11-22 @ 05:07)  Wt(kg): --  General: Nontoxic-appearing Female in no acute distress.  HEENT: AT/NC. PERRL. EOMI. Anicteric. Conjunctiva pink and moist. Oropharynx clear. Dentition fair.  Neck: Not rigid. No sense of mass.  Nodes: None palpable.  Lungs: Clear bilaterally without rales, wheezing or rhonchi  Heart: Regular rate and rhythm. No Murmur. No rub. No gallop. No palpable thrill.  Abdomen: Bowel sounds present and normoactive. Soft. Nondistended. Nontender. No sense of mass. No organomegaly.  Back: No spinal tenderness. No costovertebral angle tenderness.   Extremities: No cyanosis or clubbing. No edema.   Skin: Warm. Dry. Good turgor. No rash. No vasculitic stigmata.  Psychiatric: Appropriate affect and mood for situation.         Laboratory Studies--  CBC                        9.6    18.46 )-----------( 331      ( 11 Jun 2022 07:39 )             29.2     WBC Count: 18.46 K/uL (06-11-22 @ 07:39)  WBC Count: 17.86 K/uL (06-10-22 @ 10:28)  WBC Count: 15.75 K/uL (06-09-22 @ 07:11)  WBC Count: 16.52 K/uL (06-08-22 @ 07:01)  WBC Count: 22.89 K/uL (06-07-22 @ 00:58)  WBC Count: 21.18 K/uL (06-06-22 @ 15:07)      Chemistries  06-11    139  |  104  |  23  ----------------------------<  127<H>  3.6   |  27  |  1.54<H>    Creatinine Trend  1.54 mg/dL (06-11-22 @ 07:39)  1.69 mg/dL (06-10-22 @ 10:28)  1.67 mg/dL (06-09-22 @ 07:11)  1.89 mg/dL (06-08-22 @ 07:01)  2.28 mg/dL (06-07-22 @ 00:58)  2.47 mg/dL (06-06-22 @ 15:07)      Ca    8.8      11 Jun 2022 07:39  Phos  3.0     06-11  Mg     2.3     06-11        Culture Data    Culture - Blood (collected 06 Jun 2022 18:49)  Source: .Blood Blood-Peripheral  Gram Stain (09 Jun 2022 16:54):    Growth in aerobic and anaerobic bottles: Gram Negative Rods  Final Report (09 Jun 2022 16:54):    Growth in aerobic and anaerobic bottles: Escherichia coli See previous    culture [id number]    Culture - Blood (collected 06 Jun 2022 18:49)  Source: .Blood Blood-Peripheral  Gram Stain (09 Jun 2022 09:24):    Growth in anaerobic bottle:    Gram Negative Rods    Growth in aerobic bottle: Gram Negative Rods  Final Report (09 Jun 2022 09:24):    Growth in aerobic and anaerobic bottles: Escherichia coli    ***Blood Panel PCR results on this specimen are available    approximately 3 hours after the Gram stain result.***    Gram stain, PCR, and/or culture results may not always    correspond due to difference in methodologies.    ************************************************************    This PCR assay was performed by multiplex PCR. This    Assay tests for 66 bacterial and resistance gene targets.    Please refer to the [facility] test directory    at [email or web address] for details.  Organism: Blood Culture PCR  Escherichia coli (09 Jun 2022 09:24)  Organism: Escherichia coli (09 Jun 2022 09:24)  Organism: Blood Culture PCR (09 Jun 2022 09:24)    Culture - Urine (collected 06 Jun 2022 18:44)  Source: Clean Catch Clean Catch (Midstream)  Final Report (08 Jun 2022 16:57):    >100,000 CFU/ml Escherichia coli  Organism: Escherichia coli (08 Jun 2022 16:57)  Organism: Escherichia coli (08 Jun 2022 16:57)    < from: US Renal (06.08.22 @ 09:08) >    IMPRESSION:  Normal renal ultrasound. No hydronephrosis.    < end of copied text >    < from: CT Abdomen and Pelvis No Cont (06.06.22 @ 16:19) >    ACC: [id number] EXAM:  CT ABDOMEN AND PELVIS                          PROCEDURE DATE:  06/06/2022          INTERPRETATION:  CLINICAL INFORMATION: Renal failure.    COMPARISON: None.    CONTRAST/COMPLICATIONS:  IV Contrast: NONE  Oral Contrast: NONE  Complications: None reported at time of study completion    PROCEDURE:  CT of the Abdomen and Pelvis was performed.  Sagittal and coronal reformats were performed.    FINDINGS:  LOWER CHEST: Within normal limits.  LIVER: Within normal limits.  BILE DUCTS: Normal caliber.  GALLBLADDER: Not visualized, possibly surgically absent.  SPLEEN: Within normal limits.  PANCREAS: Within normal limits.  ADRENALS: Right adrenal adenoma, measuring 2.6 cm. Left adrenal   thickening.  KIDNEYS/URETERS: Mild right hydroureteronephrosis to the level of the   distal ureter without obstructing calculus.  BLADDER: Within normal limits.  REPRODUCTIVE ORGANS: Calcified and noncalcified uterine leiomyomas.  BOWEL: No bowel obstruction. Appendix is normal.  PERITONEUM: No ascites.  VESSELS: Atherosclerotic changes.  RETROPERITONEUM/LYMPH NODES: No lymphadenopathy.  ABDOMINAL WALL: Small fat-containing umbilical hernia. Small bilateral   fat-containing inguinal hernias.  BONES: Degenerative changes, greatest in the left hip.    IMPRESSION:  Mild right hydroureteronephrosis to the level of the distal ureter   without obstructing calculus, possibly due to passed stone.    --- End of Report ---    MILLER MORALES MD; Attending Radiologist  This document has been electronically signed. Jun 6 2022  4:37PM    < end of copied text >    < from: Xray Chest 1 View-PORTABLE IMMEDIATE (06.06.22 @ 15:25) >  FINDINGS/  IMPRESSION:  No consolidation or pleural effusion    Heart size cannot be accurately assessed in this projection, but appear   enlarged.    --- End of Report ---    < end of copied text >     [facility]  Division of Infectious Diseases  [phone]    Name: SANDHYA JOSÉ  Age: 76y  Gender: Female  MRN: [id number]    Interval History--  Asked to reassess patient for increase in WBC compared with yesterday. Notes reviewed. Patient is pleasantly confused.  With prompting, no she is in the hospital, the year is 2022, and the month is June.  Not sure why she is here.  We oriented.    Patient complains of cough at night.  She has no shortness of breath or chest pain.  Not denies any abdominal pain.  Denies any nausea or vomiting.  Denies any diarrhea.  Denies any dysuria.  She has no back pain or flank pain.  She denies fevers, chills, or rigors.    Past Medical History--  CVA (cerebrovascular accident)    Arthritis    HTN (hypertension)        For details regarding the patient's social history, family history, and other miscellaneous elements, please refer the initial infectious diseases consultation and/or the admitting history and physical examination for this admission.    Allergies    No Known Allergies    Intolerances        Medications--  Antibiotics:  cefTRIAXone   IVPB 2000 milliGRAM(s) IV Intermittent every 24 hours    Immunologic:    Other:  ALBUTerol    90 MICROgram(s) HFA Inhaler  amLODIPine   Tablet  ammonium lactate 12% Lotion  atorvastatin  heparin   Injectable  lactated ringers.  polyethylene glycol 3350      Review of Systems--  A 10-point review of systems was obtained.   Review of systems otherwise negative except as previously noted, though not clear how reliable she is.    Physical Examination--  Vital Signs: T(F): 98.2 (06-11-22 @ 05:07), Max: 98.3 (06-10-22 @ 23:33)  HR: 77 (06-11-22 @ 05:07)  BP: 142/86 (06-11-22 @ 05:07)  RR: 18 (06-11-22 @ 05:07)  SpO2: 95% (06-11-22 @ 05:07)  Wt(kg): --  General: Nontoxic-appearing Female in no acute distress.  HEENT: AT/NC.  Anicteric. Conjunctiva pink and moist. Oropharynx clear  Neck: Not rigid. No sense of mass.  Nodes: None palpable.  Lungs: Clear bilaterally without rales, wheezing or rhonchi  Heart: Regular rate and rhythm. No Murmur. No rub. No gallop. No palpable thrill.  Abdomen: Bowel sounds present and normoactive. Soft. Nondistended. Nontender. No sense of mass. No organomegaly.  Back: No spinal tenderness. No costovertebral angle tenderness.   Extremities: No cyanosis or clubbing. No edema.   Skin: Warm. Dry. Good turgor. chronic appearing rash/hyperpigmetation (e.g. arms, back).. PIV OK. No phlebitis evident. No vasculitic stigmata.  Psychiatric: Grossly appropriate affect and mood for situation.       Laboratory Studies--  CBC                        9.6    18.46 )-----------( 331      ( 11 Jun 2022 07:39 )             29.2     WBC Count: 18.46 K/uL (06-11-22 @ 07:39)  WBC Count: 17.86 K/uL (06-10-22 @ 10:28)  WBC Count: 15.75 K/uL (06-09-22 @ 07:11)  WBC Count: 16.52 K/uL (06-08-22 @ 07:01)  WBC Count: 22.89 K/uL (06-07-22 @ 00:58)  WBC Count: 21.18 K/uL (06-06-22 @ 15:07)      Chemistries  06-11    139  |  104  |  23  ----------------------------<  127<H>  3.6   |  27  |  1.54<H>    Creatinine Trend  1.54 mg/dL (06-11-22 @ 07:39)  1.69 mg/dL (06-10-22 @ 10:28)  1.67 mg/dL (06-09-22 @ 07:11)  1.89 mg/dL (06-08-22 @ 07:01)  2.28 mg/dL (06-07-22 @ 00:58)  2.47 mg/dL (06-06-22 @ 15:07)      Ca    8.8      11 Jun 2022 07:39  Phos  3.0     06-11  Mg     2.3     06-11        Culture Data    Culture - Blood (collected 06 Jun 2022 18:49)  Source: .Blood Blood-Peripheral  Gram Stain (09 Jun 2022 16:54):    Growth in aerobic and anaerobic bottles: Gram Negative Rods  Final Report (09 Jun 2022 16:54):    Growth in aerobic and anaerobic bottles: Escherichia coli See previous    culture [id number]    Culture - Blood (collected 06 Jun 2022 18:49)  Source: .Blood Blood-Peripheral  Gram Stain (09 Jun 2022 09:24):    Growth in anaerobic bottle:    Gram Negative Rods    Growth in aerobic bottle: Gram Negative Rods  Final Report (09 Jun 2022 09:24):    Growth in aerobic and anaerobic bottles: Escherichia coli    ***Blood Panel PCR results on this specimen are available    approximately 3 hours after the Gram stain result.***    Gram stain, PCR, and/or culture results may not always    correspond due to difference in methodologies.    ************************************************************    This PCR assay was performed by multiplex PCR. This    Assay tests for 66 bacterial and resistance gene targets.    Please refer to the [facility] test directory    at [email or web address] for details.  Organism: Blood Culture PCR  Escherichia coli (09 Jun 2022 09:24)  Organism: Escherichia coli (09 Jun 2022 09:24)  Organism: Blood Culture PCR (09 Jun 2022 09:24)    Culture - Urine (collected 06 Jun 2022 18:44)  Source: Clean Catch Clean Catch (Midstream)  Final Report (08 Jun 2022 16:57):    >100,000 CFU/ml Escherichia coli  Organism: Escherichia coli (08 Jun 2022 16:57)  Organism: Escherichia coli (08 Jun 2022 16:57)    < from: US Renal (06.08.22 @ 09:08) >    IMPRESSION:  Normal renal ultrasound. No hydronephrosis.    < end of copied text >    < from: CT Abdomen and Pelvis No Cont (06.06.22 @ 16:19) >    ACC: [id number] EXAM:  CT ABDOMEN AND PELVIS                          PROCEDURE DATE:  06/06/2022          INTERPRETATION:  CLINICAL INFORMATION: Renal failure.    COMPARISON: None.    CONTRAST/COMPLICATIONS:  IV Contrast: NONE  Oral Contrast: NONE  Complications: None reported at time of study completion    PROCEDURE:  CT of the Abdomen and Pelvis was performed.  Sagittal and coronal reformats were performed.    FINDINGS:  LOWER CHEST: Within normal limits.  LIVER: Within normal limits.  BILE DUCTS: Normal caliber.  GALLBLADDER: Not visualized, possibly surgically absent.  SPLEEN: Within normal limits.  PANCREAS: Within normal limits.  ADRENALS: Right adrenal adenoma, measuring 2.6 cm. Left adrenal   thickening.  KIDNEYS/URETERS: Mild right hydroureteronephrosis to the level of the   distal ureter without obstructing calculus.  BLADDER: Within normal limits.  REPRODUCTIVE ORGANS: Calcified and noncalcified uterine leiomyomas.  BOWEL: No bowel obstruction. Appendix is normal.  PERITONEUM: No ascites.  VESSELS: Atherosclerotic changes.  RETROPERITONEUM/LYMPH NODES: No lymphadenopathy.  ABDOMINAL WALL: Small fat-containing umbilical hernia. Small bilateral   fat-containing inguinal hernias.  BONES: Degenerative changes, greatest in the left hip.    IMPRESSION:  Mild right hydroureteronephrosis to the level of the distal ureter   without obstructing calculus, possibly due to passed stone.    --- End of Report ---    MILLER MORALES MD; Attending Radiologist  This document has been electronically signed. Jun 6 2022  4:37PM    < end of copied text >    < from: Xray Chest 1 View-PORTABLE IMMEDIATE (06.06.22 @ 15:25) >  FINDINGS/  IMPRESSION:  No consolidation or pleural effusion    Heart size cannot be accurately assessed in this projection, but appear   enlarged.    --- End of Report ---    < end of copied text >

## 2022-06-12 LAB
HCT VFR BLD CALC: 27.8 % — LOW (ref 34.5–45)
HGB BLD-MCNC: 9.1 G/DL — LOW (ref 11.5–15.5)
MCHC RBC-ENTMCNC: 26.8 PG — LOW (ref 27–34)
MCHC RBC-ENTMCNC: 32.7 G/DL — SIGNIFICANT CHANGE UP (ref 32–36)
MCV RBC AUTO: 82 FL — SIGNIFICANT CHANGE UP (ref 80–100)
NRBC # BLD: 0 /100 WBCS — SIGNIFICANT CHANGE UP (ref 0–0)
PLATELET # BLD AUTO: 345 K/UL — SIGNIFICANT CHANGE UP (ref 150–400)
RBC # BLD: 3.39 M/UL — LOW (ref 3.8–5.2)
RBC # FLD: 16.9 % — HIGH (ref 10.3–14.5)
WBC # BLD: 17.46 K/UL — HIGH (ref 3.8–10.5)
WBC # FLD AUTO: 17.46 K/UL — HIGH (ref 3.8–10.5)

## 2022-06-12 PROCEDURE — 71045 X-RAY EXAM CHEST 1 VIEW: CPT | Mod: 26

## 2022-06-12 PROCEDURE — 99233 SBSQ HOSP IP/OBS HIGH 50: CPT

## 2022-06-12 RX ADMIN — ALBUTEROL 1 PUFF(S): 90 AEROSOL, METERED ORAL at 10:11

## 2022-06-12 RX ADMIN — HEPARIN SODIUM 5000 UNIT(S): 5000 INJECTION INTRAVENOUS; SUBCUTANEOUS at 05:34

## 2022-06-12 RX ADMIN — ALBUTEROL 1 PUFF(S): 90 AEROSOL, METERED ORAL at 13:45

## 2022-06-12 RX ADMIN — ALBUTEROL 1 PUFF(S): 90 AEROSOL, METERED ORAL at 21:37

## 2022-06-12 RX ADMIN — ALBUTEROL 1 PUFF(S): 90 AEROSOL, METERED ORAL at 05:36

## 2022-06-12 RX ADMIN — ALBUTEROL 1 PUFF(S): 90 AEROSOL, METERED ORAL at 18:35

## 2022-06-12 RX ADMIN — HEPARIN SODIUM 5000 UNIT(S): 5000 INJECTION INTRAVENOUS; SUBCUTANEOUS at 18:03

## 2022-06-12 RX ADMIN — Medication 1 APPLICATION(S): at 18:34

## 2022-06-12 RX ADMIN — AMLODIPINE BESYLATE 10 MILLIGRAM(S): 2.5 TABLET ORAL at 05:34

## 2022-06-12 RX ADMIN — Medication 1 APPLICATION(S): at 05:35

## 2022-06-12 RX ADMIN — POLYETHYLENE GLYCOL 3350 17 GRAM(S): 17 POWDER, FOR SOLUTION ORAL at 12:38

## 2022-06-12 RX ADMIN — ATORVASTATIN CALCIUM 40 MILLIGRAM(S): 80 TABLET, FILM COATED ORAL at 21:37

## 2022-06-12 RX ADMIN — ALBUTEROL 1 PUFF(S): 90 AEROSOL, METERED ORAL at 01:11

## 2022-06-12 RX ADMIN — CEFTRIAXONE 100 MILLIGRAM(S): 500 INJECTION, POWDER, FOR SOLUTION INTRAMUSCULAR; INTRAVENOUS at 18:03

## 2022-06-12 NOTE — PROGRESS NOTE ADULT - PROBLEM SELECTOR PLAN 5
has some ckd dating back to 2015 at least ckd stage 2-3   emmy- that was present on admission is improving    has a ridley and has mild right hydroureternephorosis  -- repeat US ordered by urology    urology consult appreciated  6/9/2022 f/u renal US , creatinine continues to improve  6/10/2022 labs pending  6/11/2022 creatinine continued improvement , TOV prior to d/c and there has been resolution of right hydronephrosis  6/12/2022 continue improvement

## 2022-06-12 NOTE — PROGRESS NOTE ADULT - NSPROGADDITIONALINFOA_GEN_ALL_CORE
constipation- resolved    hypophosphatemia ; resolved    had a long in person conversation with daughter at bedside on 6/11/2022

## 2022-06-12 NOTE — PROGRESS NOTE ADULT - SUBJECTIVE AND OBJECTIVE BOX
Patient is a 76y old  Female who presents with a chief complaint of UTI, AMS (2022 11:19)      INTERVAL HPI/OVERNIGHT EVENTS:    MEDICATIONS  (STANDING):  ALBUTerol    90 MICROgram(s) HFA Inhaler 1 Puff(s) Inhalation every 4 hours  amLODIPine   Tablet 10 milliGRAM(s) Oral daily  ammonium lactate 12% Lotion 1 Application(s) Topical two times a day  atorvastatin 40 milliGRAM(s) Oral at bedtime  cefTRIAXone   IVPB 2000 milliGRAM(s) IV Intermittent every 24 hours  heparin   Injectable 5000 Unit(s) SubCutaneous every 12 hours  polyethylene glycol 3350 17 Gram(s) Oral daily    MEDICATIONS  (PRN):    Allergies    No Known Allergies    Vital Signs Last 24 Hrs  T(C): 36.9 (2022 12:39), Max: 37.6 (2022 05:10)  T(F): 98.4 (2022 12:39), Max: 99.6 (2022 05:10)  HR: 75 (2022 12:39) (75 - 89)  BP: 110/71 (2022 12:39) (110/57 - 124/78)  BP(mean): --  RR: 17 (2022 12:39) (17 - 18)  SpO2: 97% (2022 12:39) (95% - 99%)    PHYSICAL EXAM:  GENERAL: NAD  HEAD:  Atraumatic, Normocephalic  EYES: EOMI, PERRLA, conjunctiva and sclera clear  ENMT: No tonsillar erythema, exudates, or enlargement;   NECK: Supple, Normal thyroid  NERVOUS SYSTEM:  Alert & Oriented  Motor Strength 4/5 left lower extremities; DTRs 2+ intact and symmetric  CHEST/LUNG: mild scattered wheeze. no distress   HEART: Regular rate and rhythm; No murmurs, rubs, or gallops  ABDOMEN: Soft, Nontender, Nondistended; Bowel sounds present  EXTREMITIES:  2+ Peripheral Pulses, No clubbing, cyanosis, or edema    SKIN: No rashes or lesions    LABS:                                          9.1    17.46 )-----------( 345      ( 2022 08:39 )             27.8   06-11    139  |  104  |  23  ----------------------------<  127<H>  3.6   |  27  |  1.54<H>    Ca    8.8      2022 07:39  Phos  3.0     06-  Mg     2.3     -11                        Urinalysis Basic - ( 2022 14:43 )    Color: Yellow / Appearance: very cloudy / S.010 / pH: x  Gluc: x / Ketone: Negative  / Bili: Negative / Urobili: 1 mg/dL   Blood: x / Protein: 100 mg/dL / Nitrite: Negative   Leuk Esterase: Moderate / RBC: 6-10 /HPF / WBC >50   Sq Epi: x / Non Sq Epi: Few / Bacteria: Many      CAPILLARY BLOOD GLUCOSE      CAPILLARY BLOOD GLUCOSE      A1C with Estimated Average Glucose (22 @ 08:57)    A1C with Estimated Average Glucose Result: 6.4: Method: Immunoassay       Reference Range                4.0-5.6%       High risk (prediabetic)        5.7-6.4%       Diabetic, diagnostic             >=6.5%       ADA diabetic treatment goal       <7.0%  The Hemoglobin A1c testing is NGSP-certified.Reference ranges are based  upon the 2010 recommendations of  the American Diabetes Association.  Interpretation may vary for children  and adolescents. %    Estimated Average Glucose: 137: The Estimated Average Glucose (eAG) or Mean Plasma Glucose (MPG) value is  calculated from the hemoglobin A1c value and covers the same time period.   The American Diabetes Association (ADA) and other professional  organizations recommend reporting the eAG with the HgbA1c. mg/dL        RADIOLOGY & ADDITIONAL TESTS:    Imaging Personally Reviewed:  [ ] YES  [ ] NO    Consultant(s) Notes Reviewed:  [ ] YES  [ ] NO    Care Discussed with Consultants/Other Providers [ ] YES  [ ] NO

## 2022-06-13 ENCOUNTER — TRANSCRIPTION ENCOUNTER (OUTPATIENT)
Age: 77
End: 2022-06-13

## 2022-06-13 DIAGNOSIS — A41.89 OTHER SPECIFIED SEPSIS: ICD-10-CM

## 2022-06-13 LAB
ANION GAP SERPL CALC-SCNC: 8 MMOL/L — SIGNIFICANT CHANGE UP (ref 5–17)
BUN SERPL-MCNC: 21 MG/DL — SIGNIFICANT CHANGE UP (ref 7–23)
CALCIUM SERPL-MCNC: 8.6 MG/DL — SIGNIFICANT CHANGE UP (ref 8.5–10.1)
CHLORIDE SERPL-SCNC: 105 MMOL/L — SIGNIFICANT CHANGE UP (ref 96–108)
CO2 SERPL-SCNC: 26 MMOL/L — SIGNIFICANT CHANGE UP (ref 22–31)
CREAT SERPL-MCNC: 1.41 MG/DL — HIGH (ref 0.5–1.3)
EGFR: 38 ML/MIN/1.73M2 — LOW
GLUCOSE SERPL-MCNC: 164 MG/DL — HIGH (ref 70–99)
HCT VFR BLD CALC: 28.7 % — LOW (ref 34.5–45)
HGB BLD-MCNC: 9.3 G/DL — LOW (ref 11.5–15.5)
MAGNESIUM SERPL-MCNC: 2.1 MG/DL — SIGNIFICANT CHANGE UP (ref 1.6–2.6)
MCHC RBC-ENTMCNC: 26.5 PG — LOW (ref 27–34)
MCHC RBC-ENTMCNC: 32.4 G/DL — SIGNIFICANT CHANGE UP (ref 32–36)
MCV RBC AUTO: 81.8 FL — SIGNIFICANT CHANGE UP (ref 80–100)
NRBC # BLD: 0 /100 WBCS — SIGNIFICANT CHANGE UP (ref 0–0)
OB PNL STL: NEGATIVE — SIGNIFICANT CHANGE UP
PHOSPHATE SERPL-MCNC: 2.6 MG/DL — SIGNIFICANT CHANGE UP (ref 2.5–4.5)
PLATELET # BLD AUTO: 384 K/UL — SIGNIFICANT CHANGE UP (ref 150–400)
POTASSIUM SERPL-MCNC: 3.5 MMOL/L — SIGNIFICANT CHANGE UP (ref 3.5–5.3)
POTASSIUM SERPL-SCNC: 3.5 MMOL/L — SIGNIFICANT CHANGE UP (ref 3.5–5.3)
RBC # BLD: 3.51 M/UL — LOW (ref 3.8–5.2)
RBC # FLD: 17.2 % — HIGH (ref 10.3–14.5)
SODIUM SERPL-SCNC: 139 MMOL/L — SIGNIFICANT CHANGE UP (ref 135–145)
WBC # BLD: 15.56 K/UL — HIGH (ref 3.8–10.5)
WBC # FLD AUTO: 15.56 K/UL — HIGH (ref 3.8–10.5)

## 2022-06-13 PROCEDURE — 99232 SBSQ HOSP IP/OBS MODERATE 35: CPT

## 2022-06-13 RX ORDER — CEFPODOXIME PROXETIL 100 MG
1 TABLET ORAL
Qty: 16 | Refills: 0
Start: 2022-06-13 | End: 2022-06-20

## 2022-06-13 RX ORDER — AMLODIPINE BESYLATE 2.5 MG/1
5 TABLET ORAL DAILY
Refills: 0 | Status: DISCONTINUED | OUTPATIENT
Start: 2022-06-14 | End: 2022-06-14

## 2022-06-13 RX ORDER — AMLODIPINE BESYLATE 2.5 MG/1
1 TABLET ORAL
Qty: 30 | Refills: 0
Start: 2022-06-13

## 2022-06-13 RX ORDER — METOPROLOL TARTRATE 50 MG
1 TABLET ORAL
Qty: 0 | Refills: 0 | DISCHARGE

## 2022-06-13 RX ORDER — ATORVASTATIN CALCIUM 80 MG/1
1 TABLET, FILM COATED ORAL
Qty: 30 | Refills: 0
Start: 2022-06-13

## 2022-06-13 RX ORDER — AMLODIPINE BESYLATE 2.5 MG/1
1 TABLET ORAL
Qty: 0 | Refills: 0 | DISCHARGE

## 2022-06-13 RX ADMIN — ALBUTEROL 1 PUFF(S): 90 AEROSOL, METERED ORAL at 11:41

## 2022-06-13 RX ADMIN — POLYETHYLENE GLYCOL 3350 17 GRAM(S): 17 POWDER, FOR SOLUTION ORAL at 11:42

## 2022-06-13 RX ADMIN — ALBUTEROL 1 PUFF(S): 90 AEROSOL, METERED ORAL at 05:48

## 2022-06-13 RX ADMIN — ALBUTEROL 1 PUFF(S): 90 AEROSOL, METERED ORAL at 15:16

## 2022-06-13 RX ADMIN — HEPARIN SODIUM 5000 UNIT(S): 5000 INJECTION INTRAVENOUS; SUBCUTANEOUS at 05:48

## 2022-06-13 RX ADMIN — Medication 1 APPLICATION(S): at 18:11

## 2022-06-13 RX ADMIN — ALBUTEROL 1 PUFF(S): 90 AEROSOL, METERED ORAL at 17:18

## 2022-06-13 RX ADMIN — Medication 1 APPLICATION(S): at 05:47

## 2022-06-13 RX ADMIN — AMLODIPINE BESYLATE 10 MILLIGRAM(S): 2.5 TABLET ORAL at 05:47

## 2022-06-13 RX ADMIN — HEPARIN SODIUM 5000 UNIT(S): 5000 INJECTION INTRAVENOUS; SUBCUTANEOUS at 17:20

## 2022-06-13 RX ADMIN — ATORVASTATIN CALCIUM 40 MILLIGRAM(S): 80 TABLET, FILM COATED ORAL at 21:10

## 2022-06-13 RX ADMIN — CEFTRIAXONE 100 MILLIGRAM(S): 500 INJECTION, POWDER, FOR SOLUTION INTRAMUSCULAR; INTRAVENOUS at 18:11

## 2022-06-13 RX ADMIN — ALBUTEROL 1 PUFF(S): 90 AEROSOL, METERED ORAL at 21:11

## 2022-06-13 NOTE — PROGRESS NOTE ADULT - PROBLEM SELECTOR PLAN 6
will be replaced
will be replaced
amlodipine
will be replaced  6/11/2022 resolved
will be replaced
will be replaced  6/11/2022 resolved
will be replaced  6/11/2022 resolved

## 2022-06-13 NOTE — PROGRESS NOTE ADULT - SUBJECTIVE AND OBJECTIVE BOX
[facility]  Division of Infectious Diseases  [phone]    Name: SANDHYA JOSÉ  Age: 76y  Gender: Female  MRN: [id number]    Interval History--  patient seen and examined. keeps asking to go home. has no complaints and ROS is negative.     Past Medical History--  CVA (cerebrovascular accident)    Arthritis    HTN (hypertension)        For details regarding the patient's social history, family history, and other miscellaneous elements, please refer the initial infectious diseases consultation and/or the admitting history and physical examination for this admission.    Allergies    No Known Allergies    Intolerances        Medications--  Antibiotics:  cefTRIAXone   IVPB 2000 every 24 hours      MEDICATIONS  (STANDING):  ALBUTerol    90 MICROgram(s) HFA Inhaler 1 every 4 hours  atorvastatin 40 at bedtime  heparin   Injectable 5000 every 12 hours  polyethylene glycol 3350 17 daily      PRN      Physical Exam:  Vital Signs Last 24 Hrs  T(F): 99.3 (06-13-22 @ 16:54), Max: 99.3 (06-13-22 @ 16:54)  HR: 83 (06-13-22 @ 16:54)  BP: 110/71 (06-13-22 @ 16:54)  RR: 18 (06-13-22 @ 16:54)  SpO2: 98% (06-13-22 @ 16:54) (96% - 98%)  Wt(kg): --      General: Nontoxic-appearing Female in no acute distress.  HEENT: AT/NC.  Anicteric. Conjunctiva pink and moist. Oropharynx clear  Neck: Not rigid. No sense of mass.  Nodes: None palpable.  Lungs: Clear bilaterally without rales, wheezing or rhonchi  Heart: Regular rate and rhythm. No Murmur. No rub. No gallop. No palpable thrill.  Abdomen: Bowel sounds present and normoactive. Soft. Nondistended. Nontender. No sense of mass. No organomegaly.  Back: No spinal tenderness. No costovertebral angle tenderness.   Extremities: No cyanosis or clubbing. No edema.   Skin: Warm. Dry. Good turgor. PIV OK. No phlebitis evident. No vasculitic stigmata.  Psychiatric: Grossly appropriate affect and mood for situation.       Laboratory Studies--  CBC                        9.6    18.46 )-----------( 331      ( 11 Jun 2022 07:39 )             29.2     WBC Count: 18.46 K/uL (06-11-22 @ 07:39)  WBC Count: 17.86 K/uL (06-10-22 @ 10:28)  WBC Count: 15.75 K/uL (06-09-22 @ 07:11)  WBC Count: 16.52 K/uL (06-08-22 @ 07:01)  WBC Count: 22.89 K/uL (06-07-22 @ 00:58)  WBC Count: 21.18 K/uL (06-06-22 @ 15:07)      Chemistries  06-11    139  |  104  |  23  ----------------------------<  127<H>  3.6   |  27  |  1.54<H>    Creatinine Trend  1.54 mg/dL (06-11-22 @ 07:39)  1.69 mg/dL (06-10-22 @ 10:28)  1.67 mg/dL (06-09-22 @ 07:11)  1.89 mg/dL (06-08-22 @ 07:01)  2.28 mg/dL (06-07-22 @ 00:58)  2.47 mg/dL (06-06-22 @ 15:07)      Ca    8.8      11 Jun 2022 07:39  Phos  3.0     06-11  Mg     2.3     06-11        Culture Data    Culture - Blood (collected 06 Jun 2022 18:49)  Source: .Blood Blood-Peripheral  Gram Stain (09 Jun 2022 16:54):    Growth in aerobic and anaerobic bottles: Gram Negative Rods  Final Report (09 Jun 2022 16:54):    Growth in aerobic and anaerobic bottles: Escherichia coli See previous    culture [id number]    Culture - Blood (collected 06 Jun 2022 18:49)  Source: .Blood Blood-Peripheral  Gram Stain (09 Jun 2022 09:24):    Growth in anaerobic bottle:    Gram Negative Rods    Growth in aerobic bottle: Gram Negative Rods  Final Report (09 Jun 2022 09:24):    Growth in aerobic and anaerobic bottles: Escherichia coli    ***Blood Panel PCR results on this specimen are available    approximately 3 hours after the Gram stain result.***    Gram stain, PCR, and/or culture results may not always    correspond due to difference in methodologies.    ************************************************************    This PCR assay was performed by multiplex PCR. This    Assay tests for 66 bacterial and resistance gene targets.    Please refer to the [facility] test directory    at [email or web address] for details.  Organism: Blood Culture PCR  Escherichia coli (09 Jun 2022 09:24)  Organism: Escherichia coli (09 Jun 2022 09:24)  Organism: Blood Culture PCR (09 Jun 2022 09:24)    Culture - Urine (collected 06 Jun 2022 18:44)  Source: Clean Catch Clean Catch (Midstream)  Final Report (08 Jun 2022 16:57):    >100,000 CFU/ml Escherichia coli  Organism: Escherichia coli (08 Jun 2022 16:57)  Organism: Escherichia coli (08 Jun 2022 16:57)    < from: US Renal (06.08.22 @ 09:08) >    IMPRESSION:  Normal renal ultrasound. No hydronephrosis.    < end of copied text >    < from: CT Abdomen and Pelvis No Cont (06.06.22 @ 16:19) >    ACC: [id number] EXAM:  CT ABDOMEN AND PELVIS                          PROCEDURE DATE:  06/06/2022          INTERPRETATION:  CLINICAL INFORMATION: Renal failure.    COMPARISON: None.    CONTRAST/COMPLICATIONS:  IV Contrast: NONE  Oral Contrast: NONE  Complications: None reported at time of study completion    PROCEDURE:  CT of the Abdomen and Pelvis was performed.  Sagittal and coronal reformats were performed.    FINDINGS:  LOWER CHEST: Within normal limits.  LIVER: Within normal limits.  BILE DUCTS: Normal caliber.  GALLBLADDER: Not visualized, possibly surgically absent.  SPLEEN: Within normal limits.  PANCREAS: Within normal limits.  ADRENALS: Right adrenal adenoma, measuring 2.6 cm. Left adrenal   thickening.  KIDNEYS/URETERS: Mild right hydroureteronephrosis to the level of the   distal ureter without obstructing calculus.  BLADDER: Within normal limits.  REPRODUCTIVE ORGANS: Calcified and noncalcified uterine leiomyomas.  BOWEL: No bowel obstruction. Appendix is normal.  PERITONEUM: No ascites.  VESSELS: Atherosclerotic changes.  RETROPERITONEUM/LYMPH NODES: No lymphadenopathy.  ABDOMINAL WALL: Small fat-containing umbilical hernia. Small bilateral   fat-containing inguinal hernias.  BONES: Degenerative changes, greatest in the left hip.    IMPRESSION:  Mild right hydroureteronephrosis to the level of the distal ureter   without obstructing calculus, possibly due to passed stone.    --- End of Report ---    MILLER MORALES MD; Attending Radiologist  This document has been electronically signed. Jun 6 2022  4:37PM    < end of copied text >    < from: Xray Chest 1 View-PORTABLE IMMEDIATE (06.06.22 @ 15:25) >  FINDINGS/  IMPRESSION:  No consolidation or pleural effusion    Heart size cannot be accurately assessed in this projection, but appear   enlarged.    --- End of Report ---    < end of copied text >

## 2022-06-13 NOTE — DISCHARGE NOTE PROVIDER - NSDCFUADDAPPT_GEN_ALL_CORE_FT
It is important to see your primary physician as well as the physicians noted below within the next week to perform a comprehensive medical review.  Call their offices for an appointment as soon as you leave the hospital.  You will also need to see them for renewal of your medications.  If you do not have a primary physician, contact the [facility] Physician Referral Service at [phone].  To obtain your results, you can access the [facility] Patient Portal at [email or web address].  Your medical issues appear to be stable at this time, but if your symptoms recur or worsen, contact your physicians and/or return to the hospital if necessary.  If you encounter any issues or questions with your medication, call your physicians before stopping the medication.

## 2022-06-13 NOTE — PROGRESS NOTE ADULT - ASSESSMENT
76F with a PMH of CVA with R sided deficits, OA, HTN, HLD who presents to the ED for AMS.    Found have high fever  leukocytosis with left shift  GISEL   ecoli in urine and in blood  blood cultures sensitivities to ceftriaxone     6/9: ceftriaxone covering, creatinine and wbc getting better , no objection to discharge to rehab  6/10: continue ceftriaxone, discharge per medicine, monitor wbc, creatinine close to baseline, TOV, anemia workup ongoing per medicine  6/11: White blood cell count in approximately the same range.  All data reviewed.  All prior white blood cell counts that I can find prior to admission have been in the normal range, making a myeloproliferative process unlikely.  Her platelet count here is normal, speaking against uncontrolled infection, but also speaking against reactive leukocytosis as well.  She has complaint of cough, but she has been afebrile, she has no chest pain, is comfortable on room air, and her lung exam is clear.  Her abdominal exam is benign.  There is no CVA tenderness to suggest pyelonephritis.  She has no evidence of IV catheter related phlebitis.  She was cleared by urology and passed her trial of void.  However with effective antibiotics I would have hoped that her white blood cell count would have moderated more.  If the white blood cell count fails to improve, she may be repeat abdominal imaging, as the initial CT scans were without IV or oral contrast.  Urine and blood isolates are both E. coli, but the sensitivity profile is minimally different (e.g. Cipro resistance in 1, gentamicin BLANCHE higher than the other specimen).  However urosepsis here is still the most likely diagnosis.  Patient is hemoglobin is falling slightly, but doubt retroperitoneal hematoma etc.    Suggestions  repeat blood cultures negative  chest x-ray with no new changes   continue ceftriaxone   change to PO vantin for 10 days from negative blood xc  Trend CBC    Reviewed with Dr. Matthew Delong, DO  Infectious Disease Attending  Reachable via Microsoft Teams or ID office: [phone]  After 5pm/weekends please call [phone] for all inquiries and new consults

## 2022-06-13 NOTE — PROGRESS NOTE ADULT - PROBLEM SELECTOR PLAN 2
on Rocephin   await sensitivities   wbc is responding and afebrile  6/9/2022 appreciate Id consult .   wbc downtrending almost normalized
on Rocephin   await sensitivities   wbc is responding and afebrile  6/9/2022 appreciate Id consult .   wbc downtrending almost normalized  6/11/2022 wbc have increased over past 2 days . unclear why d/w ID and recommendations noted , continue with current antibx as outlined by ID  6/12/2022 wbc downtrending again, cxr reviewed by me await final read but noting acute , afebrile times 24 hours, repeat cultures  await results, continue with antibx .  6/13/2022 wbc down to 15K will d/w ID regarding antibx. repeat cxr is negative and repeat blood cx ngtd
on Rocephin   await sensitivities   wbc is responding and afebrile  6/9/2022 appreciate Id consult .   wbc downtrending almost normalized
Started on ceftriaxone .  Continue for now  ecoli groiwing on blood cx.  pt eating drinking well. non-toxic appearing   if BP drops consider changing to meropenem for esbl coverage.     Found to have mild hydronephrosis - urology consulted in ED and will place folwy and repeat sono tomorrow
on Rocephin   await sensitivities   wbc is responding and afebrile  6/9/2022 appreciate Id consult .   wbc downtrending almost normalized  6/11/2022 wbc have increased over past 2 days . unclear why d/w ID and recommendations noted , continue with current antibx as outlined by ID
on Rocephin   await sensitivities   wbc is responding and afebrile  6/9/2022 appreciate Id consult .   wbc downtrending almost normalized  6/11/2022 wbc have increased over past 2 days . unclear why d/w ID and recommendations noted , continue with current antibx as outlined by ID  6/12/2022 wbc downtrending again, cxr reviewed by me await final read but noting acute , afebrile times 24 hours, repeat cultures  await results, continue with antibx .
on Rocephin   await sensitivities   wbc is responding and afebrile

## 2022-06-13 NOTE — PROGRESS NOTE ADULT - PROBLEM SELECTOR PROBLEM 3
UTI (urinary tract infection)
Lactic acidosis
UTI (urinary tract infection)

## 2022-06-13 NOTE — DISCHARGE NOTE PROVIDER - HOSPITAL COURSE
76 yoF with a PMHx of CVA with R sided deficits, OA, HTN, HLD who presents to [facility] ED on 6/6 for AMS.  Patient stated that she was returning from the bathroom that morning when she suddenly felt dizzy and fell onto her back.  Patient was found on the floor by family and required assistance to get back on her feet.  Family noted increased confusion after the fall and called EMS.  Patient's family was present at the bedside and stated that she was at her baseline mental status.  Patient stated that she had some dysuria for about a week but denies history of fever, chills, nausea, vomiting, chest pain, palpitations, cough, or dyspnea.  In the ED she was febrile to 103.8, labs significant for leukocytosis with left shift, lactic acidosis, elevated creatinine, + U/A.  X-rays negative for acute fractures. CT Abdomen/ pelvis with  right hydroureteronephrosis, urology consulted, ridley placed. Patient was started on ceftriaxone and admitted to medicine for sepsis, UTI and metabolic encephalopathy. Blood and urine cultures both with E.Coli sensitive to ceftriaxone, ceftriaxone continued, ID consulted. WBC trending down  and patient is afebrile since 6/6. Lactic acidosis resolved. Cr improved (patient does have hx of underlying CKD 2-3 from 2015) renal US from 6/8 showed resolution of hydronephrosis. Ridley removed and patient passed TOV 6/10. Labs significant for microcytic anemia, no signs of active bleeding, hemodynamically stable, occult stool negative x1, iron studies consistent with anemia of chronic disease, patient was advised to follow up with pmd for further testing. Repeat blood cultures NGTD. Encephalopathy resolved, patient as baseline. Patient is to continue Vantin 200mg bid for 8 days and follow up with pmd.         You have been noted to have Pre-diabetes. While you do not have full blown diabetes and do not need to be on insulin at this time, it is very important that you take necessary steps to prevent developing diabetes! This includes following a healthy lifestyle: eating fresh fruits and vegetables, avoiding processed, fatty, sugary foods and concentrated sweets like candies and baked goods on a regular basis. Becoming as active as possible, at least 150min per week of physical activity if your body can handle that. Be sure to follow up and discuss other methods and have your HgbA1c checked by your primary care doctor to ensure that you are getting a better handle on your blood sugars.        Problem/Plan - 1:  ·  Problem: Metabolic encephalopathy.   ·  Plan: seems to be resolved.           Problem/Plan - 7:  ·  Problem: Essential hypertension.   ·  Plan: amlodipine   bp is stable.     Problem/Plan - 8:  ·  Problem: Diabetes mellitus.   ·  Plan: pre diabetic a1c at 6.4.     Problem/Plan - 9:  ·  Problem: Hyperlipidemia.   ·  Plan: lipitor.     76 yoF with a PMHx of CVA with R sided deficits, OA, HTN, HLD who presents to [facility] ED on 6/6 for AMS.  Patient stated that she was returning from the bathroom that morning when she suddenly felt dizzy and fell onto her back.  Patient was found on the floor by family and required assistance to get back on her feet.  Family noted increased confusion after the fall and called EMS.  Patient's family was present at the bedside and stated that she was at her baseline mental status.  Patient stated that she had some dysuria for about a week but denies history of fever, chills, nausea, vomiting, chest pain, palpitations, cough, or dyspnea.  In the ED she was febrile to 103.8, labs significant for leukocytosis with left shift, lactic acidosis, elevated creatinine, + U/A.  X-rays negative for acute fractures. CT Abdomen/ pelvis with  right hydroureteronephrosis, urology consulted, ridley placed. Patient was started on ceftriaxone and admitted to medicine for sepsis, UTI and metabolic encephalopathy. Blood and urine cultures both with E.Coli sensitive to ceftriaxone, ceftriaxone continued, ID consulted. WBC trending down  and patient is afebrile since 6/6. Lactic acidosis resolved. Cr improved (patient does have hx of underlying CKD 2-3 from 2015) renal US from 6/8 showed resolution of hydronephrosis. Ridely removed and patient passed TOV 6/10. Labs significant for microcytic anemia, no signs of active bleeding, hemodynamically stable, occult stool negative x1, iron studies consistent with anemia of chronic disease, patient was advised to follow up with pmd for further testing. Repeat blood cultures NGTD. Encephalopathy resolved, patient as baseline. Patient is to continue Vantin 200mg bid for 8 days and follow up with pmd.          76 yoF with a PMHx of CVA with R sided deficits, OA, HTN, HLD who presents to [facility] ED on 6/6 for AMS.  Patient stated that she was returning from the bathroom that morning when she suddenly felt dizzy and fell onto her back.  Patient was found on the floor by family and required assistance to get back on her feet.  Family noted increased confusion after the fall and called EMS.  Patient's family was present at the bedside and stated that she was at her baseline mental status.  Patient stated that she had some dysuria for about a week but denies history of fever, chills, nausea, vomiting, chest pain, palpitations, cough, or dyspnea.  In the ED she was febrile to 103.8, labs significant for leukocytosis with left shift, lactic acidosis, elevated creatinine, + U/A.  X-rays negative for acute fractures. CT Abdomen/ pelvis with  right hydroureteronephrosis, urology consulted, ridley placed. Patient was started on ceftriaxone and admitted to medicine for sepsis, UTI and metabolic encephalopathy. Blood and urine cultures both with E.Coli sensitive to ceftriaxone, ceftriaxone continued, ID consulted. WBC trending down  and patient is afebrile since 6/6. Lactic acidosis resolved. Cr improved (patient does have hx of underlying CKD 2-3 from 2015) renal US from 6/8 showed resolution of hydronephrosis. Ridley removed and patient passed TOV 6/10. Labs significant for microcytic anemia, no signs of active bleeding, hemodynamically stable, occult stool negative x1, iron studies consistent with anemia of chronic disease, patient was advised to follow up with pmd for further testing stool occult blood is negative. Repeat blood cultures NGTD. Encephalopathy resolved, patient as baseline. Patient is to continue Vantin 200mg bid for 8 days and follow up with pmd.

## 2022-06-13 NOTE — PROGRESS NOTE ADULT - PROVIDER SPECIALTY LIST ADULT
Infectious Disease
Urology
Infectious Disease
Infectious Disease
Urology
Infectious Disease
Urology
Urology
Hospitalist

## 2022-06-13 NOTE — DIETITIAN INITIAL EVALUATION ADULT - PERTINENT LABORATORY DATA
06-13    139  |  105  |  21  ----------------------------<  164<H>  3.5   |  26  |  1.41<H>    Ca    8.6      13 Jun 2022 10:46  Phos  2.6     06-13  Mg     2.1     06-13    A1C with Estimated Average Glucose Result: 6.4 %, 137 (06-07-22)  No fingersticks taken

## 2022-06-13 NOTE — PROGRESS NOTE ADULT - PROBLEM SELECTOR PLAN 12
review of HIE shows relatively normal hgb labs in 12/2021    will check iron studies and occult blood  6/10/2022 iron studies noted c/w anemia of chronic disease . await occult blood and ferritin  6/11/2022 occult and ferritin still pending
review of HIE shows relatively normal hgb labs in 12/2021    will check iron studies and occult blood  6/10/2022 iron studies noted c/w anemia of chronic disease . await occult blood and ferritin
review of HIE shows relatively normal hgb labs in 12/2021    will check iron studies and occult blood  6/10/2022 iron studies noted c/w anemia of chronic disease . await occult blood and ferritin  6/11/2022 occult and ferritin still pending  6/12/2022 occult still pending, iron studies appears c/w chronic disease ..  6/13/2022 occult blood negative times one. advised  for  outpt f/u with pcp . colonoscopy if not performed  and /or hematology consult for further w/u . hemodynamically stable w/o s/ active bleeding .
review of HIE shows relatively normal hgb labs in 12/2021    will check iron studies and occult blood
review of HIE shows relatively normal hgb labs in 12/2021    will check iron studies and occult blood  6/10/2022 iron studies noted c/w anemia of chronic disease . await occult blood and ferritin  6/11/2022 occult and ferritin still pending  6/12/2022 occult still pending, iron studies appears c/w chronic disease ..
review of HIE shows relatively normal hgb labs in 12/2021    will check iron studies and occult blood

## 2022-06-13 NOTE — PROGRESS NOTE ADULT - PROBLEM SELECTOR PROBLEM 8
Diabetes mellitus
Diabetes mellitus
Hyperlipidemia
Diabetes mellitus

## 2022-06-13 NOTE — PROGRESS NOTE ADULT - PROBLEM SELECTOR PLAN 5
has some ckd dating back to 2015 at least ckd stage 2-3   emmy- that was present on admission is improving    has a ridley and has mild right hydroureternephorosis  -- repeat US ordered by urology    urology consult appreciated  6/9/2022 f/u renal US , creatinine continues to improve  6/10/2022 labs pending  6/11/2022 creatinine continued improvement , TOV prior to d/c and there has been resolution of right hydronephrosis  6/12/2022 continue improvement  6/13/2022 continued improvement

## 2022-06-13 NOTE — PROGRESS NOTE ADULT - REASON FOR ADMISSION
UTI, AMS

## 2022-06-13 NOTE — PROGRESS NOTE ADULT - PROBLEM SELECTOR PROBLEM 5
GISEL (acute kidney injury)
Hypokalemia
GISEL (acute kidney injury)

## 2022-06-13 NOTE — PROGRESS NOTE ADULT - PROBLEM SELECTOR PROBLEM 12
Microcytic anemia
no deformity, pain or tenderness. no restriction of movement

## 2022-06-13 NOTE — DISCHARGE NOTE PROVIDER - NSDCMRMEDTOKEN_GEN_ALL_CORE_FT
amLODIPine 5 mg oral tablet: 1 tab(s) orally once a day  atorvastatin 40 mg oral tablet: 1 tab(s) orally once a day (at bedtime)  cefpodoxime 200 mg oral tablet: 1 tab(s) orally 2 times a day    amLODIPine 10 mg oral tablet: 1 tab(s) orally once a day   atorvastatin 40 mg oral tablet: 1 tab(s) orally once a day (at bedtime)  cefpodoxime 200 mg oral tablet: 1 tab(s) orally 2 times a day    amLODIPine 10 mg oral tablet: 1 tab(s) orally once a day   Aspirin Enteric Coated 81 mg oral delayed release tablet: 1 tab(s) orally once a day   atorvastatin 40 mg oral tablet: 1 tab(s) orally once a day (at bedtime)  cefpodoxime 200 mg oral tablet: 1 tab(s) orally 2 times a day

## 2022-06-13 NOTE — DIETITIAN INITIAL EVALUATION ADULT - OTHER CALCULATIONS
Ht (cm):  175.3  Wt (kg):   105.1 (6/13)  BMI:   34.3  IBW:   65.9 kg  %IBW: 159%  UBW:  stable  %UBW: 100%

## 2022-06-13 NOTE — PROGRESS NOTE ADULT - PROBLEM SELECTOR PROBLEM 9
Hyperlipidemia
Preventive measure

## 2022-06-13 NOTE — PROGRESS NOTE ADULT - PROBLEM SELECTOR PROBLEM 4
Lactic acidosis
Lactic acidosis
GISEL (acute kidney injury)
Lactic acidosis

## 2022-06-13 NOTE — PROGRESS NOTE ADULT - PROBLEM SELECTOR PLAN 8
pre diabetic a1c at 6.4
lipitor
pre diabetic a1c at 6.4
pre diabetic a1c at 6.4

## 2022-06-13 NOTE — PROGRESS NOTE ADULT - PROBLEM SELECTOR PLAN 10
VTE prop: heparin sc q12 hrs
mild wheeze   denies asthma copd  trial of duoneb   cxr clear and no cough

## 2022-06-13 NOTE — PROGRESS NOTE ADULT - PROBLEM/PLAN-5
DISPLAY PLAN FREE TEXT
SOB
SOB
dyspnea
shortness of breath and hypertension
DISPLAY PLAN FREE TEXT
pl effusion
DISPLAY PLAN FREE TEXT

## 2022-06-13 NOTE — PROGRESS NOTE ADULT - PROBLEM SELECTOR PLAN 1
seems to be resolved
suspect baseline dementia. will discuss with daughter     PT reji
seems to be resolved

## 2022-06-13 NOTE — PROGRESS NOTE ADULT - PROBLEM SELECTOR PROBLEM 7
Diabetes mellitus
Essential hypertension

## 2022-06-13 NOTE — DISCHARGE NOTE PROVIDER - NSDCCPCAREPLAN_GEN_ALL_CORE_FT
PRINCIPAL DISCHARGE DIAGNOSIS  Diagnosis: Urinary tract infection  Assessment and Plan of Treatment: You have completed a course of ceftriaxone IV while admitted to hospital. Continue Vantin 200mg two times a day for 8 days upon discharge and follow up with your primary care provider.      SECONDARY DISCHARGE DIAGNOSES  Diagnosis: Other specified sepsis  Assessment and Plan of Treatment:      PRINCIPAL DISCHARGE DIAGNOSIS  Diagnosis: Urinary tract infection  Assessment and Plan of Treatment: You have completed a course of ceftriaxone IV while admitted to hospital. Continue Vantin 200mg two times a day for 8 days upon discharge and follow up with your primary care provider.      SECONDARY DISCHARGE DIAGNOSES  Diagnosis: Essential hypertension  Assessment and Plan of Treatment: You have been diagnosed with high blood pressure (also called hypertension). This means the force of blood against your artery walls is too strong. It also means your heart is working hard to move blood. High blood pressure usually has no symptoms, but over time, it can damage your heart, blood vessels, eyes, kidneys, and other organs. With help from your doctor, you can manage your blood pressure and protect your health.   Take your blood pressure medicine exactly as directed. Don't skip doses. Missing doses can cause your blood pressure to get out of control. If you do miss a dose (or doses) check with your healthcare provider about what to do. Avoid medicine that contain heart stimulants, including over-the-counter drugs. Check for warnings about high blood pressure on the label. Ask the pharmacist before purchasing something you haven't used before. Check with your doctor or pharmacist before taking a decongestant. Some decongestants can worsen high blood pressure.    Diagnosis: Diabetes mellitus  Assessment and Plan of Treatment: You have been noted to have Pre-diabetes, your HbA1c was 6.4.. While you do not have full blown diabetes and do not need to be on insulin at this time, it is very important that you take necessary steps to prevent developing diabetes. This includes following a healthy lifestyle: eating fresh fruits and vegetables, avoiding processed, fatty, sugary foods and concentrated sweets like candies and baked goods on a regular basis. Becoming as active as possible, at least 150min per week of physical activity if your body can handle that. Be sure to follow up and discuss other methods and have your HgbA1c rechecked by your primary care doctor to ensure that you are getting a better handle on your blood sugars.    Diagnosis: Hyperlipidemia  Assessment and Plan of Treatment: Hyperlipidemia is a high level of lipids (fats) in your blood. These lipids include cholesterol or triglycerides. Lipids are made by your body. They also come from the foods you eat. Your body needs lipids to work properly, but high levels increase your risk for heart disease, heart attack, and stroke.  To help lower your cholesterol, please continue to take your atorvostatin as prescribed. Exercise lowers your cholesterol levels and helps you maintain a healthy weight. Do not smoke. Nicotine and other chemicals in cigarettes and cigars can increase your risk for a heart attack and stroke. Eat heart-healthy foods and decrease the total amount of fat you eat. Choose lean meats, fat-free or 1% fat milk, and low-fat dairy products, such as yogurt and cheese. Limit or do not eat red meat. Red meats are high in fat and cholesterol.    Diagnosis: Other specified sepsis  Assessment and Plan of Treatment:      PRINCIPAL DISCHARGE DIAGNOSIS  Diagnosis: Sepsis due to Escherichia coli  Assessment and Plan of Treatment: You have completed a course of ceftriaxone IV while admitted to hospital. Continue Vantin 200mg two times a day for 8 days upon discharge and follow up with your primary care provider.      SECONDARY DISCHARGE DIAGNOSES  Diagnosis: Essential hypertension  Assessment and Plan of Treatment: You have been diagnosed with high blood pressure (also called hypertension). This means the force of blood against your artery walls is too strong. It also means your heart is working hard to move blood. High blood pressure usually has no symptoms, but over time, it can damage your heart, blood vessels, eyes, kidneys, and other organs. With help from your doctor, you can manage your blood pressure and protect your health.   Take your blood pressure medicine exactly as directed. Don't skip doses. Missing doses can cause your blood pressure to get out of control. If you do miss a dose (or doses) check with your healthcare provider about what to do. Avoid medicine that contain heart stimulants, including over-the-counter drugs. Check for warnings about high blood pressure on the label. Ask the pharmacist before purchasing something you haven't used before. Check with your doctor or pharmacist before taking a decongestant. Some decongestants can worsen high blood pressure.    Diagnosis: Diabetes mellitus  Assessment and Plan of Treatment: You have been noted to have Pre-diabetes, your HbA1c was 6.4.. While you do not have full blown diabetes and do not need to be on insulin at this time, it is very important that you take necessary steps to prevent developing diabetes. This includes following a healthy lifestyle: eating fresh fruits and vegetables, avoiding processed, fatty, sugary foods and concentrated sweets like candies and baked goods on a regular basis. Becoming as active as possible, at least 150min per week of physical activity if your body can handle that. Be sure to follow up and discuss other methods and have your HgbA1c rechecked by your primary care doctor to ensure that you are getting a better handle on your blood sugars.    Diagnosis: Hyperlipidemia  Assessment and Plan of Treatment: Hyperlipidemia is a high level of lipids (fats) in your blood. These lipids include cholesterol or triglycerides. Lipids are made by your body. They also come from the foods you eat. Your body needs lipids to work properly, but high levels increase your risk for heart disease, heart attack, and stroke.  To help lower your cholesterol, please continue to take your atorvostatin as prescribed. Exercise lowers your cholesterol levels and helps you maintain a healthy weight. Do not smoke. Nicotine and other chemicals in cigarettes and cigars can increase your risk for a heart attack and stroke. Eat heart-healthy foods and decrease the total amount of fat you eat. Choose lean meats, fat-free or 1% fat milk, and low-fat dairy products, such as yogurt and cheese. Limit or do not eat red meat. Red meats are high in fat and cholesterol.    Diagnosis: Other specified sepsis  Assessment and Plan of Treatment: You have completed a course of ceftriaxone IV while admitted to hospital. Continue Vantin 200mg two times a day for 8 days upon discharge and follow up with your primary care provider.    Diagnosis: GISEL (acute kidney injury)  Assessment and Plan of Treatment: improving    Diagnosis: Microcytic anemia  Assessment and Plan of Treatment: f/u with [facility] primary care doctor and/ or hematologist    occult blood negative tiems one    if have not ahd colonsocopy then also need to see GI doctor    Diagnosis: Metabolic encephalopathy  Assessment and Plan of Treatment: resolved    Diagnosis: Stage 2 chronic kidney disease  Assessment and Plan of Treatment:      PRINCIPAL DISCHARGE DIAGNOSIS  Diagnosis: Sepsis due to Escherichia coli  Assessment and Plan of Treatment: You have completed a course of ceftriaxone IV while admitted to hospital. Continue Vantin 200mg two times a day for 8 days upon discharge and follow up with your primary care provider.      SECONDARY DISCHARGE DIAGNOSES  Diagnosis: Essential hypertension  Assessment and Plan of Treatment: You have been diagnosed with high blood pressure (also called hypertension). This means the force of blood against your artery walls is too strong. It also means your heart is working hard to move blood. High blood pressure usually has no symptoms, but over time, it can damage your heart, blood vessels, eyes, kidneys, and other organs. With help from your doctor, you can manage your blood pressure and protect your health.   Take your blood pressure medicine exactly as directed. Don't skip doses. Missing doses can cause your blood pressure to get out of control. If you do miss a dose (or doses) check with your healthcare provider about what to do. Avoid medicine that contain heart stimulants, including over-the-counter drugs. Check for warnings about high blood pressure on the label. Ask the pharmacist before purchasing something you haven't used before. Check with your doctor or pharmacist before taking a decongestant. Some decongestants can worsen high blood pressure.    Diagnosis: Diabetes mellitus  Assessment and Plan of Treatment: You have been noted to have Pre-diabetes, your HbA1c was 6.4.. While you do not have full blown diabetes and do not need to be on insulin at this time, it is very important that you take necessary steps to prevent developing diabetes. This includes following a healthy lifestyle: eating fresh fruits and vegetables, avoiding processed, fatty, sugary foods and concentrated sweets like candies and baked goods on a regular basis. Becoming as active as possible, at least 150min per week of physical activity if your body can handle that. Be sure to follow up and discuss other methods and have your HgbA1c rechecked by your primary care doctor to ensure that you are getting a better handle on your blood sugars.    Diagnosis: Hyperlipidemia  Assessment and Plan of Treatment: Hyperlipidemia is a high level of lipids (fats) in your blood. These lipids include cholesterol or triglycerides. Lipids are made by your body. They also come from the foods you eat. Your body needs lipids to work properly, but high levels increase your risk for heart disease, heart attack, and stroke.  To help lower your cholesterol, please continue to take your atorvostatin as prescribed. Exercise lowers your cholesterol levels and helps you maintain a healthy weight. Do not smoke. Nicotine and other chemicals in cigarettes and cigars can increase your risk for a heart attack and stroke. Eat heart-healthy foods and decrease the total amount of fat you eat. Choose lean meats, fat-free or 1% fat milk, and low-fat dairy products, such as yogurt and cheese. Limit or do not eat red meat. Red meats are high in fat and cholesterol.    Diagnosis: Other specified sepsis  Assessment and Plan of Treatment: You have completed a course of ceftriaxone IV while admitted to hospital. Continue Vantin 200mg two times a day for 8 days upon discharge and follow up with your primary care provider.    Diagnosis: GISEL (acute kidney injury)  Assessment and Plan of Treatment: improving    Diagnosis: Microcytic anemia  Assessment and Plan of Treatment: f/u with [facility] primary care doctor and/ or hematologist    occult blood negative tiems one    if have not ahd colonsocopy then also need to see GI doctor    Diagnosis: Metabolic encephalopathy  Assessment and Plan of Treatment: resolved    Diagnosis: Stage 2 chronic kidney disease  Assessment and Plan of Treatment:     Diagnosis: H/O: CVA (cerebrovascular accident)  Assessment and Plan of Treatment:

## 2022-06-13 NOTE — PROGRESS NOTE ADULT - PROBLEM SELECTOR PLAN 7
Glucose 192 on bmp  will send a1c
amlodipine   bp is stable

## 2022-06-13 NOTE — DIETITIAN INITIAL EVALUATION ADULT - OTHER INFO
Pt lives c spouse; is independent c ADL; also has supportive daughter. Pt reports good appetite; denies any N/V/D or chew/swallowing difficulty; c constipation; RN provided stool softener to assists. Pt reports wt has been stable.  Despite hx of DM reflected in medical record pt reports she does not have DM; no medication reflected in H & P & pt reports none taken.  Discussed slightly elevated A1c & importance of f/u c MD after d/c to monitor BG levels, Reviewed CHO foods & beverages; importance of CHO portion control.  Pt & spouse receptive to education; educational material provided.

## 2022-06-13 NOTE — PROGRESS NOTE ADULT - PROBLEM SELECTOR PROBLEM 10
Preventive measure
Wheeze
Preventive measure
Preventive measure

## 2022-06-13 NOTE — DISCHARGE NOTE PROVIDER - ATTENDING DISCHARGE PHYSICAL EXAMINATION:
Vital Signs Last 24 Hrs  T(C): 36.7 (14 Jun 2022 11:31), Max: 37.4 (13 Jun 2022 16:54)  T(F): 98 (14 Jun 2022 11:31), Max: 99.3 (13 Jun 2022 16:54)  HR: 76 (14 Jun 2022 11:31) (75 - 83)  BP: 153/77 (14 Jun 2022 11:31) (110/71 - 153/77)  BP(mean): --  RR: 16 (14 Jun 2022 11:31) (16 - 18)  SpO2: 97% (14 Jun 2022 11:31) (97% - 98%) Vital Signs Last 24 Hrs  T(C): 36.7 (14 Jun 2022 11:31), Max: 37.4 (13 Jun 2022 16:54)  T(F): 98 (14 Jun 2022 11:31), Max: 99.3 (13 Jun 2022 16:54)  HR: 76 (14 Jun 2022 11:31) (75 - 83)  BP: 153/77 (14 Jun 2022 11:31) (110/71 - 153/77)  BP(mean): --  RR: 16 (14 Jun 2022 11:31) (16 - 18)  SpO2: 97% (14 Jun 2022 11:31) (97% - 98%)      GENERAL: NAD  HEAD:  Atraumatic, Normocephalic  EYES: EOMI, PERRLA, conjunctiva and sclera clear  ENMT: No tonsillar erythema, exudates, or enlargement;   NECK: Supple, Normal thyroid  NERVOUS SYSTEM:  Alert & Oriented  Motor Strength 4/5 left lower extremities; DTRs 2+ intact and symmetric  CHEST/LUNG: b/l cta distress   HEART: Regular rate and rhythm; No murmurs, rubs, or gallops  ABDOMEN: Soft, Nontender, Nondistended; Bowel sounds present  EXTREMITIES:  2+ Peripheral Pulses, No clubbing, cyanosis, or edema    SKIN: No rashes or lesions

## 2022-06-13 NOTE — PROGRESS NOTE ADULT - SUBJECTIVE AND OBJECTIVE BOX
Patient is a 76y old  Female who presents with a chief complaint of UTI, AMS (2022 11:19)      INTERVAL HPI/OVERNIGHT EVENTS:  none      MEDICATIONS  (STANDING):  ALBUTerol    90 MICROgram(s) HFA Inhaler 1 Puff(s) Inhalation every 4 hours  ammonium lactate 12% Lotion 1 Application(s) Topical two times a day  atorvastatin 40 milliGRAM(s) Oral at bedtime  cefTRIAXone   IVPB 2000 milliGRAM(s) IV Intermittent every 24 hours  heparin   Injectable 5000 Unit(s) SubCutaneous every 12 hours  polyethylene glycol 3350 17 Gram(s) Oral daily    MEDICATIONS  (PRN):    Allergies    No Known Allergies    Vital Signs Last 24 Hrs  T(C): 36.3 (2022 10:42), Max: 36.9 (2022 12:39)  T(F): 97.4 (2022 10:42), Max: 98.4 (2022 12:39)  HR: 85 (2022 10:42) (75 - 85)  BP: 110/70 (2022 10:42) (98/59 - 123/74)  BP(mean): --  RR: 18 (2022 10:42) (17 - 18)  SpO2: 98% (2022 10:42) (96% - 99%)    PHYSICAL EXAM:  GENERAL: NAD  HEAD:  Atraumatic, Normocephalic  EYES: EOMI, PERRLA, conjunctiva and sclera clear  ENMT: No tonsillar erythema, exudates, or enlargement;   NECK: Supple, Normal thyroid  NERVOUS SYSTEM:  Alert & Oriented  Motor Strength 4/5 left lower extremities; DTRs 2+ intact and symmetric  CHEST/LUNG: mild scattered wheeze. no distress   HEART: Regular rate and rhythm; No murmurs, rubs, or gallops  ABDOMEN: Soft, Nontender, Nondistended; Bowel sounds present  EXTREMITIES:  2+ Peripheral Pulses, No clubbing, cyanosis, or edema    SKIN: No rashes or lesions    LABS:                                              9.3    15.56 )-----------( 384      ( 2022 10:46 )             28.7   06-13    139  |  105  |  21  ----------------------------<  164<H>  3.5   |  26  |  1.41<H>    Ca    8.6      2022 10:46  Phos  2.6     06-13  Mg     2.1     06-13                            Urinalysis Basic - ( 2022 14:43 )    Color: Yellow / Appearance: very cloudy / S.010 / pH: x  Gluc: x / Ketone: Negative  / Bili: Negative / Urobili: 1 mg/dL   Blood: x / Protein: 100 mg/dL / Nitrite: Negative   Leuk Esterase: Moderate / RBC: 6-10 /HPF / WBC >50   Sq Epi: x / Non Sq Epi: Few / Bacteria: Many      CAPILLARY BLOOD GLUCOSE      CAPILLARY BLOOD GLUCOSE      A1C with Estimated Average Glucose (22 @ 08:57)    A1C with Estimated Average Glucose Result: 6.4: Method: Immunoassay       Reference Range                4.0-5.6%       High risk (prediabetic)        5.7-6.4%       Diabetic, diagnostic             >=6.5%       ADA diabetic treatment goal       <7.0%  The Hemoglobin A1c testing is NGSP-certified.Reference ranges are based  upon the 2010 recommendations of  the American Diabetes Association.  Interpretation may vary for children  and adolescents. %    Estimated Average Glucose: 137: The Estimated Average Glucose (eAG) or Mean Plasma Glucose (MPG) value is  calculated from the hemoglobin A1c value and covers the same time period.   The American Diabetes Association (ADA) and other professional  organizations recommend reporting the eAG with the HgbA1c. mg/dL        RADIOLOGY & ADDITIONAL TESTS:    Imaging Personally Reviewed:  [ ] YES  [ ] NO    Consultant(s) Notes Reviewed:  [ ] YES  [ ] NO    Care Discussed with Consultants/Other Providers [ ] YES  [ ] NO Patient is a 76y old  Female who presents with a chief complaint of UTI, AMS (2022 11:19)      INTERVAL HPI/OVERNIGHT EVENTS:  none      MEDICATIONS  (STANDING):  ALBUTerol    90 MICROgram(s) HFA Inhaler 1 Puff(s) Inhalation every 4 hours  ammonium lactate 12% Lotion 1 Application(s) Topical two times a day  atorvastatin 40 milliGRAM(s) Oral at bedtime  cefTRIAXone   IVPB 2000 milliGRAM(s) IV Intermittent every 24 hours  heparin   Injectable 5000 Unit(s) SubCutaneous every 12 hours  polyethylene glycol 3350 17 Gram(s) Oral daily    MEDICATIONS  (PRN):    Allergies    No Known Allergies    Vital Signs Last 24 Hrs  T(C): 36.3 (2022 10:42), Max: 36.9 (2022 12:39)  T(F): 97.4 (2022 10:42), Max: 98.4 (2022 12:39)  HR: 85 (2022 10:42) (75 - 85)  BP: 110/70 (2022 10:42) (98/59 - 123/74)  BP(mean): --  RR: 18 (2022 10:42) (17 - 18)  SpO2: 98% (2022 10:42) (96% - 99%)    PHYSICAL EXAM:  GENERAL: NAD  HEAD:  Atraumatic, Normocephalic  EYES: EOMI, PERRLA, conjunctiva and sclera clear  ENMT: No tonsillar erythema, exudates, or enlargement;   NECK: Supple, Normal thyroid  NERVOUS SYSTEM:  Alert & Oriented  Motor Strength 4/5 left lower extremities; DTRs 2+ intact and symmetric  CHEST/LUNG: b/l cta  no wheeze. no distress   HEART: Regular rate and rhythm; No murmurs, rubs, or gallops  ABDOMEN: Soft, Nontender, Nondistended; Bowel sounds present  EXTREMITIES:  2+ Peripheral Pulses, No clubbing, cyanosis, or edema    SKIN: No rashes or lesions    LABS:                                              9.3    15.56 )-----------( 384      ( 2022 10:46 )             28.7   06-13    139  |  105  |  21  ----------------------------<  164<H>  3.5   |  26  |  1.41<H>    Ca    8.6      2022 10:46  Phos  2.6     06-  Mg     2.1     06-13                            Urinalysis Basic - ( 2022 14:43 )    Color: Yellow / Appearance: very cloudy / S.010 / pH: x  Gluc: x / Ketone: Negative  / Bili: Negative / Urobili: 1 mg/dL   Blood: x / Protein: 100 mg/dL / Nitrite: Negative   Leuk Esterase: Moderate / RBC: 6-10 /HPF / WBC >50   Sq Epi: x / Non Sq Epi: Few / Bacteria: Many      CAPILLARY BLOOD GLUCOSE      CAPILLARY BLOOD GLUCOSE      A1C with Estimated Average Glucose (22 @ 08:57)    A1C with Estimated Average Glucose Result: 6.4: Method: Immunoassay       Reference Range                4.0-5.6%       High risk (prediabetic)        5.7-6.4%       Diabetic, diagnostic             >=6.5%       ADA diabetic treatment goal       <7.0%  The Hemoglobin A1c testing is NGSP-certified.Reference ranges are based  upon the 2010 recommendations of  the American Diabetes Association.  Interpretation may vary for children  and adolescents. %    Estimated Average Glucose: 137: The Estimated Average Glucose (eAG) or Mean Plasma Glucose (MPG) value is  calculated from the hemoglobin A1c value and covers the same time period.   The American Diabetes Association (ADA) and other professional  organizations recommend reporting the eAG with the HgbA1c. mg/dL        RADIOLOGY & ADDITIONAL TESTS:    Imaging Personally Reviewed:  [ ] YES  [ ] NO    Consultant(s) Notes Reviewed:  [ ] YES  [ ] NO    Care Discussed with Consultants/Other Providers [ ] YES  [ ] NO

## 2022-06-13 NOTE — PROGRESS NOTE ADULT - PROBLEM SELECTOR PROBLEM 2
UTI (urinary tract infection)
Sepsis due to Escherichia coli

## 2022-06-14 VITALS
DIASTOLIC BLOOD PRESSURE: 77 MMHG | OXYGEN SATURATION: 97 % | HEART RATE: 76 BPM | SYSTOLIC BLOOD PRESSURE: 153 MMHG | TEMPERATURE: 98 F | RESPIRATION RATE: 16 BRPM

## 2022-06-14 LAB
FLUAV AG NPH QL: SIGNIFICANT CHANGE UP
FLUBV AG NPH QL: SIGNIFICANT CHANGE UP
SARS-COV-2 RNA SPEC QL NAA+PROBE: SIGNIFICANT CHANGE UP

## 2022-06-14 PROCEDURE — 99239 HOSP IP/OBS DSCHRG MGMT >30: CPT

## 2022-06-14 PROCEDURE — 99308 SBSQ NF CARE LOW MDM 20: CPT

## 2022-06-14 RX ORDER — AMLODIPINE BESYLATE 2.5 MG/1
1 TABLET ORAL
Qty: 30 | Refills: 0
Start: 2022-06-14 | End: 2022-07-13

## 2022-06-14 RX ORDER — ASPIRIN/CALCIUM CARB/MAGNESIUM 324 MG
1 TABLET ORAL
Qty: 30 | Refills: 0
Start: 2022-06-14 | End: 2022-07-13

## 2022-06-14 RX ADMIN — AMLODIPINE BESYLATE 5 MILLIGRAM(S): 2.5 TABLET ORAL at 05:21

## 2022-06-14 RX ADMIN — ALBUTEROL 1 PUFF(S): 90 AEROSOL, METERED ORAL at 01:33

## 2022-06-14 RX ADMIN — ALBUTEROL 1 PUFF(S): 90 AEROSOL, METERED ORAL at 11:35

## 2022-06-14 RX ADMIN — POLYETHYLENE GLYCOL 3350 17 GRAM(S): 17 POWDER, FOR SOLUTION ORAL at 11:35

## 2022-06-14 RX ADMIN — HEPARIN SODIUM 5000 UNIT(S): 5000 INJECTION INTRAVENOUS; SUBCUTANEOUS at 05:22

## 2022-06-14 RX ADMIN — Medication 1 APPLICATION(S): at 05:21

## 2022-06-14 RX ADMIN — ALBUTEROL 1 PUFF(S): 90 AEROSOL, METERED ORAL at 05:22

## 2022-06-16 ENCOUNTER — NON-APPOINTMENT (OUTPATIENT)
Age: 77
End: 2022-06-16

## 2022-06-16 LAB
CULTURE RESULTS: SIGNIFICANT CHANGE UP
CULTURE RESULTS: SIGNIFICANT CHANGE UP
SPECIMEN SOURCE: SIGNIFICANT CHANGE UP
SPECIMEN SOURCE: SIGNIFICANT CHANGE UP

## 2022-06-18 DIAGNOSIS — E87.6 HYPOKALEMIA: ICD-10-CM

## 2022-06-18 DIAGNOSIS — N18.2 CHRONIC KIDNEY DISEASE, STAGE 2 (MILD): ICD-10-CM

## 2022-06-18 DIAGNOSIS — A41.9 SEPSIS, UNSPECIFIED ORGANISM: ICD-10-CM

## 2022-06-18 DIAGNOSIS — N17.9 ACUTE KIDNEY FAILURE, UNSPECIFIED: ICD-10-CM

## 2022-06-18 DIAGNOSIS — M19.90 UNSPECIFIED OSTEOARTHRITIS, UNSPECIFIED SITE: ICD-10-CM

## 2022-06-18 DIAGNOSIS — A41.51 SEPSIS DUE TO ESCHERICHIA COLI [E. COLI]: ICD-10-CM

## 2022-06-18 DIAGNOSIS — E87.2 ACIDOSIS: ICD-10-CM

## 2022-06-18 DIAGNOSIS — E11.22 TYPE 2 DIABETES MELLITUS WITH DIABETIC CHRONIC KIDNEY DISEASE: ICD-10-CM

## 2022-06-18 DIAGNOSIS — N13.30 UNSPECIFIED HYDRONEPHROSIS: ICD-10-CM

## 2022-06-18 DIAGNOSIS — D63.1 ANEMIA IN CHRONIC KIDNEY DISEASE: ICD-10-CM

## 2022-06-18 DIAGNOSIS — E78.5 HYPERLIPIDEMIA, UNSPECIFIED: ICD-10-CM

## 2022-06-18 DIAGNOSIS — F03.90 UNSPECIFIED DEMENTIA, UNSPECIFIED SEVERITY, WITHOUT BEHAVIORAL DISTURBANCE, PSYCHOTIC DISTURBANCE, MOOD DISTURBANCE, AND ANXIETY: ICD-10-CM

## 2022-06-18 DIAGNOSIS — N39.0 URINARY TRACT INFECTION, SITE NOT SPECIFIED: ICD-10-CM

## 2022-06-18 DIAGNOSIS — G93.41 METABOLIC ENCEPHALOPATHY: ICD-10-CM

## 2022-06-22 ENCOUNTER — APPOINTMENT (OUTPATIENT)
Dept: INTERNAL MEDICINE | Facility: CLINIC | Age: 77
End: 2022-06-22

## 2022-07-02 PROBLEM — M19.90 UNSPECIFIED OSTEOARTHRITIS, UNSPECIFIED SITE: Chronic | Status: ACTIVE | Noted: 2022-06-06

## 2022-07-02 PROBLEM — I10 ESSENTIAL (PRIMARY) HYPERTENSION: Chronic | Status: ACTIVE | Noted: 2022-06-06

## 2022-07-02 PROBLEM — I63.9 CEREBRAL INFARCTION, UNSPECIFIED: Chronic | Status: ACTIVE | Noted: 2022-06-06

## 2022-07-11 ENCOUNTER — APPOINTMENT (OUTPATIENT)
Dept: INTERNAL MEDICINE | Facility: CLINIC | Age: 77
End: 2022-07-11

## 2022-07-12 ENCOUNTER — APPOINTMENT (OUTPATIENT)
Dept: INTERNAL MEDICINE | Facility: CLINIC | Age: 77
End: 2022-07-12

## 2022-07-12 VITALS
OXYGEN SATURATION: 98 % | DIASTOLIC BLOOD PRESSURE: 76 MMHG | HEART RATE: 86 BPM | BODY MASS INDEX: 35.82 KG/M2 | WEIGHT: 215 LBS | SYSTOLIC BLOOD PRESSURE: 121 MMHG | HEIGHT: 65 IN

## 2022-07-12 DIAGNOSIS — E78.5 HYPERLIPIDEMIA, UNSPECIFIED: ICD-10-CM

## 2022-07-12 DIAGNOSIS — M54.50 LOW BACK PAIN, UNSPECIFIED: ICD-10-CM

## 2022-07-12 PROCEDURE — 99214 OFFICE O/P EST MOD 30 MIN: CPT

## 2022-07-12 RX ORDER — SULFAMETHOXAZOLE AND TRIMETHOPRIM 800; 160 MG/1; MG/1
800-160 TABLET ORAL
Qty: 14 | Refills: 0 | Status: DISCONTINUED | COMMUNITY
Start: 2022-07-01 | End: 2022-07-12

## 2022-07-12 RX ORDER — HYDROCORTISONE 1 %
12 CREAM (GRAM) TOPICAL
Qty: 225 | Refills: 0 | Status: ACTIVE | COMMUNITY
Start: 2022-07-01

## 2022-07-12 RX ORDER — CEFPODOXIME PROXETIL 200 MG/1
200 TABLET, FILM COATED ORAL
Qty: 16 | Refills: 0 | Status: DISCONTINUED | COMMUNITY
Start: 2022-06-14

## 2022-07-12 NOTE — REVIEW OF SYSTEMS
[Fever] : no fever [Chills] : no chills [Fatigue] : no fatigue [Night Sweats] : no night sweats [Chest Pain] : no chest pain [Palpitations] : no palpitations [Lower Ext Edema] : no lower extremity edema [Wheezing] : no wheezing [Cough] : no cough [Dyspnea on Exertion] : no dyspnea on exertion [Abdominal Pain] : no abdominal pain [Nausea] : no nausea [Constipation] : no constipation [Diarrhea] : diarrhea [Vomiting] : no vomiting [Headache] : no headache [Dizziness] : no dizziness [Fainting] : no fainting [Confusion] : no confusion [Unsteady Walking] : no ataxia [Negative] : Heme/Lymph

## 2022-07-12 NOTE — ASSESSMENT
[FreeTextEntry1] : s/p hospitalization for sepsis, UTI, metabolic encephalitis:\par Completed antibiotics.\par Feeling well.\par Home from rehab 1.5 weeks.\par Labs and imaging reviewed.  Resolution of hydronephrosis.  \par Will get repeat labs - CBC, CMP, UAx\par \par Constipation:\par Trying to get in increased fiber\par Has MiraLax, Colace if needed.  \par Daughter encouraging her to drink lots of fluids.  \par \par Deconditioning, h/o CVA with right sided weakness:\par Continues PT at home\par Albuterol as needed for dyspnea\par \par Hypertension:\par Now just taking amlodipine, BP well controlled\par \par H/o CVA:\par BP well controlled, taking statin\par

## 2022-07-12 NOTE — PHYSICAL EXAM
[No Acute Distress] : no acute distress [Well-Appearing] : well-appearing [No Carotid Bruits] : no carotid bruits [Pedal Pulses Present] : the pedal pulses are present [No Edema] : there was no peripheral edema [Normal] : no joint swelling and grossly normal strength and tone [Coordination Grossly Intact] : coordination grossly intact [Normal Affect] : the affect was normal [Alert and Oriented x3] : oriented to person, place, and time [Normal Insight/Judgement] : insight and judgment were intact [de-identified] : weakness RLE, 3-4/5

## 2022-07-12 NOTE — HISTORY OF PRESENT ILLNESS
[de-identified] : 76 y.o. 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 \par Presents for f/u s/p hospitalization for urosepsis then inpatient rehab.  \par Unsure what happened.  Got up at night to the bathroom, ended up on the floor (but does not remember actually falling).  Called out to her  as she could not get up.  Still could not get up with his help.  He then dragged her on a towel to the bed, but still unable to help get her up.  Called neighbors to help, then called EMS who took her to the hospital.  Reports being fully cognisant of all the events except for how she ended up on the floor.  \par Febrile on admission, 103, UTI, +blood cultures, encephalopathy.  6/13-6/13, then rehab from 6/13-7/1.  \par Presents today for follow up. \par Feeling well.  Continues outpatient rehab.  \par \par "Hospital Course: \par Discharge Date	13-Jun-2022 \par Admission Date	06-Jun-2022 17:00 \par Reason for Admission - UTI, AMS 	 \par 76 yoF with a PMHx of CVA with R sided deficits, OA, HTN, HLD who presents to [facility] ED on 6/6 for AMS.  Patient stated that she was returning from the bathroom that morning when she suddenly felt dizzy and fell onto her back. \par Patient was found on the floor by family and required assistance to get back on her feet.  Family noted increased confusion after the fall and called EMS. \par Patient's family was present at the bedside and stated that she was at her baseline mental status.  Patient stated that she had some dysuria for about a week but denies history of fever, chills, nausea, vomiting, chest pain, palpitations, cough, or dyspnea.  In the ED she was febrile to 103.8, labs significant for leukocytosis with left shift, lactic acidosis, elevated creatinine, + U/A.  X-rays negative for acute fractures. CT Abdomen/ pelvis with  right hydroureteronephrosis, urology consulted, ridley placed. Patient was started on ceftriaxone and admitted to medicine for sepsis, UTI and metabolic encephalopathy. Blood and urine cultures both with E.Coli sensitive to ceftriaxone, ceftriaxone continued, ID consulted. WBC trending down and patient is afebrile since 6/6. Lactic acidosis resolved. Cr improved (patient \par does have hx of underlying CKD 2-3 from 2015) renal US from 6/8 showed resolution of hydronephrosis. Ridley removed and patient passed TOV 6/10. Labs significant for microcytic anemia, no signs of active bleeding, hemodynamically stable, occult stool negative x1, iron studies consistent with anemia of chronic disease, patient was advised to follow up with pmd for further testing stool occult blood is negative. Repeat blood cultures NGTD. Encephalopathy resolved, patient as baseline. Patient is to continue Vantin 200mg bid for 8 \par days and follow up with pmd. "\par

## 2022-07-14 DIAGNOSIS — R79.89 OTHER SPECIFIED ABNORMAL FINDINGS OF BLOOD CHEMISTRY: ICD-10-CM

## 2022-07-14 LAB
ALBUMIN SERPL ELPH-MCNC: 4.3 G/DL
ALP BLD-CCNC: 176 U/L
ALT SERPL-CCNC: 18 U/L
ANION GAP SERPL CALC-SCNC: 13 MMOL/L
AST SERPL-CCNC: 16 U/L
BASOPHILS # BLD AUTO: 0.05 K/UL
BASOPHILS NFR BLD AUTO: 0.7 %
BILIRUB SERPL-MCNC: 0.4 MG/DL
BUN SERPL-MCNC: 20 MG/DL
CALCIUM SERPL-MCNC: 10 MG/DL
CHLORIDE SERPL-SCNC: 104 MMOL/L
CO2 SERPL-SCNC: 21 MMOL/L
CREAT SERPL-MCNC: 1.35 MG/DL
EGFR: 40 ML/MIN/1.73M2
EOSINOPHIL # BLD AUTO: 0.41 K/UL
EOSINOPHIL NFR BLD AUTO: 5.4 %
GLUCOSE SERPL-MCNC: 100 MG/DL
HCT VFR BLD CALC: 37.3 %
HGB BLD-MCNC: 11.6 G/DL
IMM GRANULOCYTES NFR BLD AUTO: 0.3 %
LYMPHOCYTES # BLD AUTO: 2.18 K/UL
LYMPHOCYTES NFR BLD AUTO: 28.6 %
MAN DIFF?: NORMAL
MCHC RBC-ENTMCNC: 27.6 PG
MCHC RBC-ENTMCNC: 31.1 GM/DL
MCV RBC AUTO: 88.8 FL
MONOCYTES # BLD AUTO: 0.55 K/UL
MONOCYTES NFR BLD AUTO: 7.2 %
NEUTROPHILS # BLD AUTO: 4.42 K/UL
NEUTROPHILS NFR BLD AUTO: 57.8 %
PLATELET # BLD AUTO: 376 K/UL
POTASSIUM SERPL-SCNC: 4.4 MMOL/L
PROT SERPL-MCNC: 8.1 G/DL
RBC # BLD: 4.2 M/UL
RBC # FLD: 18 %
SODIUM SERPL-SCNC: 137 MMOL/L
WBC # FLD AUTO: 7.63 K/UL

## 2022-07-19 ENCOUNTER — APPOINTMENT (OUTPATIENT)
Dept: INTERNAL MEDICINE | Facility: CLINIC | Age: 77
End: 2022-07-19

## 2022-07-19 ENCOUNTER — NON-APPOINTMENT (OUTPATIENT)
Age: 77
End: 2022-07-19

## 2022-07-19 PROCEDURE — 99443: CPT | Mod: 95

## 2022-07-26 ENCOUNTER — APPOINTMENT (OUTPATIENT)
Dept: INTERNAL MEDICINE | Facility: CLINIC | Age: 77
End: 2022-07-26

## 2022-07-26 PROCEDURE — 99442: CPT | Mod: 95

## 2022-07-28 NOTE — PHYSICAL EXAM
[No Acute Distress] : no acute distress [Normal Voice/Communication] : normal voice/communication [No Respiratory Distress] : no respiratory distress  [de-identified] : Visit performed telephonically hence exam is limited and based on auditory cues.

## 2022-07-28 NOTE — HISTORY OF PRESENT ILLNESS
[Home] : at home, [unfilled] , at the time of the visit. [Medical Office: ([facility])___] : at the medical office located in  [Verbal consent obtained from patient] : the patient, [unfilled] [FreeTextEntry8] : Patient presents for telephonic visit for a positive Covid 19 test from Saturday.\par \par Symptoms: coughing, with a lot of mucous coming up at first present now seems to be slowing down.  Symptoms present for 2 weeks.\par \par Rehab person came this morning, checked her temperature and said it was not present, left quickly due to patient not looking well.\par \par Patient wearing her mask.  \par \par Was feeling very tired. \par \par Had gotten sick early July after her daughter's 50th birthday.\par \par Denying any shortness of breath, but noticing that if she is doing talking has a lot of mucus.  \par \par Patient taking the inhaler,

## 2022-08-02 ENCOUNTER — APPOINTMENT (OUTPATIENT)
Dept: NEPHROLOGY | Facility: CLINIC | Age: 77
End: 2022-08-02

## 2022-08-02 VITALS
WEIGHT: 224.87 LBS | HEART RATE: 57 BPM | HEIGHT: 65 IN | DIASTOLIC BLOOD PRESSURE: 89 MMHG | TEMPERATURE: 97 F | OXYGEN SATURATION: 98 % | BODY MASS INDEX: 37.47 KG/M2 | SYSTOLIC BLOOD PRESSURE: 152 MMHG

## 2022-08-02 DIAGNOSIS — N17.9 ACUTE KIDNEY FAILURE, UNSPECIFIED: ICD-10-CM

## 2022-08-02 PROCEDURE — 99205 OFFICE O/P NEW HI 60 MIN: CPT

## 2022-08-05 LAB
ANION GAP SERPL CALC-SCNC: 12 MMOL/L
BUN SERPL-MCNC: 13 MG/DL
CALCIUM SERPL-MCNC: 10.2 MG/DL
CHLORIDE SERPL-SCNC: 104 MMOL/L
CO2 SERPL-SCNC: 25 MMOL/L
CREAT SERPL-MCNC: 1.19 MG/DL
EGFR: 47 ML/MIN/1.73M2
GLUCOSE SERPL-MCNC: 125 MG/DL
POTASSIUM SERPL-SCNC: 4 MMOL/L
SODIUM SERPL-SCNC: 142 MMOL/L

## 2022-08-05 NOTE — PHYSICAL EXAM
[General Appearance - Alert] : alert [General Appearance - In No Acute Distress] : in no acute distress [General Appearance - Well Nourished] : well nourished [General Appearance - Well Developed] : well developed [General Appearance - Well-Appearing] : healthy appearing [Sclera] : the sclera and conjunctiva were normal [PERRL With Normal Accommodation] : pupils were equal in size, round, and reactive to light [Extraocular Movements] : extraocular movements were intact [Outer Ear] : the ears and nose were normal in appearance [Hearing Threshold Finger Rub Not [place]] : hearing was normal [Examination Of The Oral Cavity] : the lips and gums were normal [Neck Appearance] : the appearance of the neck was normal [Neck Cervical Mass (___cm)] : no neck mass was observed [Jugular Venous Distention Increased] : there was no jugular-venous distention [Thyroid Diffuse Enlargement] : the thyroid was not enlarged [Respiration, Rhythm And Depth] : normal respiratory rhythm and effort [Exaggerated Use Of Accessory Muscles For Inspiration] : no accessory muscle use [Auscultation Breath Sounds / Voice Sounds] : lungs were clear to auscultation bilaterally [Heart Rate And Rhythm] : heart rate was normal and rhythm regular [Heart Sounds] : normal S1 and S2 [Heart Sounds Gallop] : no gallops [Murmurs] : no murmurs [Heart Sounds Pericardial Friction Rub] : no pericardial rub [Arterial Pulses Carotid] : carotid pulses were normal with no bruits [Edema] : there was no peripheral edema [Bowel Sounds] : normal bowel sounds [Abdomen Soft] : soft [Abdomen Tenderness] : non-tender [Abdomen Mass (___ Cm)] : no abdominal mass palpated [No CVA Tenderness] : no ~M costovertebral angle tenderness [No Spinal Tenderness] : no spinal tenderness [Abnormal Walk] : normal gait [Nail Clubbing] : no clubbing  or cyanosis of the fingernails [Musculoskeletal - Swelling] : no joint swelling seen [Skin Color & Pigmentation] : normal skin color and pigmentation [Skin Turgor] : normal skin turgor [] : no rash [Skin Lesions] : no skin lesions [Oriented To Time, Place, And Person] : oriented to person, place, and time [Impaired Insight] : insight and judgment were intact [Affect] : the affect was normal

## 2022-08-05 NOTE — ASSESSMENT
[FreeTextEntry1] : GISEL- from UTI/ Urosepsis. baseline creat was 1.2> peaked to 2.5 in the hospital and now 1.19 mg/dl \par eGFR 47 ml/min. baseline ageing nephrosclerosis possibly worsened by uncontrolled BP in the past. but currently doing well. no urinalysis was able to be done today\par \par ckd 3- as above

## 2022-08-05 NOTE — HISTORY OF PRESENT ILLNESS
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 she had a recent admission for UTI and urosepsis \par \par CT Abdomen/ pelvis had shown right hydroureteronephrosis. Patient was started on ceftriaxone and admitted to medicine for sepsis, UTI and metabolic encephalopathy. Blood and urine cultures both with E.Coli sensitive to ceftriaxone. Creat was 2.5 on admission ( baseline was 1.2 mg/dl) after treatment of UTI, resolution of hydro and some fluids, she improved to her baseline creatinine\par \par Renal US from 6/8 showed resolution of hydronephrosis. \par \par today she feels well. denies any complaints \par \par PMH - As above \par \par social H- denies smoking, alcohol or drugs \par \par family H- none of kidney disease \par \par ROS\par - No changes in urination, no bloood in urine, no foamy urine \par CVS- No chest pain, no shortness of breath\par all other systems reviewed in detail and were negative except as above \par \par

## 2022-08-10 ENCOUNTER — APPOINTMENT (OUTPATIENT)
Dept: INTERNAL MEDICINE | Facility: CLINIC | Age: 77
End: 2022-08-10

## 2022-08-10 VITALS
TEMPERATURE: 97.9 F | BODY MASS INDEX: 36.49 KG/M2 | DIASTOLIC BLOOD PRESSURE: 95 MMHG | SYSTOLIC BLOOD PRESSURE: 152 MMHG | OXYGEN SATURATION: 97 % | HEART RATE: 132 BPM | HEIGHT: 65 IN | WEIGHT: 219 LBS

## 2022-08-10 VITALS — DIASTOLIC BLOOD PRESSURE: 90 MMHG | SYSTOLIC BLOOD PRESSURE: 142 MMHG

## 2022-08-10 DIAGNOSIS — I12.9 HYPERTENSIVE CHRONIC KIDNEY DISEASE WITH STAGE 1 THROUGH STAGE 4 CHRONIC KIDNEY DISEASE, OR UNSPECIFIED CHRONIC KIDNEY DISEASE: Chronic | ICD-10-CM

## 2022-08-10 PROCEDURE — 99214 OFFICE O/P EST MOD 30 MIN: CPT

## 2022-08-10 NOTE — HISTORY OF PRESENT ILLNESS
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 Patient was started on ceftriaxone and admitted to medicine for sepsis, UTI and metabolic encephalopathy. Blood and urine cultures both with E.Coli sensitive to ceftriaxone. Creat was 2.5 on admission ( baseline was 1.2 mg/dl) after treatment of UTI, resolution of hydro and some fluids, she improved to her baseline creatinine\par Renal US from 6/8 22 showed resolution of hydronephrosis. \par \par Grief / depression -Lost son 11/2019 - commited Suicide- doing better now , no SI/HI, lives with spouse \par daughter lives near by 10 minutes away \par \par Hypertensive -\par -saw cardio 9/2019 -Echo: LVEF 55% with mild MR and TR.\par Nuclear stress test: LVEF 55%; no ischemia or scar.\par -home readings 130-140/80 \par - compliant with medication and diet, no cp, sob, no palpitations. No dizzy spells.\par \par Hyperlipidemia --on atorvastatin 20 mg compliant with medications and diet,. Denies any muscle pain. \par \par Cva 2012 rt hemeparesis-- stopped going to [employer] minimal physical activity now , gained weight, used to socialize there , and helps her strengthen her body.  helping her - drives to [employer]. Does arthritis exercise. Walks with rollator now \par Gained weight \par All are senior citizens. Now walking with walker rollator as much as possible.- has balance issues - legs crumbled on her weight while getting out of bed last week no trauma to head -  helped her get up - now walking with walker no problem \par -daughter helps with IADLS \par - Lives with . @ daughters in [place] , \par \par pre dm -eats rice daily , no bread , potato and fruits , has decreased physical activity \par  \par

## 2022-08-10 NOTE — ASSESSMENT
[FreeTextEntry1] : Hypertension -142/90 rechecked - add losartan 25 qd, BMP next visit -cont amlodipine 10 qd \par - continue current medications, low sodium-DASH diet, Educated patient on avoiding canned food process food fast food, including 3-4 servings of fruits and vegetables a day.\par \par URTI 7/2022 -- did tele visit 7/26/22 - resolved no s/s \par \par CKD stage 2 - SANDRA - resolved \par UTI - recent admisiion for urosepsis 6/6/22 - saw nephro for Sandra -did CT Abdomen/ pelvis had shown right hydroureteronephrosis. Patient was started on ceftriaxone and admitted to medicine for sepsis, UTI and metabolic encephalopathy. Blood and urine cultures both with E.Coli sensitive to ceftriaxone. Creat was 2.5 on admission ( baseline was 1.2 mg/dl) after treatment of UTI, resolution of hydro and some fluids, she improved to her baseline creatinine\par Renal US from 6/8 22 showed resolution of hydronephrosis.\par \par BMI - 36 \par -lost weight 20 lbs since last 3/2021 - \par - discussed caloric control , portion control , weight loss, increase physical activity\par \par Grief/ depression PHQ-9 score 6 mild \par  - lost son 11/2019 \par \par gait and balance disturbance- PT referral given - walking with rollators \par \par History of CVA with right hemiparesis -stable, patient trying to be independent as much as possible, walking with walker , continue current medication advice to be physically active as much as possible. advised to join [employer] programs- will need acces a ride help for transportation as next BUs stop is 2 blocks and cannot walk long distances \par -continue aspirin, educated patient to have a better blood pressure control.\par -lipitor increased to 40 -rtc 3 months redo LFT / lipids \par \par Hyperlipidemia--Lipitor increased to 40 -rtc 3 months redo LFT / lipids \par Controlled, continue current medications, check lipid levels\par Low-fat diet, avoid red meat, cheese, butter, peanuts and exercise daily for 40 minutes.\par \par lower back pain-stable, advised patient to lose weight, exercise daily \par \par Pre Dm- increase Physical activity and eat low carb diet , loose weight , check AIC \par \par Health maintenance \par Flu vaccination - 2019\par Colonoscope 6/2014 \par Mammogram 4/2021 Bi rad 2 \par Tetanus vaccine- 2014\par Pneumovax -2014\par Prevnar 13 - given 2015\par zostavax -pt will check with insurance for coverage and let me know next visit. \par pfizer 3/5/21, 3/19/21. \par

## 2023-01-06 ENCOUNTER — APPOINTMENT (OUTPATIENT)
Dept: INTERNAL MEDICINE | Facility: CLINIC | Age: 78
End: 2023-01-06
Payer: MEDICARE

## 2023-01-06 VITALS
WEIGHT: 206 LBS | BODY MASS INDEX: 34.32 KG/M2 | TEMPERATURE: 98.5 F | HEIGHT: 65 IN | OXYGEN SATURATION: 98 % | SYSTOLIC BLOOD PRESSURE: 146 MMHG | DIASTOLIC BLOOD PRESSURE: 70 MMHG | HEART RATE: 112 BPM

## 2023-01-06 DIAGNOSIS — E66.9 OBESITY, UNSPECIFIED: Chronic | ICD-10-CM

## 2023-01-06 DIAGNOSIS — Z23 ENCOUNTER FOR IMMUNIZATION: ICD-10-CM

## 2023-01-06 DIAGNOSIS — E66.01 MORBID (SEVERE) OBESITY DUE TO EXCESS CALORIES: ICD-10-CM

## 2023-01-06 DIAGNOSIS — R05.3 CHRONIC COUGH: ICD-10-CM

## 2023-01-06 PROCEDURE — 99213 OFFICE O/P EST LOW 20 MIN: CPT | Mod: 25

## 2023-01-06 PROCEDURE — G0008: CPT

## 2023-01-06 PROCEDURE — 90662 IIV NO PRSV INCREASED AG IM: CPT

## 2023-01-06 PROCEDURE — G0439: CPT

## 2023-01-06 NOTE — PHYSICAL EXAM
[No Acute Distress] : no acute distress [Well-Appearing] : well-appearing [Normal Sclera/Conjunctiva] : normal sclera/conjunctiva [PERRL] : pupils equal round and reactive to light [EOMI] : extraocular movements intact [Normal Outer Ear/Nose] : the outer ears and nose were normal in appearance [Normal Oropharynx] : the oropharynx was normal [No JVD] : no jugular venous distention [No Lymphadenopathy] : no lymphadenopathy [Supple] : supple [Thyroid Normal, No Nodules] : the thyroid was normal and there were no nodules present [No Respiratory Distress] : no respiratory distress  [No Accessory Muscle Use] : no accessory muscle use [Clear to Auscultation] : lungs were clear to auscultation bilaterally [Normal Rate] : normal rate  [Regular Rhythm] : with a regular rhythm [Normal S1, S2] : normal S1 and S2 [No Murmur] : no murmur heard [No Carotid Bruits] : no carotid bruits [No Abdominal Bruit] : a ~M bruit was not heard ~T in the abdomen [No Varicosities] : no varicosities [Pedal Pulses Present] : the pedal pulses are present [No Edema] : there was no peripheral edema [No Palpable Aorta] : no palpable aorta [No Extremity Clubbing/Cyanosis] : no extremity clubbing/cyanosis [Soft] : abdomen soft [Non Tender] : non-tender [Non-distended] : non-distended [No Masses] : no abdominal mass palpated [No HSM] : no HSM [Normal Bowel Sounds] : normal bowel sounds [Normal Posterior Cervical Nodes] : no posterior cervical lymphadenopathy [Normal Anterior Cervical Nodes] : no anterior cervical lymphadenopathy [No CVA Tenderness] : no CVA  tenderness [No Spinal Tenderness] : no spinal tenderness [No Joint Swelling] : no joint swelling [Grossly Normal Strength/Tone] : grossly normal strength/tone [No Rash] : no rash [Coordination Grossly Intact] : coordination grossly intact [No Focal Deficits] : no focal deficits [Normal Gait] : normal gait [Deep Tendon Reflexes (DTR)] : deep tendon reflexes were 2+ and symmetric [Normal Affect] : the affect was normal [Normal Insight/Judgement] : insight and judgment were intact [phone]

## 2023-01-06 NOTE — HEALTH RISK ASSESSMENT
[Good] : ~his/her~  mood as  good [Never] : Never [No] : In the past 12 months have you used drugs other than those required for medical reasons? No [No falls in past year] : Patient reported no falls in the past year [0] : 2) Feeling down, depressed, or hopeless: Not at all (0) [PHQ-2 Negative - No further assessment needed] : PHQ-2 Negative - No further assessment needed [With Significant Other] : lives with significant other [Retired] : retired [] :  [Fully functional (bathing, dressing, toileting, transferring, walking, feeding)] : Fully functional (bathing, dressing, toileting, transferring, walking, feeding) [Fully functional (using the telephone, shopping, preparing meals, housekeeping, doing laundry, using] : Fully functional and needs no help or supervision to perform IADLs (using the telephone, shopping, preparing meals, housekeeping, doing laundry, using transportation, managing medications and managing finances) [With Patient/Caregiver] : , with patient/caregiver [Designated Healthcare Proxy] : Designated healthcare proxy [Name: ___] : Health Care Proxy's Name: [unfilled]  [Relationship: ___] : Relationship: [unfilled] [de-identified] : goes to gym 1 day a week -1 hr , [[id number]] : 0 [Reports changes in hearing] : Reports no changes in hearing [Reports changes in vision] : Reports no changes in vision [Reports changes in dental health] : Reports no changes in dental health [de-identified] : walks with waker  [AdvancecareDate] : 1/6/23

## 2023-01-06 NOTE — HISTORY OF PRESENT ILLNESS
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 Patient was started on ceftriaxone and admitted to medicine for sepsis, UTI and metabolic encephalopathy. Blood and urine cultures both with E.Coli sensitive to ceftriaxone. Creat was 2.5 on admission ( baseline was 1.2 mg/dl) after treatment of UTI, resolution of hydro and some fluids, she improved to her baseline creatinine\par Renal US from 6/8 22 showed resolution of hydronephrosis. \par \par Grief / depression -Lost son 11/2019 - commited Suicide- doing better now , no SI/HI, lives with spouse \par daughter lives near by 10 minutes away \par \par Hypertensive -\par -saw cardio 9/2019 -Echo: LVEF 55% with mild MR and TR.\par Nuclear stress test: LVEF 55%; no ischemia or scar.\par -home readings 130-140/80 \par - compliant with medication and diet, no cp, sob, no palpitations. No dizzy spells.\par \par Hyperlipidemia --on atorvastatin 20 mg compliant with medications and diet,. Denies any muscle pain. \par \par Cva 2012 rt hemeparesis-- stopped going to [facility] minimal physical activity now , gained weight, used to socialize there , and helps her strengthen her body.  helping her - drives to [employer]. Does arthritis exercise. Walks with rollator now \par Gained weight \par All are senior citizens. Now walking with walker rollator as much as possible.- has balance issues - legs crumbled on her weight while getting out of bed last week no trauma to head -  helped her get up - now walking with walker no problem \par -daughter helps with IADLS \par - Lives with . @ daughters in [place] , \par \par pre dm -eats rice daily , no bread , potato and fruits , has decreased physical activity \par

## 2023-01-06 NOTE — ASSESSMENT
[FreeTextEntry1] : Dry cough - chronic- \par - get chest xray r/o infiltrate\par - start Tessalon pearls 100 po TID \par \par Hypertension -150/80 rechecked - missing  losartan 25 qd restart rx renewed , BMP next visit -cont amlodipine 10 qd \par - continue current medications, low sodium-DASH diet, Educated patient on avoiding canned food process food fast food, including 3-4 servings of fruits and vegetables a day.\par RTC 3 months check BP \par \par CKD stage 2 - SANDRA - resolved - cr 1.1 8/2022 \par UTI - recent admisiion for urosepsis 6/6/22 - saw nephro for Sandra -did CT Abdomen/ pelvis had shown right hydroureteronephrosis. Patient was started on ceftriaxone and admitted to medicine for sepsis, UTI and metabolic encephalopathy. Blood and urine cultures both with E.Coli sensitive to ceftriaxone. Creat was 2.5 on admission ( baseline was 1.2 mg/dl) after treatment of UTI, resolution of hydro and some fluids, she improved to her baseline creatinine\par Renal US from 6/8 22 showed resolution of hydronephrosis.\par \par BMI - 36 --> 34 \par -lost weight 20 lbs since last 3/2021 - \par - discussed caloric control , portion control , weight loss, increase physical activity\par \par Grief/ depression PHQ-9 score 6 mild \par  - lost son 11/2019 \par \par gait and balance disturbance- PT referral given - walking with rollators \par \par History of CVA with right hemiparesis -stable, patient trying to be independent as much as possible, walking with walker , continue current medication advice to be physically active as much as possible. advised to join [employer] programs- will need acces a ride help for transportation as next BUs stop is 2 blocks and cannot walk long distances \par -continue aspirin, educated patient to have a better blood pressure control.\par -lipitor increased to 40 -rtc 3 months redo LFT / lipids \par \par Hyperlipidemia--Lipitor increased to 40 3/2021  -rtc 3 months\par Controlled, continue current medications, check lipid levels\par Low-fat diet, avoid red meat, cheese, butter, peanuts and exercise daily for 40 minutes.\par \par lower back pain-stable, advised patient to lose weight, exercise daily \par \par Pre Dm- increase Physical activity and eat low carb diet , loose weight , check AIC \par \par Health maintenance \par Flu vaccination -1/6/23\par Colonoscope 6/2014 \par Mammogram 4/2021 Bi rad 2 \par Tetanus vaccine- 2014\par Pneumovax -2014\par Prevnar 13 - given 2015\par zostavax -pt will check with insurance for coverage and let me know next visit. \par pfizer 3/5/21, 3/19/21. \par

## 2023-01-09 LAB
25(OH)D3 SERPL-MCNC: 30.9 NG/ML
ALBUMIN SERPL ELPH-MCNC: 4.4 G/DL
ALP BLD-CCNC: 186 U/L
ALT SERPL-CCNC: 17 U/L
ANION GAP SERPL CALC-SCNC: 14 MMOL/L
AST SERPL-CCNC: 22 U/L
BASOPHILS # BLD AUTO: 0.05 K/UL
BASOPHILS NFR BLD AUTO: 0.6 %
BILIRUB SERPL-MCNC: 0.4 MG/DL
BUN SERPL-MCNC: 16 MG/DL
CALCIUM SERPL-MCNC: 9.9 MG/DL
CHLORIDE SERPL-SCNC: 103 MMOL/L
CHOLEST SERPL-MCNC: 151 MG/DL
CO2 SERPL-SCNC: 25 MMOL/L
CREAT SERPL-MCNC: 1.08 MG/DL
EGFR: 53 ML/MIN/1.73M2
EOSINOPHIL # BLD AUTO: 0.48 K/UL
EOSINOPHIL NFR BLD AUTO: 6.1 %
ESTIMATED AVERAGE GLUCOSE: 108 MG/DL
GLUCOSE SERPL-MCNC: 100 MG/DL
HBA1C MFR BLD HPLC: 5.4 %
HCT VFR BLD CALC: 40.7 %
HDLC SERPL-MCNC: 46 MG/DL
HGB BLD-MCNC: 12.5 G/DL
IMM GRANULOCYTES NFR BLD AUTO: 0.4 %
LDLC SERPL CALC-MCNC: 75 MG/DL
LYMPHOCYTES # BLD AUTO: 2.03 K/UL
LYMPHOCYTES NFR BLD AUTO: 25.7 %
MAN DIFF?: NORMAL
MCHC RBC-ENTMCNC: 26.9 PG
MCHC RBC-ENTMCNC: 30.7 GM/DL
MCV RBC AUTO: 87.7 FL
MONOCYTES # BLD AUTO: 0.61 K/UL
MONOCYTES NFR BLD AUTO: 7.7 %
NEUTROPHILS # BLD AUTO: 4.7 K/UL
NEUTROPHILS NFR BLD AUTO: 59.5 %
NONHDLC SERPL-MCNC: 105 MG/DL
PLATELET # BLD AUTO: 364 K/UL
POTASSIUM SERPL-SCNC: 4.6 MMOL/L
PROT SERPL-MCNC: 8 G/DL
RBC # BLD: 4.64 M/UL
RBC # FLD: 16.9 %
SODIUM SERPL-SCNC: 141 MMOL/L
TRIGL SERPL-MCNC: 146 MG/DL
TSH SERPL-ACNC: 2.33 UIU/ML
VIT B12 SERPL-MCNC: 1023 PG/ML
WBC # FLD AUTO: 7.9 K/UL

## 2023-01-10 ENCOUNTER — APPOINTMENT (OUTPATIENT)
Dept: RADIOLOGY | Facility: IMAGING CENTER | Age: 78
End: 2023-01-10
Payer: MEDICARE

## 2023-01-10 ENCOUNTER — OUTPATIENT (OUTPATIENT)
Dept: OUTPATIENT SERVICES | Facility: HOSPITAL | Age: 78
LOS: 1 days | End: 2023-01-10
Payer: MEDICARE

## 2023-01-10 ENCOUNTER — NON-APPOINTMENT (OUTPATIENT)
Age: 78
End: 2023-01-10

## 2023-01-10 DIAGNOSIS — R05.3 CHRONIC COUGH: ICD-10-CM

## 2023-01-10 PROCEDURE — 71046 X-RAY EXAM CHEST 2 VIEWS: CPT | Mod: 26

## 2023-01-10 PROCEDURE — 71046 X-RAY EXAM CHEST 2 VIEWS: CPT

## 2023-02-13 ENCOUNTER — RX RENEWAL (OUTPATIENT)
Age: 78
End: 2023-02-13

## 2023-04-19 ENCOUNTER — APPOINTMENT (OUTPATIENT)
Dept: INTERNAL MEDICINE | Facility: CLINIC | Age: 78
End: 2023-04-19
Payer: MEDICARE

## 2023-04-19 DIAGNOSIS — J37.0 CHRONIC LARYNGITIS: ICD-10-CM

## 2023-04-19 PROCEDURE — 99443: CPT | Mod: 95

## 2023-04-19 NOTE — PHYSICAL EXAM
[de-identified] : TEB. General: Well-developed well-nourished in no apparent distress. Appears mildly ill. \par Respiratory: Speaking in complete sentences, no respiratory distress noted.\par Neuro: No focal deficits noted.\par

## 2023-04-19 NOTE — ASSESSMENT
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 \par \par # viral syndrome with fatigue, thick phlegm\par Afebrile and able to replenish oral fluid. \par Recommended try Mucinex 600mg 1tab po bid for 1 week, Benzonatate 100mg 1cap tid prn for cough. \par Continue supportive care, rest, plenty of oral fluid intake. \par \par Patient was advised to call PCP for any worsening sx or if no resolution.\par

## 2023-04-19 NOTE — HISTORY OF PRESENT ILLNESS
[Home] : at home, [unfilled] , at the time of the visit. 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 Pt's  was by pt and pt wishes to share information. We tried multiple times for TEB via [employer] but it did not work. By Pt's agreement, we changed visit to TTM.\par \par \par Reviewed note by Dr. Puri, PCP 1/6/23 for CPE and Dr. Lemus, neph 8/2/22.\par Latest Chest xray 1/10/23: no acute pulmonary disease. \par Denied hx of asthma, COPD, smoking. \par Home COVID test negative. \par Pt says she has been very careful not to go to hospital or nursing home like last year. \par She goes to Gym 2 times weekly to see her  to enhance her leg strength. She walks using a cane or rolling walker. The gym members does not wear a mask any longer. She does not recall any possible exposure last week. Since last Thursday she developed very thick yellow phlegm that she had to spit out a lot. Denied fever, chills,CP, SOB, abdominal pain, n/v/c/d, urinary frequency, urine change, dysuria. She denied decreased activity tolerance. Tried Coricidine for HBP cold and flu. Reported home BP okay. Taking plenty of oral fluid with lemon.

## 2023-05-10 ENCOUNTER — APPOINTMENT (OUTPATIENT)
Dept: INTERNAL MEDICINE | Facility: CLINIC | Age: 78
End: 2023-05-10
Payer: MEDICARE

## 2023-05-10 VITALS
OXYGEN SATURATION: 98 % | SYSTOLIC BLOOD PRESSURE: 157 MMHG | TEMPERATURE: 98.2 F | BODY MASS INDEX: 36.32 KG/M2 | HEIGHT: 65 IN | HEART RATE: 51 BPM | WEIGHT: 218 LBS | DIASTOLIC BLOOD PRESSURE: 81 MMHG

## 2023-05-10 VITALS — SYSTOLIC BLOOD PRESSURE: 130 MMHG | DIASTOLIC BLOOD PRESSURE: 80 MMHG

## 2023-05-10 DIAGNOSIS — I10 ESSENTIAL (PRIMARY) HYPERTENSION: ICD-10-CM

## 2023-05-10 DIAGNOSIS — R05.9 COUGH, UNSPECIFIED: ICD-10-CM

## 2023-05-10 DIAGNOSIS — K21.9 GASTRO-ESOPHAGEAL REFLUX DISEASE W/OUT ESOPHAGITIS: ICD-10-CM

## 2023-05-10 PROCEDURE — 99214 OFFICE O/P EST MOD 30 MIN: CPT

## 2023-05-10 NOTE — ASSESSMENT
[FreeTextEntry1] : Dry to mucoid cough - ? gerd -improving with worse s/s at night \par  -trial of Omeprazole 40 30 min before breakfast 2 weeks TEB in 2 weeks \par - continue Tessalon pearls 100 po TID\par \par GERD \par -start omeprazole 40 po daily 30 minutes prior to breakfast 1 month trial \par -Educated patient lifestyle modification, advice to avoid fried food, greasy oily and spicy foods, avoid tomato, orange, lemon , or caffeinated beverages.\par -Avoid reclining upto 3 hours after meals\par -Followup in 3 months if no improvement consider EGD\par \par Hypertension -130/80 rechecked - on losartan 25 qd, cont amlodipine 10 qd \par - continue current medications, low sodium-DASH diet, Educated patient on avoiding canned food process food fast food, including 3-4 servings of fruits and vegetables a day.\par RTC 3 months check BP \par \par CKD stage 2 - SANDRA - resolved - cr 1.1 8/2022 \par UTI - recent admisiion for urosepsis 6/6/22 - saw nephro for Sandra -did CT Abdomen/ pelvis had shown right hydroureteronephrosis. Patient was started on ceftriaxone and admitted to medicine for sepsis, UTI and metabolic encephalopathy. Blood and urine cultures both with E.Coli sensitive to ceftriaxone. Creat was 2.5 on admission ( baseline was 1.2 mg/dl) after treatment of UTI, resolution of hydro and some fluids, she improved to her baseline creatinine\par Renal US from 6/8 22 showed resolution of hydronephrosis.\par \par BMI - 36  \par - discussed caloric control , portion control , weight loss, increase physical activity\par \par Grief/ depression PHQ-9 score 6 mild \par  - lost son 11/2019 \par \par gait and balance disturbance- PT referral given - walking with rollators \par \par History of CVA with right hemiparesis -stable, patient trying to be independent as much as possible, walking with walker , continue current medication advice to be physically active as much as possible. advised to join [employer] programs- will need acces a ride help for transportation as next BUs stop is 2 blocks and cannot walk long distances \par -continue aspirin, educated patient to have a better blood pressure control.\par -lipitor increased to 40 -rtc 3 months redo LFT / lipids \par \par Hyperlipidemia--Lipitor increased to 40 3/2021 -rtc 3 months\par Controlled, continue current medications, check lipid levels\par Low-fat diet, avoid red meat, cheese, butter, peanuts and exercise daily for 40 minutes.\par \par lower back pain-stable, advised patient to lose weight, exercise daily \par \par Pre Dm- increase Physical activity and eat low carb diet , loose weight , check AIC \par \par Health maintenance \par Flu vaccination -1/6/23\par Colonoscope 6/2014 \par Mammogram 4/2021 Bi rad 2 \par Tetanus vaccine- 2014\par Pneumovax -2014\par Prevnar 13 - given 2015\par zostavax -pt will check with insurance for coverage and let me know next visit. \par pfizer 3/5/21, 3/19/21. \par

## 2023-05-10 NOTE — REVIEW OF SYSTEMS
[Cough] : cough [Negative] : Gastrointestinal [Fever] : no fever [Chills] : no chills [Nasal Discharge] : no nasal discharge [Sore Throat] : no sore throat [Postnasal Drip] : no postnasal drip [Wheezing] : no wheezing

## 2023-05-10 NOTE — HISTORY OF PRESENT ILLNESS
[Other: _____] : [unfilled] [de-identified] : 76 yo lady with CVA with hemiparesis on the right (uses walker or cane at home and walker when out), hypertension, hyperlipidemia, back pain, constipation seen for f/u \par \par had bad cold 4/19/ 23 saw MD KIMBROUGH - - gave her mucinex and Benzonate and cough is still mucusy - wanted to get checked \par -No runny nose , no stuffyness, feels like she needs to clear her throat more, at times with coughing its only dry , cough a lot at night using 2 pillows \par \par hx UTI - recent admission for urosepsis 6/6/22 - saw nephro for Sandra -did CT Abdomen/ pelvis had shown right hydroureteronephrosis. Patient was started on ceftriaxone and admitted to medicine for sepsis, UTI and metabolic encephalopathy. Blood and urine cultures both with E.Coli sensitive to ceftriaxone. Creat was 2.5 on admission ( baseline was 1.2 mg/dl) after treatment of UTI, resolution of hydro and some fluids, she improved to her baseline creatinine\par Renal US from 6/8 22 showed resolution of hydronephrosis. \par \par Grief / depression -Lost son 11/2019 - commited Suicide- doing better now , no SI/HI, lives with spouse \par daughter lives near by 10 minutes away \par \par Hypertensive -\par -saw cardio 9/2019 -Echo: LVEF 55% with mild MR and TR.\par Nuclear stress test: LVEF 55%; no ischemia or scar.\par -home readings 130-140/80 \par - compliant with medication and diet, no cp, sob, no palpitations. No dizzy spells.\par \par Hyperlipidemia --on atorvastatin 20 mg compliant with medications and diet,. Denies any muscle pain. \par \par Cva 2012 rt hemeparesis-- stopped going to [facility] minimal physical activity now , gained weight, used to socialize there , and helps her strengthen her body.  helping her - drives to [employer]. Does arthritis exercise. Walks with rollator now \par Gained weight \par All are senior citizens. Now walking with walker rollator as much as possible.- has balance issues - legs crumbled on her weight while getting out of bed last week no trauma to head -  helped her get up - now walking with walker no problem \par -daughter helps with IADLS \par - Lives with . @ daughters in [place] , \par \par pre dm -eats rice daily , no bread , potato and fruits , has decreased physical activity \par

## 2023-08-14 ENCOUNTER — APPOINTMENT (OUTPATIENT)
Dept: INTERNAL MEDICINE | Facility: CLINIC | Age: 78
End: 2023-08-14
Payer: MEDICARE

## 2023-08-14 DIAGNOSIS — R26.89 OTHER ABNORMALITIES OF GAIT AND MOBILITY: ICD-10-CM

## 2023-08-14 DIAGNOSIS — G81.91 HEMIPLEGIA, UNSPECIFIED AFFECTING RIGHT DOMINANT SIDE: ICD-10-CM

## 2023-08-14 PROCEDURE — 99442: CPT | Mod: 95

## 2023-08-14 RX ORDER — BENZONATATE 100 MG/1
100 CAPSULE ORAL
Qty: 60 | Refills: 0 | Status: COMPLETED | COMMUNITY
Start: 2023-01-06 | End: 2023-08-14

## 2023-08-14 RX ORDER — GUAIFENESIN 600 MG/1
600 TABLET, EXTENDED RELEASE ORAL
Qty: 14 | Refills: 0 | Status: COMPLETED | COMMUNITY
Start: 2023-04-19 | End: 2023-08-14

## 2023-08-14 NOTE — END OF VISIT
Follow up with your primary regarding your elevated cholesterol level.  You will need an updated ECHO and STRESS ECHO August 2017.   [Time Spent: ___ minutes] : I have spent [unfilled] minutes of time on the encounter.

## 2023-08-14 NOTE — HISTORY OF PRESENT ILLNESS
[Home] : at home, [unfilled] , at the time of the visit. [Medical Office: ([facility])___] : at the medical office located in  [Verbal consent obtained from patient] : the patient, [unfilled] [de-identified] : 76 yo lady with CVA with hemiparesis on the right (uses walker or cane at home and walker when out), hypertension, hyperlipidemia, back pain, constipation seen for f/u  daughter visists 2 x a week to check if she needs anything   rx renewed for amlodipien and atorvastatin  -doing better - home with spouse  -no s/s , no complaints  -finished PT - goes to gym in [place] - doing exercises to balance and has a Trainer - Maty shahid - 2 x a week tue and thursday - for 1 hrs - building strengthening  work in her backyar doing planting   had bad cold 4/19/ 23 saw MD KIMBROUGH - - resolved now   Hypertensive -doing b maggy at home on amlodipine 10 and losartan 100 home readings 130/80 -120 /85  -saw cardio 9/2019 -Echo: LVEF 55% with mild MR and TR. Nuclear stress test: LVEF 55%; no ischemia or scar. - compliant with medication and diet, no cp, sob, no palpitations. No dizzy spells.  Hyperlipidemia --on atorvastatin 20 mg compliant with medications and diet,. Denies any muscle pain.   Cva 2012 rt hemeparesis-- stopped going to [facility] minimal physical activity now , gained weight, used to socialize there , and helps her strengthen her body.  helping her - drives to appt. Does arthritis exercise. Walks with rollator now  Gained weight  All are senior citizens. Now walking with walker rollator as much as possible.- has balance issues - legs crumbled on her weight while getting out of bed last week no trauma to head -  helped her get up - now walking with walker no problem  -daughter helps with IADLS  - Lives with . @ [place] in [place] ,   pre dm -eats rice daily , no bread , potato and fruits , has decreased physical activity

## 2023-12-27 ENCOUNTER — APPOINTMENT (OUTPATIENT)
Dept: INTERNAL MEDICINE | Facility: CLINIC | Age: 78
End: 2023-12-27

## 2023-12-29 ENCOUNTER — APPOINTMENT (OUTPATIENT)
Dept: INTERNAL MEDICINE | Facility: CLINIC | Age: 78
End: 2023-12-29

## 2023-12-31 ENCOUNTER — EMERGENCY (EMERGENCY)
Facility: HOSPITAL | Age: 78
LOS: 0 days | Discharge: HOME | End: 2023-12-31
Attending: STUDENT IN AN ORGANIZED HEALTH CARE EDUCATION/TRAINING PROGRAM
Payer: MEDICARE

## 2023-12-31 VITALS
DIASTOLIC BLOOD PRESSURE: 92 MMHG | RESPIRATION RATE: 16 BRPM | HEIGHT: 65 IN | HEART RATE: 114 BPM | WEIGHT: 259.93 LBS | OXYGEN SATURATION: 100 % | TEMPERATURE: 98 F | SYSTOLIC BLOOD PRESSURE: 146 MMHG

## 2023-12-31 VITALS
TEMPERATURE: 98 F | SYSTOLIC BLOOD PRESSURE: 168 MMHG | OXYGEN SATURATION: 98 % | DIASTOLIC BLOOD PRESSURE: 94 MMHG | RESPIRATION RATE: 18 BRPM | HEART RATE: 94 BPM

## 2023-12-31 DIAGNOSIS — R29.898 OTHER SYMPTOMS AND SIGNS INVOLVING THE MUSCULOSKELETAL SYSTEM: ICD-10-CM

## 2023-12-31 DIAGNOSIS — S00.03XA CONTUSION OF SCALP, INITIAL ENCOUNTER: ICD-10-CM

## 2023-12-31 DIAGNOSIS — R41.0 DISORIENTATION, UNSPECIFIED: ICD-10-CM

## 2023-12-31 DIAGNOSIS — I67.1 CEREBRAL ANEURYSM, NONRUPTURED: ICD-10-CM

## 2023-12-31 DIAGNOSIS — R26.2 DIFFICULTY IN WALKING, NOT ELSEWHERE CLASSIFIED: ICD-10-CM
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
## 2023-12-31 DIAGNOSIS — Z86.73 PERSONAL HISTORY OF TRANSIENT ISCHEMIC ATTACK (TIA), AND CEREBRAL INFARCTION WITHOUT RESIDUAL DEFICITS: ICD-10-CM

## 2023-12-31 DIAGNOSIS — I10 ESSENTIAL (PRIMARY) HYPERTENSION: ICD-10-CM

## 2023-12-31 LAB
ALBUMIN SERPL ELPH-MCNC: 3.6 G/DL — SIGNIFICANT CHANGE UP (ref 3.3–5)
ALBUMIN SERPL ELPH-MCNC: 3.6 G/DL — SIGNIFICANT CHANGE UP (ref 3.3–5)
ALP SERPL-CCNC: 179 U/L — HIGH (ref 40–120)
ALP SERPL-CCNC: 179 U/L — HIGH (ref 40–120)
ALT FLD-CCNC: 23 U/L — SIGNIFICANT CHANGE UP (ref 12–78)
ALT FLD-CCNC: 23 U/L — SIGNIFICANT CHANGE UP (ref 12–78)
ANION GAP SERPL CALC-SCNC: 7 MMOL/L — SIGNIFICANT CHANGE UP (ref 5–17)
ANION GAP SERPL CALC-SCNC: 7 MMOL/L — SIGNIFICANT CHANGE UP (ref 5–17)
APPEARANCE UR: ABNORMAL
APPEARANCE UR: ABNORMAL
APTT BLD: 24 SEC — LOW (ref 24.5–35.6)
APTT BLD: 24 SEC — LOW (ref 24.5–35.6)
AST SERPL-CCNC: 14 U/L — LOW (ref 15–37)
AST SERPL-CCNC: 14 U/L — LOW (ref 15–37)
BACTERIA # UR AUTO: ABNORMAL /HPF
BACTERIA # UR AUTO: ABNORMAL /HPF
BASOPHILS # BLD AUTO: 0.08 K/UL — SIGNIFICANT CHANGE UP (ref 0–0.2)
BASOPHILS # BLD AUTO: 0.08 K/UL — SIGNIFICANT CHANGE UP (ref 0–0.2)
BASOPHILS NFR BLD AUTO: 0.9 % — SIGNIFICANT CHANGE UP (ref 0–2)
BASOPHILS NFR BLD AUTO: 0.9 % — SIGNIFICANT CHANGE UP (ref 0–2)
BILIRUB SERPL-MCNC: 0.4 MG/DL — SIGNIFICANT CHANGE UP (ref 0.2–1.2)
BILIRUB SERPL-MCNC: 0.4 MG/DL — SIGNIFICANT CHANGE UP (ref 0.2–1.2)
BILIRUB UR-MCNC: NEGATIVE — SIGNIFICANT CHANGE UP
BILIRUB UR-MCNC: NEGATIVE — SIGNIFICANT CHANGE UP
BUN SERPL-MCNC: 14 MG/DL — SIGNIFICANT CHANGE UP (ref 7–23)
BUN SERPL-MCNC: 14 MG/DL — SIGNIFICANT CHANGE UP (ref 7–23)
CALCIUM SERPL-MCNC: 9.4 MG/DL — SIGNIFICANT CHANGE UP (ref 8.5–10.1)
CALCIUM SERPL-MCNC: 9.4 MG/DL — SIGNIFICANT CHANGE UP (ref 8.5–10.1)
CHLORIDE SERPL-SCNC: 106 MMOL/L — SIGNIFICANT CHANGE UP (ref 96–108)
CHLORIDE SERPL-SCNC: 106 MMOL/L — SIGNIFICANT CHANGE UP (ref 96–108)
CO2 SERPL-SCNC: 28 MMOL/L — SIGNIFICANT CHANGE UP (ref 22–31)
CO2 SERPL-SCNC: 28 MMOL/L — SIGNIFICANT CHANGE UP (ref 22–31)
COLOR SPEC: SIGNIFICANT CHANGE UP
COLOR SPEC: SIGNIFICANT CHANGE UP
CREAT SERPL-MCNC: 1.26 MG/DL — SIGNIFICANT CHANGE UP (ref 0.5–1.3)
CREAT SERPL-MCNC: 1.26 MG/DL — SIGNIFICANT CHANGE UP (ref 0.5–1.3)
DIFF PNL FLD: NEGATIVE — SIGNIFICANT CHANGE UP
DIFF PNL FLD: NEGATIVE — SIGNIFICANT CHANGE UP
EGFR: 43 ML/MIN/1.73M2 — LOW
EGFR: 43 ML/MIN/1.73M2 — LOW
EOSINOPHIL # BLD AUTO: 0.28 K/UL — SIGNIFICANT CHANGE UP (ref 0–0.5)
EOSINOPHIL # BLD AUTO: 0.28 K/UL — SIGNIFICANT CHANGE UP (ref 0–0.5)
EOSINOPHIL NFR BLD AUTO: 3.1 % — SIGNIFICANT CHANGE UP (ref 0–6)
EOSINOPHIL NFR BLD AUTO: 3.1 % — SIGNIFICANT CHANGE UP (ref 0–6)
EPI CELLS # UR: PRESENT
EPI CELLS # UR: PRESENT
GLUCOSE SERPL-MCNC: 142 MG/DL — HIGH (ref 70–99)
GLUCOSE SERPL-MCNC: 142 MG/DL — HIGH (ref 70–99)
GLUCOSE UR QL: NEGATIVE MG/DL — SIGNIFICANT CHANGE UP
GLUCOSE UR QL: NEGATIVE MG/DL — SIGNIFICANT CHANGE UP
HCT VFR BLD CALC: 42.4 % — SIGNIFICANT CHANGE UP (ref 34.5–45)
HCT VFR BLD CALC: 42.4 % — SIGNIFICANT CHANGE UP (ref 34.5–45)
HGB BLD-MCNC: 13.8 G/DL — SIGNIFICANT CHANGE UP (ref 11.5–15.5)
HGB BLD-MCNC: 13.8 G/DL — SIGNIFICANT CHANGE UP (ref 11.5–15.5)
IMM GRANULOCYTES NFR BLD AUTO: 0.8 % — SIGNIFICANT CHANGE UP (ref 0–0.9)
IMM GRANULOCYTES NFR BLD AUTO: 0.8 % — SIGNIFICANT CHANGE UP (ref 0–0.9)
INR BLD: 0.94 RATIO — SIGNIFICANT CHANGE UP (ref 0.85–1.18)
INR BLD: 0.94 RATIO — SIGNIFICANT CHANGE UP (ref 0.85–1.18)
KETONES UR-MCNC: NEGATIVE MG/DL — SIGNIFICANT CHANGE UP
KETONES UR-MCNC: NEGATIVE MG/DL — SIGNIFICANT CHANGE UP
LEUKOCYTE ESTERASE UR-ACNC: NEGATIVE — SIGNIFICANT CHANGE UP
LEUKOCYTE ESTERASE UR-ACNC: NEGATIVE — SIGNIFICANT CHANGE UP
LYMPHOCYTES # BLD AUTO: 1.75 K/UL — SIGNIFICANT CHANGE UP (ref 1–3.3)
LYMPHOCYTES # BLD AUTO: 1.75 K/UL — SIGNIFICANT CHANGE UP (ref 1–3.3)
LYMPHOCYTES # BLD AUTO: 19.3 % — SIGNIFICANT CHANGE UP (ref 13–44)
LYMPHOCYTES # BLD AUTO: 19.3 % — SIGNIFICANT CHANGE UP (ref 13–44)
MCHC RBC-ENTMCNC: 27.1 PG — SIGNIFICANT CHANGE UP (ref 27–34)
MCHC RBC-ENTMCNC: 27.1 PG — SIGNIFICANT CHANGE UP (ref 27–34)
MCHC RBC-ENTMCNC: 32.5 G/DL — SIGNIFICANT CHANGE UP (ref 32–36)
MCHC RBC-ENTMCNC: 32.5 G/DL — SIGNIFICANT CHANGE UP (ref 32–36)
MCV RBC AUTO: 83.3 FL — SIGNIFICANT CHANGE UP (ref 80–100)
MCV RBC AUTO: 83.3 FL — SIGNIFICANT CHANGE UP (ref 80–100)
MONOCYTES # BLD AUTO: 0.78 K/UL — SIGNIFICANT CHANGE UP (ref 0–0.9)
MONOCYTES # BLD AUTO: 0.78 K/UL — SIGNIFICANT CHANGE UP (ref 0–0.9)
MONOCYTES NFR BLD AUTO: 8.6 % — SIGNIFICANT CHANGE UP (ref 2–14)
MONOCYTES NFR BLD AUTO: 8.6 % — SIGNIFICANT CHANGE UP (ref 2–14)
NEUTROPHILS # BLD AUTO: 6.13 K/UL — SIGNIFICANT CHANGE UP (ref 1.8–7.4)
NEUTROPHILS # BLD AUTO: 6.13 K/UL — SIGNIFICANT CHANGE UP (ref 1.8–7.4)
NEUTROPHILS NFR BLD AUTO: 67.3 % — SIGNIFICANT CHANGE UP (ref 43–77)
NEUTROPHILS NFR BLD AUTO: 67.3 % — SIGNIFICANT CHANGE UP (ref 43–77)
NITRITE UR-MCNC: NEGATIVE — SIGNIFICANT CHANGE UP
NITRITE UR-MCNC: NEGATIVE — SIGNIFICANT CHANGE UP
NRBC # BLD: 0 /100 WBCS — SIGNIFICANT CHANGE UP (ref 0–0)
NRBC # BLD: 0 /100 WBCS — SIGNIFICANT CHANGE UP (ref 0–0)
PH UR: 6 — SIGNIFICANT CHANGE UP (ref 5–8)
PH UR: 6 — SIGNIFICANT CHANGE UP (ref 5–8)
PLATELET # BLD AUTO: 323 K/UL — SIGNIFICANT CHANGE UP (ref 150–400)
PLATELET # BLD AUTO: 323 K/UL — SIGNIFICANT CHANGE UP (ref 150–400)
POTASSIUM SERPL-MCNC: 3.3 MMOL/L — LOW (ref 3.5–5.3)
POTASSIUM SERPL-MCNC: 3.3 MMOL/L — LOW (ref 3.5–5.3)
POTASSIUM SERPL-SCNC: 3.3 MMOL/L — LOW (ref 3.5–5.3)
POTASSIUM SERPL-SCNC: 3.3 MMOL/L — LOW (ref 3.5–5.3)
PROT SERPL-MCNC: 7.4 GM/DL — SIGNIFICANT CHANGE UP (ref 6–8.3)
PROT SERPL-MCNC: 7.4 GM/DL — SIGNIFICANT CHANGE UP (ref 6–8.3)
PROT UR-MCNC: 300 MG/DL
PROT UR-MCNC: 300 MG/DL
PROTHROM AB SERPL-ACNC: 11.3 SEC — SIGNIFICANT CHANGE UP (ref 9.5–13)
PROTHROM AB SERPL-ACNC: 11.3 SEC — SIGNIFICANT CHANGE UP (ref 9.5–13)
RBC # BLD: 5.09 M/UL — SIGNIFICANT CHANGE UP (ref 3.8–5.2)
RBC # BLD: 5.09 M/UL — SIGNIFICANT CHANGE UP (ref 3.8–5.2)
RBC # FLD: 16.3 % — HIGH (ref 10.3–14.5)
RBC # FLD: 16.3 % — HIGH (ref 10.3–14.5)
RBC CASTS # UR COMP ASSIST: 2 /HPF — SIGNIFICANT CHANGE UP (ref 0–4)
RBC CASTS # UR COMP ASSIST: 2 /HPF — SIGNIFICANT CHANGE UP (ref 0–4)
SODIUM SERPL-SCNC: 141 MMOL/L — SIGNIFICANT CHANGE UP (ref 135–145)
SODIUM SERPL-SCNC: 141 MMOL/L — SIGNIFICANT CHANGE UP (ref 135–145)
SP GR SPEC: 1.03 — SIGNIFICANT CHANGE UP (ref 1–1.03)
SP GR SPEC: 1.03 — SIGNIFICANT CHANGE UP (ref 1–1.03)
UROBILINOGEN FLD QL: 1 MG/DL — SIGNIFICANT CHANGE UP (ref 0.2–1)
UROBILINOGEN FLD QL: 1 MG/DL — SIGNIFICANT CHANGE UP (ref 0.2–1)
WBC # BLD: 9.09 K/UL — SIGNIFICANT CHANGE UP (ref 3.8–10.5)
WBC # BLD: 9.09 K/UL — SIGNIFICANT CHANGE UP (ref 3.8–10.5)
WBC # FLD AUTO: 9.09 K/UL — SIGNIFICANT CHANGE UP (ref 3.8–10.5)
WBC # FLD AUTO: 9.09 K/UL — SIGNIFICANT CHANGE UP (ref 3.8–10.5)
WBC UR QL: 4 /HPF — SIGNIFICANT CHANGE UP (ref 0–5)
WBC UR QL: 4 /HPF — SIGNIFICANT CHANGE UP (ref 0–5)

## 2023-12-31 PROCEDURE — 70450 CT HEAD/BRAIN W/O DYE: CPT | Mod: 26,MA,XU

## 2023-12-31 PROCEDURE — 72125 CT NECK SPINE W/O DYE: CPT | Mod: 26,MA

## 2023-12-31 PROCEDURE — 99285 EMERGENCY DEPT VISIT HI MDM: CPT

## 2023-12-31 PROCEDURE — 70496 CT ANGIOGRAPHY HEAD: CPT | Mod: 26,MA

## 2023-12-31 PROCEDURE — 70498 CT ANGIOGRAPHY NECK: CPT | Mod: 26,MA

## 2023-12-31 PROCEDURE — 71250 CT THORAX DX C-: CPT | Mod: 26,MA

## 2023-12-31 PROCEDURE — 73590 X-RAY EXAM OF LOWER LEG: CPT | Mod: 26,LT

## 2023-12-31 PROCEDURE — 74176 CT ABD & PELVIS W/O CONTRAST: CPT | Mod: 26,MA

## 2023-12-31 PROCEDURE — 93010 ELECTROCARDIOGRAM REPORT: CPT

## 2023-12-31 RX ORDER — SODIUM CHLORIDE 9 MG/ML
1000 INJECTION INTRAMUSCULAR; INTRAVENOUS; SUBCUTANEOUS ONCE
Refills: 0 | Status: COMPLETED | OUTPATIENT
Start: 2023-12-31 | End: 2023-12-31

## 2023-12-31 RX ADMIN — SODIUM CHLORIDE 1000 MILLILITER(S): 9 INJECTION INTRAMUSCULAR; INTRAVENOUS; SUBCUTANEOUS at 14:23

## 2023-12-31 NOTE — ED ADULT TRIAGE NOTE - WEIGHT METHOD
Yeyo Bradley (:  1969) is a 46 y.o. female, here for evaluation of the following chief complaint(s):    Established New Doctor (Colonoscopy scheduled for 2023)      ASSESSMENT/PLAN:  1. Encounter to establish care  2. Intractable chronic migraine without aura and without status migrainosus  -sees [address] [place] [address] [address] Neurology Dr. Shaun Pappas, is getting botox inj q3 months   -gets MRI q5y (mother had brain tumor glioblastoma, sister had brain aneurysm  -does toradol inj prn (~6/m)  -uses nurtac prn (not helpful)  -Tried Imitrex, rizatriptan, eletriptan, zolmitriptan, Ubrelvy, amitriptyline and gabapentin in the past per neurology note. Per patient tried Ajovy injections. States Fiorinal with Codeine is effective. We had discussion that she should try to further minimize use of this. Tizanidine helps her neck pain. 3. Psychophysiological insomnia  Seroquel is effective, also with Melatonin. She has been able to manage off benzos. 4. Anxiety and depression  Overall well controlled  -continue Wellbutrin 450 mg and Cymbalta 30 mg     Allergic rhinitis  -uses Singulair, nasal Flovent  -hx of allergy shots        Return in about 6 months (around 2023). SUBJECTIVE/OBJECTIVE:  HPI  Patient is here to establish care. We reviewed her chart. She was having a lot of difficulty with insomnia, depression and anxiety. She saw a therapist to help with her insomnia. She understands about the change in policy on benzodiazepines. She has been able to manage without them. Migraine headaches continue to be recurrent. She has established with [facility]. The neurologist prescribes all of her medication except for Fiorinal with codeine. We discussed that she takes more than I would expect given the effectiveness of the other medications and Botox injections she is now taking. Colonsocopy schedule     We discussed that her weight is up.   She thinks it is due to not working out during  but now she is working out with a [employer]. PDMP Monitoring:    Last PDMP Michael Glover as Reviewed:  Review User Review Instant Review Result   Mc Kay 10/21/2022 12:47 PM Reviewed PDMP [1]     Last Controlled Substance Monitoring Documentation      [address] Office Visit from 3/27/2019 in [facility] The Prescription Monitoring Report for this patient was reviewed today. filed at 03/27/2019 4081   Periodic Controlled Substance Monitoring Possible medication side effects, risk of tolerance/dependence & alternative treatments discussed., No signs of potential drug abuse or diversion identified: otherwise, see note documentation filed at 03/27/2019 5725          Urine Drug Screenings (1 yr)    No resulted procedures found.        Medication Contract and Consent for Opioid Use Documents Filed        No documents found                    The 10-year ASCVD risk score (Ihsan PATRICK, et al., 2019) is: 1.7%    Values used to calculate the score:      Age: 46 years      Sex: Female      Is Non- : No      Diabetic: No      Tobacco smoker: No      Systolic Blood Pressure: 139 mmHg      Is BP treated: No      HDL Cholesterol: 70 mg/dL      Total Cholesterol: 235 mg/dL    Review of Systems    Past Medical History:   Diagnosis Date    Allergic rhinitis, cause unspecified     Chronic    Anxiety state, unspecified     Cancer ([address]     basal under left eye    CVA (cerebral vascular accident) ([address] 2020    retinal artery occlusion, takes aspirin - right eye - field of vision affected    Depressive disorder, not elsewhere classified     Endometriosis 2005    Ganglion cyst 2009    HA (headache)     Hemorrhage of rectum and anus     Insomnia, unspecified     Migraine, unspecified, without mention of intractable migraine without mention of status migrainosus     Chronic    Osteopenia     Screening mammogram 12/02/2009    Benign    Sprain of neck     Bilateral    Unspecified constipation Unspecified thrombosed hemorrhoids        Current Outpatient Medications   Medication Sig Dispense Refill    butalbital-aspirin-caffeine-codeine (FIORINAL WITH CODEINE) -92-30 MG capsule Take 1 capsule by mouth daily as needed. progesterone (PROMETRIUM) 100 MG CAPS capsule Take 100 mg by mouth daily      melatonin 5 MG TABS tablet Take 1 tablet by mouth nightly as needed (insomnia)  0    buPROPion (WELLBUTRIN XL) 300 MG extended release tablet Take 1 tablet by mouth every morning 90 tablet 0    buPROPion (WELLBUTRIN XL) 150 MG extended release tablet Take 1 tablet by mouth every morning With the 300 mg tab 90 tablet 0    QUEtiapine (SEROQUEL) 100 MG tablet TAKE 2 TO 3 TABLETS BY MOUTH EVERY NIGHT 90 tablet 0    DULoxetine (CYMBALTA) 30 MG extended release capsule Take 1 capsule by mouth daily 30 capsule 0    aspirin 81 MG EC tablet Take 81 mg by mouth daily      ketorolac (TORADOL) 30 MG/ML injection Inject 1 mL IM PRN for migraine, not to exceed 4mL in 1 month 4 mL 5    NEEDLE, DISP, 25 G 25G X 1-1/4\" MISC Use for Toradol injection 4 each 0    tiZANidine (ZANAFLEX) 4 MG tablet       NURTEC 75 MG TBDP       valACYclovir (VALTREX) 1 g tablet       SYRINGE-NEEDLE, DISP, 3 ML (B-D 3CC LUER-MACIE SYR 25GX1\") 25G X 1\" 3 ML MISC USE FOR TORADOL INJECTION 100 each 0    montelukast (SINGULAIR) 10 MG tablet Take 10 mg by mouth nightly. fluticasone (FLOVENT HFA) 44 MCG/ACT inhaler Inhale 1 puff into the lungs 2 times daily. promethazine (PHENERGAN) 25 MG tablet Take 25 mg by mouth daily as needed      hydrocortisone (ANUSOL-HC) 2.5 % CREA rectal cream Apply up to tid (Patient not taking: No sig reported) 1 Tube 1     No current facility-administered medications for this visit. Physical Exam  Vitals reviewed. Constitutional:       General: She is not in acute distress. HENT:      Head: Normocephalic and atraumatic. Cardiovascular:      Rate and Rhythm: Rhythm irregular.       Comments: Ho pac's, had cardio eval  Pulmonary:      Effort: Pulmonary effort is normal.      Breath sounds: Normal breath sounds. Musculoskeletal:      Right lower leg: No edema. Left lower leg: No edema. Neurological:      General: No focal deficit present. Mental Status: She is alert and oriented to person, place, and time. Psychiatric:         Mood and Affect: Mood normal.       On this date 10/21/2022 I have spent 30 minutes reviewing previous notes, test results and face to face with the patient discussing the diagnosis and importance of compliance with the treatment plan as well as documenting on the day of the visit. This note was generated completely or in part utilizing Dragon dictation speech recognition software. Occasionally, words are mistranscribed and despite editing, the text may contain inaccuracies due to incorrect word recognition. If further clarification is needed please contact the office at [phone]          An electronic signature was used to authenticate this note.     --Ian Mcclelland MD stated

## 2023-12-31 NOTE — ED PROVIDER NOTE - NSFOLLOWUPINSTRUCTIONS_ED_ALL_ED_FT
Follow up with neurosurgery for aneurysm.    WHAT YOU NEED TO KNOW:    Fall prevention includes ways to make your home and other areas safer. It also includes ways you can move more carefully to prevent a fall. Health conditions that cause changes in your blood pressure, vision, or muscle strength and coordination may increase your risk for falls. Medicines may also increase your risk for falls if they make you dizzy, weak, or sleepy.     DISCHARGE INSTRUCTIONS:    Call 911 or have someone else call if:     You have fallen and are unconscious.      You have fallen and cannot move part of your body.    Contact your healthcare provider if:     You have fallen and have pain or a headache.      You have questions or concerns about your condition or care.    Fall prevention tips:     Stand or sit up slowly. This may help you keep your balance and prevent falls.      Use assistive devices as directed. Your healthcare provider may suggest that you use a cane or walker to help you keep your balance. You may need to have grab bars put in your bathroom near the toilet or in the shower.      Wear shoes that fit well and have soles that . Wear shoes both inside and outside. Use slippers with good . Do not wear shoes with high heels.      Wear a personal alarm. This is a device that allows you to call 911 if you fall and need help. Ask your healthcare provider for more information.      Stay active. Exercise can help strengthen your muscles and improve your balance. Your healthcare provider may recommend water aerobics or walking. He or she may also recommend physical therapy to improve your coordination. Never start an exercise program without talking to your healthcare provider first.       Manage your medical conditions. Keep all appointments with your healthcare providers. Visit your eye doctor as directed.    Home safety tips:     Add items to prevent falls in the bathroom. Put nonslip strips on your bath or shower floor to prevent you from slipping. Use a bath mat if you do not have carpet in the bathroom. This will prevent you from falling when you step out of the bath or shower. Use a shower seat so you do not need to stand while you shower. Sit on the toilet or a chair in your bathroom to dry yourself and put on clothing. This will prevent you from losing your balance from drying or dressing yourself while you are standing.       Keep paths clear. Remove books, shoes, and other objects from walkways and stairs. Place cords for telephones and lamps out of the way so that you do not need to walk over them. Tape them down if you cannot move them. Remove small rugs. If you cannot remove a rug, secure it with double-sided tape. This will prevent you from tripping.       Install bright lights in your home. Use night lights to help light paths to the bathroom or kitchen. Always turn on the light before you start walking.      Keep items you use often on shelves within reach. Do not use a step stool to help you reach an item.      Paint or place reflective tape on the edges of your stairs. This will help you see the stairs better.    Follow up with your healthcare provider as directed: Write down your questions so you remember to ask them during your visits.

## 2023-12-31 NOTE — ED PROVIDER NOTE - CARE PLAN
Principal Discharge DX:	Fall  Secondary Diagnosis:	Scalp hematoma  Secondary Diagnosis:	Brain aneurysm   1

## 2023-12-31 NOTE — ED PROVIDER NOTE - PATIENT PORTAL LINK FT
You can access the FollowMyHealth Patient Portal offered by [facility] by registering at the following website: [email or web address]. By joining [employer]’s FollowMyHealth portal, you will also be able to view your health information using other applications (apps) compatible with our system. You can access the FollowMyHealth Patient Portal offered by [facility] by registering at the following website: [email or web address]. By joining [employer]’s FollowMyHealth portal, you will also be able to view your health information using other applications (apps) compatible with our system.

## 2023-12-31 NOTE — ED PROVIDER NOTE - PROVIDER TOKENS
PROVIDER:[TOKEN:[[id number]],FOLLOWUP:[1-3 Days]] PROVIDER:[TOKEN:[[id number]],FOLLOWUP:[1-3 Days]]

## 2023-12-31 NOTE — ED ADULT TRIAGE NOTE - BP NONINVASIVE SYSTOLIC (MM HG)
PROCEDURE DATE: 3/30/2023    PROCEDURE: Lumbar interlaminar epidural steroid injection at L5-S1 under fluoroscopic guidance (right paramedian approach)    DIAGNOSIS: Lumbar radiculopathy  POSTOP DIAGNOSIS: SAME    PHYSICIAN: Seb Blackwell M.D.    MEDICATIONS INJECTED: 80 mg depo-medrol with 4 ml of preservative free NaCl    LOCAL ANESTHETIC INJECTED:    Lidocaine 1% 4 ml total    SEDATION MEDICATIONS: RN IV sedation    ESTIMATED BLOOD LOSS:  none    COMPLICATIONS:  none    TECHNIQUE:  Time-out taken to identify patient and procedure prior to starting the procedure.  With the patient laying in a prone position, the area was prepped and draped in the usual sterile fashion using ChloraPrep and a fenestrated drape.  After determining the target level with an AP fluoroscopic view, local anesthetic was given using a 25-gauge 1.5 inch needle by raising a wheal and then infiltrating toward the interlaminar entry space.  A 3.5 inch 20-gauge Touhy needle was introduced under AP fluoroscopic guidance to the interlaminar space of L5-S1. Once the trajectory was established, the needle was visualized in the lateral view and advanced using loss of resistance technique. Once in the desired position, 2 mL contrast was injected to confirm placement and there was no vascular uptake nor intrathecal spread.  The medication was then injected slowly. The patient tolerated the procedure well.      The patient was monitored after the procedure.   They were given post-procedure and discharge instructions to follow at home.  The patient was discharged in a stable condition.     146

## 2023-12-31 NOTE — ED PROVIDER NOTE - CARE PROVIDER_API CALL
Tamir Santoro  Neurosurgery  [address], [address]  [place] [address]  Phone: [phone]  Fax: [phone]  Follow Up Time: 1-3 Days   Tamir Santoro  Neurosurgery  [address], [address]  [place] [address]  Phone: [phone]  Fax: [phone]  Follow Up Time: 1-3 Days

## 2023-12-31 NOTE — ED ADULT TRIAGE NOTE - CHIEF COMPLAINT QUOTE
Sent from [facility] for increased weakness of LLE x three days. As per daughter the patient was nauseous and unable to keep any food down on  and fell and has difficulty  putting pressure on the leg. Patient had additional fall this morning, + head strike. Daughter denies blood thinner use. Patient is unable to state name and  in triage, patient not as baseline as per daughter. f/s 146 Sent from [facility] for increased weakness of LLE x three days. As per daughter the patient was nauseous and unable to keep any food down on  and fell and has difficulty  putting pressure on the leg. Patient had additional fall this morning, + head strike. Daughter denies blood thinner use. Patient is unable to state name and  in triage, patient not as baseline as per daughter. f/s 146. + hematoma on head

## 2023-12-31 NOTE — ED PROVIDER NOTE - CLINICAL SUMMARY MEDICAL DECISION MAKING FREE TEXT BOX
77 y/o F with PMH HTN, CVA presenting to the ED c/o weakness.   Vitals stable.  She is well appearing in NAD  A&OX2  Will obtain trauma work up given [facility]MS after fall and scalp hematoma  Will reassess following labs/imaging 79 y/o F with PMH HTN, CVA presenting to the ED c/o weakness.   Vitals stable.  She is well appearing in NAD  A&OX2  Will obtain trauma work up given AMS after fall and scalp hematoma  Will reassess following labs/imaging

## 2023-12-31 NOTE — ED ADULT NURSE NOTE - OBJECTIVE STATEMENT
78yF A&Ox2 presenting to ED after being seen by [facility] for increased weakness of LLE x three days. As per daughter the patient was nauseous and unable to keep any food down on  and fell and has difficulty  putting pressure on the leg. Patient had additional fall this morning, + head strike. as per daughter, pt is not at baseline mental status and appears more confused since last fall. Daughter denies blood thinner use. Patient provided name but was unable to provide  in triage. f/s 146. + hematoma on back of head. pt endorses left leg pain at this time and denies all other pain.

## 2023-12-31 NOTE — ED ADULT NURSE NOTE - CHIEF COMPLAINT QUOTE
Sent from [facility] for increased weakness of LLE x three days. As per daughter the patient was nauseous and unable to keep any food down on  and fell and has difficulty  putting pressure on the leg. Patient had additional fall this morning, + head strike. Daughter denies blood thinner use. Patient is unable to state name and  in triage, patient not as baseline as per daughter. f/s 146. + hematoma on head

## 2023-12-31 NOTE — ED PROVIDER NOTE - PROGRESS NOTE DETAILS
CT w/ R 5mm brain aneurysm, I discussed with neurosurgery and patient can f/u outpatient.  Labs and imaging otherwise unremarkable.  Patient is at baseline mental status per her daughter.  She does not want to be admitted and daughter would like to take patient home, does not want GIUSEPPE or patient to be evaluated by inpatient PT at this time.

## 2023-12-31 NOTE — ED ADULT NURSE NOTE - NSFALLRISKINTERV_ED_ALL_ED
Assistance OOB with selected safe patient handling equipment if applicable/Assistance with ambulation/Communicate fall risk and risk factors to all staff, patient, and family/Monitor gait and stability/Monitor for mental status changes and reorient to person, place, and time, as needed/Provide patient with walking aids/Provide visual cue: yellow wristband, yellow gown, etc/Reinforce activity limits and safety measures with patient and family/Toileting schedule using arm’s reach rule for commode and bathroom/Use of alarms - bed, stretcher, chair and/or video monitoring/Call bell, personal items and telephone in reach/Instruct patient to call for assistance before getting out of bed/chair/stretcher/Non-slip footwear applied when patient is off stretcher/[place] to call system/Physically safe environment - no spills, clutter or unnecessary equipment/Purposeful Proactive Rounding/Room/bathroom lighting operational, light cord in reach Assistance OOB with selected safe patient handling equipment if applicable/Assistance with ambulation/Communicate fall risk and risk factors to all staff, patient, and family/Monitor gait and stability/Monitor for mental status changes and reorient to person, place, and time, as needed/Provide patient with walking aids/Provide visual cue: yellow wristband, yellow gown, etc/Reinforce activity limits and safety measures with patient and family/Toileting schedule using arm’s reach rule for commode and bathroom/Use of alarms - bed, stretcher, chair and/or video monitoring/Call bell, personal items and telephone in reach/Instruct patient to call for assistance before getting out of bed/chair/stretcher/Non-slip footwear applied when patient is off stretcher/[place] to call system/Physically safe environment - no spills, clutter or unnecessary equipment/Purposeful Proactive Rounding/Room/bathroom lighting operational, light cord in reach

## 2023-12-31 NOTE — ED PROVIDER NOTE - PHYSICAL EXAMINATION
GENERAL: Awake, alert, NAD  HEENT: NC/AT, moist mucous membranes, PERRL, EOMI, +hematoma on the posterior occiput   LUNGS: CTAB, no wheezes or crackles   CARDIAC: RRR, no m/r/g  ABDOMEN: Soft, non tender, non distended, no rebound, no guarding  EXT: No edema, no calf tenderness, no deformities, mild tenderness to L distal shin  NEURO: A&Ox2. Moving all extremities.  SKIN: Warm and dry. No rash.  PSYCH: Normal affect.

## 2023-12-31 NOTE — ED PROVIDER NOTE - OBJECTIVE STATEMENT
77 y/o F with PMH HTN, CVA presenting to the ED c/o weakness. Patient is presenting to the ED c/o LLE weakness x 3 days. Per daughter, patient fell and was having difficulty walking the past few days. She brought patient to [facility] and she fell walking out of the clinic, hitting the back of her head. Reports patient has been more confused since hitting her head. Not on blood thinners. Patient endorses some pain in her left leg. No fever, chills, N/V/D, chest pain, dyspnea. 79 y/o F with PMH HTN, CVA presenting to the ED c/o weakness. Patient is presenting to the ED c/o LLE weakness x 3 days. Per daughter, patient fell and was having difficulty walking the past few days. She brought patient to [facility] and she fell walking out of the clinic, hitting the back of her head. Reports patient has been more confused since hitting her head. Not on blood thinners. Patient endorses some pain in her left leg. No fever, chills, N/V/D, chest pain, dyspnea.

## 2024-01-01 ENCOUNTER — INPATIENT (INPATIENT)
Facility: HOSPITAL | Age: 79
LOS: 4 days | Discharge: HOME HEALTH | End: 2024-01-06
Attending: STUDENT IN AN ORGANIZED HEALTH CARE EDUCATION/TRAINING PROGRAM | Admitting: STUDENT IN AN ORGANIZED HEALTH CARE EDUCATION/TRAINING PROGRAM
Payer: MEDICARE

## 2024-01-01 VITALS
TEMPERATURE: 98 F | SYSTOLIC BLOOD PRESSURE: 183 MMHG | RESPIRATION RATE: 16 BRPM | HEART RATE: 60 BPM | DIASTOLIC BLOOD PRESSURE: 109 MMHG | OXYGEN SATURATION: 97 %

## 2024-01-01 LAB
ALBUMIN SERPL ELPH-MCNC: 3.7 G/DL — SIGNIFICANT CHANGE UP (ref 3.3–5)
ALBUMIN SERPL ELPH-MCNC: 3.7 G/DL — SIGNIFICANT CHANGE UP (ref 3.3–5)
ALP SERPL-CCNC: 175 U/L — HIGH (ref 40–120)
ALP SERPL-CCNC: 175 U/L — HIGH (ref 40–120)
ALT FLD-CCNC: 17 U/L — SIGNIFICANT CHANGE UP (ref 4–33)
ALT FLD-CCNC: 17 U/L — SIGNIFICANT CHANGE UP (ref 4–33)
ANION GAP SERPL CALC-SCNC: 14 MMOL/L — SIGNIFICANT CHANGE UP (ref 7–14)
ANION GAP SERPL CALC-SCNC: 14 MMOL/L — SIGNIFICANT CHANGE UP (ref 7–14)
AST SERPL-CCNC: 18 U/L — SIGNIFICANT CHANGE UP (ref 4–32)
AST SERPL-CCNC: 18 U/L — SIGNIFICANT CHANGE UP (ref 4–32)
BILIRUB SERPL-MCNC: 0.5 MG/DL — SIGNIFICANT CHANGE UP (ref 0.2–1.2)
BILIRUB SERPL-MCNC: 0.5 MG/DL — SIGNIFICANT CHANGE UP (ref 0.2–1.2)
BUN SERPL-MCNC: 7 MG/DL — SIGNIFICANT CHANGE UP (ref 7–23)
BUN SERPL-MCNC: 7 MG/DL — SIGNIFICANT CHANGE UP (ref 7–23)
CALCIUM SERPL-MCNC: 8.9 MG/DL — SIGNIFICANT CHANGE UP (ref 8.4–10.5)
CALCIUM SERPL-MCNC: 8.9 MG/DL — SIGNIFICANT CHANGE UP (ref 8.4–10.5)
CHLORIDE SERPL-SCNC: 101 MMOL/L — SIGNIFICANT CHANGE UP (ref 98–107)
CHLORIDE SERPL-SCNC: 101 MMOL/L — SIGNIFICANT CHANGE UP (ref 98–107)
CO2 SERPL-SCNC: 22 MMOL/L — SIGNIFICANT CHANGE UP (ref 22–31)
CO2 SERPL-SCNC: 22 MMOL/L — SIGNIFICANT CHANGE UP (ref 22–31)
CREAT SERPL-MCNC: 0.89 MG/DL — SIGNIFICANT CHANGE UP (ref 0.5–1.3)
CREAT SERPL-MCNC: 0.89 MG/DL — SIGNIFICANT CHANGE UP (ref 0.5–1.3)
EGFR: 66 ML/MIN/1.73M2 — SIGNIFICANT CHANGE UP
EGFR: 66 ML/MIN/1.73M2 — SIGNIFICANT CHANGE UP
GLUCOSE SERPL-MCNC: 105 MG/DL — HIGH (ref 70–99)
GLUCOSE SERPL-MCNC: 105 MG/DL — HIGH (ref 70–99)
HCT VFR BLD CALC: 37.9 % — SIGNIFICANT CHANGE UP (ref 34.5–45)
HCT VFR BLD CALC: 37.9 % — SIGNIFICANT CHANGE UP (ref 34.5–45)
HGB BLD-MCNC: 12.5 G/DL — SIGNIFICANT CHANGE UP (ref 11.5–15.5)
HGB BLD-MCNC: 12.5 G/DL — SIGNIFICANT CHANGE UP (ref 11.5–15.5)
MCHC RBC-ENTMCNC: 27.3 PG — SIGNIFICANT CHANGE UP (ref 27–34)
MCHC RBC-ENTMCNC: 27.3 PG — SIGNIFICANT CHANGE UP (ref 27–34)
MCHC RBC-ENTMCNC: 33 GM/DL — SIGNIFICANT CHANGE UP (ref 32–36)
MCHC RBC-ENTMCNC: 33 GM/DL — SIGNIFICANT CHANGE UP (ref 32–36)
MCV RBC AUTO: 82.8 FL — SIGNIFICANT CHANGE UP (ref 80–100)
MCV RBC AUTO: 82.8 FL — SIGNIFICANT CHANGE UP (ref 80–100)
NRBC # BLD: 0 /100 WBCS — SIGNIFICANT CHANGE UP (ref 0–0)
NRBC # BLD: 0 /100 WBCS — SIGNIFICANT CHANGE UP (ref 0–0)
NRBC # FLD: 0 K/UL — SIGNIFICANT CHANGE UP (ref 0–0)
NRBC # FLD: 0 K/UL — SIGNIFICANT CHANGE UP (ref 0–0)
PLATELET # BLD AUTO: 322 K/UL — SIGNIFICANT CHANGE UP (ref 150–400)
PLATELET # BLD AUTO: 322 K/UL — SIGNIFICANT CHANGE UP (ref 150–400)
POTASSIUM SERPL-MCNC: 3.8 MMOL/L — SIGNIFICANT CHANGE UP (ref 3.5–5.3)
POTASSIUM SERPL-MCNC: 3.8 MMOL/L — SIGNIFICANT CHANGE UP (ref 3.5–5.3)
POTASSIUM SERPL-SCNC: 3.8 MMOL/L — SIGNIFICANT CHANGE UP (ref 3.5–5.3)
POTASSIUM SERPL-SCNC: 3.8 MMOL/L — SIGNIFICANT CHANGE UP (ref 3.5–5.3)
PROT SERPL-MCNC: 7.1 G/DL — SIGNIFICANT CHANGE UP (ref 6–8.3)
PROT SERPL-MCNC: 7.1 G/DL — SIGNIFICANT CHANGE UP (ref 6–8.3)
RBC # BLD: 4.58 M/UL — SIGNIFICANT CHANGE UP (ref 3.8–5.2)
RBC # BLD: 4.58 M/UL — SIGNIFICANT CHANGE UP (ref 3.8–5.2)
RBC # FLD: 16.3 % — HIGH (ref 10.3–14.5)
RBC # FLD: 16.3 % — HIGH (ref 10.3–14.5)
SODIUM SERPL-SCNC: 137 MMOL/L — SIGNIFICANT CHANGE UP (ref 135–145)
SODIUM SERPL-SCNC: 137 MMOL/L — SIGNIFICANT CHANGE UP (ref 135–145)
WBC # BLD: 8.03 K/UL — SIGNIFICANT CHANGE UP (ref 3.8–10.5)
WBC # BLD: 8.03 K/UL — SIGNIFICANT CHANGE UP (ref 3.8–10.5)
WBC # FLD AUTO: 8.03 K/UL — SIGNIFICANT CHANGE UP (ref 3.8–10.5)
WBC # FLD AUTO: 8.03 K/UL — SIGNIFICANT CHANGE UP (ref 3.8–10.5)

## 2024-01-01 PROCEDURE — 99285 EMERGENCY DEPT VISIT HI MDM: CPT

## 2024-01-01 PROCEDURE — 70450 CT HEAD/BRAIN W/O DYE: CPT | Mod: 26,MA

## 2024-01-01 NOTE — ED ADULT NURSE NOTE - CHIEF COMPLAINT QUOTE
Seen at [facility] yesterday for trip and fall with negative work up, +head strike, family called for concerns of increased lethargy since fall (yesterday morning), pt endorsing headache, hypertensive in triage, no neuro deficits, h/o CVA with left deficits Seen at [place] yesterday for trip and fall with negative work up, +head strike, family called for concerns of increased lethargy since fall (yesterday morning), pt endorsing headache, hypertensive in triage, no neuro deficits, h/o CVA with left deficits

## 2024-01-01 NOTE — ED ADULT TRIAGE NOTE - AS PAIN REST
[address] Medicine Residency Resident Note in Brief 
---------------------------------------- 
 
S: Assessed pt at bedside given frequent anxiety episodes recently. Pt reports that she is feeling a lot better and has been able to sleep well tonight. O: 
Visit Vitals /78 (BP 1 Location: Left arm, BP Patient Position: At rest;Sitting) Pulse (!) 115 Temp 97.7 °F (36.5 °C) Resp 24 Ht 5' 4\" (1.626 m) Wt 128 lb (58.1 kg) SpO2 100% BMI 21.97 kg/m² Gen: No acute distress. Pleasant. CV: Mildly tachycardic. Regular rhythm. No murmur. Resp: LCTAB. Ext: No edema. A/P Ms Manuel Nuñez is a 62 yo F who was admitted for COPD exacerbation and SIRS. - Pt on 2L NC currently 
- Continue alb neb q6h 
- Continue cardizem 240 mg daily - Atarax PRN 
- Continue current plan of care Please see full daily progress note for complete plan.  
Sheree Thoams MD 
3:33 AM 
 4 (moderate pain)

## 2024-01-01 NOTE — ED ADULT NURSE REASSESSMENT NOTE - NS ED NURSE REASSESS COMMENT FT1
received report from jose gilmore. Pt is a/o x 3. pt take for CT at this time. no complaints of chest pain, headache, nausea, dizziness, vomiting, SOB, fever, chills   verbalized. Pt has 20g iv placed to left AC with no redness or swelling noted. pt respirations even and unlabored. pt appears in NAD, pending urine

## 2024-01-01 NOTE — ED PROVIDER NOTE - ATTENDING CONTRIBUTION TO CARE
Patient seen and evaluated as noted above, agree with patient care plan Patient seen and evaluated as noted above, agree with patient care plan.

## 2024-01-01 NOTE — ED ADULT NURSE NOTE - HIV OFFER
[FreeTextEntry1] : Impression  Epigastric discomfort  Up-to-date on colonoscopy  Suggest  Omeprazole 40 mg a day  Gaviscon as needed  .Lab work  Abdominal ultrasound  Upper endoscopy  Risks/benefits: The procedure, the risks and benefits and alternatives have been reviewed in great detail with the patient.  Risks including, but not limited to sedation such as cardiac and pulmonary compromise, the procedure itself such as bleeding requiring hospitalization, transfusion, surgery, temporary or permanent colostomy.  Perforation or puncture of the requiring hospitalization, surgery, temporary colostomy.  The patient expresses understanding of the procedure and consents to undergoing the procedure.  
Opt out

## 2024-01-01 NOTE — ED PROVIDER NOTE - PHYSICAL EXAMINATION
Attending/Tracey: NAD, Hhead-atraumatic, PERRL/EOMI, no cspine PT, RRR, CTAB, ABd-soft, NT/ND, no LE edema, all joints FROM; A&Ox3, nonfocal; Skin-warm/dry/no ecchymosis

## 2024-01-01 NOTE — ED PROVIDER NOTE - OBJECTIVE STATEMENT
Attending/Tracey: 79 y/o F h/o HTN, CVA had presented to the presenting to the ED at [facility] c/o weakness. Attending/Tracey: 77 y/o F h/o HTN, CVA had presented to the presenting to the ED at [facility] c/o weakness. Attending/Tracey: 77 y/o F h/o HTN, CVA (2012; uses walker) had presented to the presenting to the ED at [facility] c/o weakness and multiple falls over the past week. 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 Neurosurgery recommend OP evaluation. Today patients daughter brought her ot the ED due to headaches since waking up this morning at 0730 and not acting herself. No reports of n/v, increase sleeping, neck pain, change in vision. Attending/Tracey: 77 y/o F h/o HTN, CVA (2012; uses walker) had presented to the presenting to the ED at [facility] c/o weakness and multiple falls over the past week. 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 Neurosurgery recommend OP evaluation. Today patients daughter brought her ot the ED due to headaches since waking up this morning at 0730 and not acting herself. No reports of n/v, increase sleeping, neck pain, change in vision. Attending/Tracey: 77 y/o F h/o HTN, CVA (2012; uses walker) had presented to the presenting to the ED at [facility] c/o weakness and multiple falls over the past week. 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 Neurosurgery recommend OP evaluation. Today patients daughter brought her ot the ED due to headaches since waking up this morning at 0730 and not acting herself. No reports of n/v, increase sleeping, neck pain, change in vision.    ([facility] MRN#: [id number]) Attending/Tracey: 77 y/o F h/o HTN, CVA (2012; uses walker) had presented to the presenting to the ED at [facility] c/o weakness and multiple falls over the past week. 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 Neurosurgery recommend OP evaluation. Today patients daughter brought her ot the ED due to headaches since waking up this morning at 0730 and not acting herself. No reports of n/v, increase sleeping, neck pain, change in vision.    ([facility] MRN#: [id number])

## 2024-01-01 NOTE — ED PROVIDER NOTE - CLINICAL SUMMARY MEDICAL DECISION MAKING FREE TEXT BOX
A/P 78-year-old female history of hypertension, CVA, seen and evaluated at [facility] with workup that included a CT and laboratory test.  Today brought to the emergency department by her daughter for headaches and not acting at her baseline.  Exam noted for ANO x 3, nonfocal neurological exam.  Plan: Screening EKG, repeat head CT, labs including UA, monitoring, reassess A/P 78-year-old female history of hypertension, CVA, seen and evaluated at [facility] with workup that included a CT and laboratory test.  Today brought to the emergency department by her daughter for headaches and not acting at her baseline.  Exam noted for ANO x 3, nonfocal neurological exam.  Plan: Screening EKG, repeat head CT, labs including UA, monitoring, reassess

## 2024-01-01 NOTE — ED ADULT TRIAGE NOTE - CHIEF COMPLAINT QUOTE
Seen at [place] yesterday for trip and fall with negative work up, +head strike, family called for concerns of increased lethargy since fall, pt endorsing headache, hypertensive in triage, no neuro deficits, h/o CVA with left deficits Seen at [place] yesterday for trip and fall with negative work up, +head strike, family called for concerns of increased lethargy since fall, pt endorsing headache, hypertensive in triage, no neuro deficits, h/o CVA with left deficits Seen at [place] yesterday for trip and fall with negative work up, +head strike, family called for concerns of increased lethargy since fall (yesterday morning), pt endorsing headache, hypertensive in triage, no neuro deficits, h/o CVA with left deficits Seen at [place] yesterday for trip and fall with negative work up, +head strike, family called for concerns of increased lethargy since fall (yesterday morning), pt endorsing headache, hypertensive in triage, no neuro deficits, h/o CVA with left deficits

## 2024-01-01 NOTE — ED PROVIDER NOTE - PROGRESS NOTE DETAILS
CT head negative for acute changes. No changes, patient stable. Will admit to medicine given multiple falls/need for PT evaluation.    Pending UA.

## 2024-01-01 NOTE — ED ADULT NURSE NOTE - OBJECTIVE STATEMENT
pt received to [facility] spot 24a a&ox3 with past medical history of hypertension, high cholesterol, CVA with left residual weakness c/o headache. pt with no neuro or sensory deficits. pt family at bedside states "went to [facility] yesterday with negative workup." pt fall with positive head strike. pt with no sign of trauma visualized. pt 20g to the Left AC placed by float rn Lino. pt denies chest pain, sob, nausea, vomiting, dizziness, headache, fevers/chills. pt respirations even and unlabored. pt appears in NAD, safety maintained. pending CT read, pt received to [facility] spot 24a a&ox3 with past medical history of hypertension, high cholesterol, CVA with left residual weakness c/o headache. pt with no neuro or sensory deficits. pt family at bedside states "went to [facility] yesterday with negative workup." pt fall with positive head strike. pt with no sign of trauma visualized. pt 20g to the Left AC placed by float rn Lino. pt denies chest pain, sob, nausea, vomiting, dizziness, headache, fevers/chills. pt respirations even and unlabored. pt appears in NAD, safety maintained. pending CT read,

## 2024-01-01 NOTE — ED ADULT NURSE NOTE - NSFALLHARMRISKINTERV_ED_ALL_ED
Assistance OOB with selected safe patient handling equipment if applicable/Assistance with ambulation/Communicate risk of Fall with Harm to all staff, patient, and family/Monitor gait and stability/Provide patient with walking aids/Provide visual cue: red socks, yellow wristband, yellow gown, etc/Reinforce activity limits and safety measures with patient and family/Bed in lowest position, wheels locked, appropriate side rails in place/Call bell, personal items and telephone in reach/Instruct patient to call for assistance before getting out of bed/chair/stretcher/Non-slip footwear applied when patient is off stretcher/[place] to call system/Physically safe environment - no spills, clutter or unnecessary equipment/Purposeful Proactive Rounding/Room/bathroom lighting operational, light cord in reach Assistance OOB with selected safe patient handling equipment if applicable/Assistance with ambulation/Communicate risk of Fall with Harm to all staff, patient, and family/Monitor gait and stability/Provide patient with walking aids/Provide visual cue: red socks, yellow wristband, yellow gown, etc/Reinforce activity limits and safety measures with patient and family/Bed in lowest position, wheels locked, appropriate side rails in place/Call bell, personal items and telephone in reach/Instruct patient to call for assistance before getting out of bed/chair/stretcher/Non-slip footwear applied when patient is off stretcher/[place] to call system/Physically safe environment - no spills, clutter or unnecessary equipment/Purposeful Proactive Rounding/Room/bathroom lighting operational, light cord in reach

## 2024-01-02 DIAGNOSIS — I67.1 CEREBRAL ANEURYSM, NONRUPTURED: ICD-10-CM

## 2024-01-02 DIAGNOSIS — I10 ESSENTIAL (PRIMARY) HYPERTENSION: ICD-10-CM

## 2024-01-02 DIAGNOSIS — Z29.9 ENCOUNTER FOR PROPHYLACTIC MEASURES, UNSPECIFIED: ICD-10-CM

## 2024-01-02 DIAGNOSIS — R26.81 UNSTEADINESS ON FEET: ICD-10-CM

## 2024-01-02 DIAGNOSIS — R59.0 LOCALIZED ENLARGED LYMPH NODES: ICD-10-CM

## 2024-01-02 DIAGNOSIS — K11.8 OTHER DISEASES OF SALIVARY GLANDS: ICD-10-CM

## 2024-01-02 DIAGNOSIS — Z91.89 OTHER SPECIFIED PERSONAL RISK FACTORS, NOT ELSEWHERE CLASSIFIED: ICD-10-CM

## 2024-01-02 DIAGNOSIS — D32.9 BENIGN NEOPLASM OF MENINGES, UNSPECIFIED: ICD-10-CM

## 2024-01-02 DIAGNOSIS — R29.6 REPEATED FALLS: ICD-10-CM

## 2024-01-02 DIAGNOSIS — M79.605 PAIN IN LEFT LEG: ICD-10-CM

## 2024-01-02 DIAGNOSIS — R51.9 HEADACHE, UNSPECIFIED: ICD-10-CM

## 2024-01-02 DIAGNOSIS — R41.0 DISORIENTATION, UNSPECIFIED: ICD-10-CM

## 2024-01-02 LAB
B PERT DNA SPEC QL NAA+PROBE: SIGNIFICANT CHANGE UP
B PERT DNA SPEC QL NAA+PROBE: SIGNIFICANT CHANGE UP
B PERT+PARAPERT DNA PNL SPEC NAA+PROBE: SIGNIFICANT CHANGE UP
B PERT+PARAPERT DNA PNL SPEC NAA+PROBE: SIGNIFICANT CHANGE UP
BORDETELLA PARAPERTUSSIS (RAPRVP): SIGNIFICANT CHANGE UP
BORDETELLA PARAPERTUSSIS (RAPRVP): SIGNIFICANT CHANGE UP
C PNEUM DNA SPEC QL NAA+PROBE: SIGNIFICANT CHANGE UP
C PNEUM DNA SPEC QL NAA+PROBE: SIGNIFICANT CHANGE UP
FLUAV SUBTYP SPEC NAA+PROBE: SIGNIFICANT CHANGE UP
FLUAV SUBTYP SPEC NAA+PROBE: SIGNIFICANT CHANGE UP
FLUBV RNA SPEC QL NAA+PROBE: SIGNIFICANT CHANGE UP
FLUBV RNA SPEC QL NAA+PROBE: SIGNIFICANT CHANGE UP
HADV DNA SPEC QL NAA+PROBE: SIGNIFICANT CHANGE UP
HADV DNA SPEC QL NAA+PROBE: SIGNIFICANT CHANGE UP
HCOV 229E RNA SPEC QL NAA+PROBE: SIGNIFICANT CHANGE UP
HCOV 229E RNA SPEC QL NAA+PROBE: SIGNIFICANT CHANGE UP
HCOV HKU1 RNA SPEC QL NAA+PROBE: SIGNIFICANT CHANGE UP
HCOV HKU1 RNA SPEC QL NAA+PROBE: SIGNIFICANT CHANGE UP
HCOV NL63 RNA SPEC QL NAA+PROBE: SIGNIFICANT CHANGE UP
HCOV NL63 RNA SPEC QL NAA+PROBE: SIGNIFICANT CHANGE UP
HCOV OC43 RNA SPEC QL NAA+PROBE: SIGNIFICANT CHANGE UP
HCOV OC43 RNA SPEC QL NAA+PROBE: SIGNIFICANT CHANGE UP
HMPV RNA SPEC QL NAA+PROBE: SIGNIFICANT CHANGE UP
HMPV RNA SPEC QL NAA+PROBE: SIGNIFICANT CHANGE UP
HPIV1 RNA SPEC QL NAA+PROBE: SIGNIFICANT CHANGE UP
HPIV1 RNA SPEC QL NAA+PROBE: SIGNIFICANT CHANGE UP
HPIV2 RNA SPEC QL NAA+PROBE: SIGNIFICANT CHANGE UP
HPIV2 RNA SPEC QL NAA+PROBE: SIGNIFICANT CHANGE UP
HPIV3 RNA SPEC QL NAA+PROBE: SIGNIFICANT CHANGE UP
HPIV3 RNA SPEC QL NAA+PROBE: SIGNIFICANT CHANGE UP
HPIV4 RNA SPEC QL NAA+PROBE: SIGNIFICANT CHANGE UP
HPIV4 RNA SPEC QL NAA+PROBE: SIGNIFICANT CHANGE UP
M PNEUMO DNA SPEC QL NAA+PROBE: SIGNIFICANT CHANGE UP
M PNEUMO DNA SPEC QL NAA+PROBE: SIGNIFICANT CHANGE UP
RAPID RVP RESULT: SIGNIFICANT CHANGE UP
RAPID RVP RESULT: SIGNIFICANT CHANGE UP
RSV RNA SPEC QL NAA+PROBE: SIGNIFICANT CHANGE UP
RSV RNA SPEC QL NAA+PROBE: SIGNIFICANT CHANGE UP
RV+EV RNA SPEC QL NAA+PROBE: SIGNIFICANT CHANGE UP
RV+EV RNA SPEC QL NAA+PROBE: SIGNIFICANT CHANGE UP
SARS-COV-2 RNA SPEC QL NAA+PROBE: SIGNIFICANT CHANGE UP
SARS-COV-2 RNA SPEC QL NAA+PROBE: SIGNIFICANT CHANGE UP

## 2024-01-02 PROCEDURE — 99223 1ST HOSP IP/OBS HIGH 75: CPT

## 2024-01-02 PROCEDURE — 71045 X-RAY EXAM CHEST 1 VIEW: CPT | Mod: 26

## 2024-01-02 RX ORDER — PANTOPRAZOLE SODIUM 20 MG/1
40 TABLET, DELAYED RELEASE ORAL
Refills: 0 | Status: DISCONTINUED | OUTPATIENT
Start: 2024-01-02 | End: 2024-01-06

## 2024-01-02 RX ORDER — ATORVASTATIN CALCIUM 80 MG/1
40 TABLET, FILM COATED ORAL AT BEDTIME
Refills: 0 | Status: DISCONTINUED | OUTPATIENT
Start: 2024-01-02 | End: 2024-01-06

## 2024-01-02 RX ORDER — SODIUM CHLORIDE 9 MG/ML
1000 INJECTION, SOLUTION INTRAVENOUS ONCE
Refills: 0 | Status: COMPLETED | OUTPATIENT
Start: 2024-01-02 | End: 2024-01-02

## 2024-01-02 RX ORDER — ENOXAPARIN SODIUM 100 MG/ML
40 INJECTION SUBCUTANEOUS EVERY 24 HOURS
Refills: 0 | Status: DISCONTINUED | OUTPATIENT
Start: 2024-01-02 | End: 2024-01-06

## 2024-01-02 RX ORDER — ACETAMINOPHEN 500 MG
650 TABLET ORAL EVERY 6 HOURS
Refills: 0 | Status: DISCONTINUED | OUTPATIENT
Start: 2024-01-02 | End: 2024-01-06

## 2024-01-02 RX ORDER — LANOLIN ALCOHOL/MO/W.PET/CERES
3 CREAM (GRAM) TOPICAL AT BEDTIME
Refills: 0 | Status: DISCONTINUED | OUTPATIENT
Start: 2024-01-02 | End: 2024-01-06

## 2024-01-02 RX ORDER — AMLODIPINE BESYLATE 2.5 MG/1
10 TABLET ORAL DAILY
Refills: 0 | Status: DISCONTINUED | OUTPATIENT
Start: 2024-01-02 | End: 2024-01-06

## 2024-01-02 RX ORDER — LOSARTAN POTASSIUM 100 MG/1
25 TABLET, FILM COATED ORAL DAILY
Refills: 0 | Status: DISCONTINUED | OUTPATIENT
Start: 2024-01-02 | End: 2024-01-06

## 2024-01-02 RX ORDER — ALBUTEROL 90 UG/1
2 AEROSOL, METERED ORAL EVERY 6 HOURS
Refills: 0 | Status: DISCONTINUED | OUTPATIENT
Start: 2024-01-02 | End: 2024-01-06

## 2024-01-02 RX ORDER — MOMETASONE FUROATE 220 UG/1
1 INHALANT RESPIRATORY (INHALATION) DAILY
Refills: 0 | Status: DISCONTINUED | OUTPATIENT
Start: 2024-01-02 | End: 2024-01-03

## 2024-01-02 RX ADMIN — ENOXAPARIN SODIUM 40 MILLIGRAM(S): 100 INJECTION SUBCUTANEOUS at 17:26

## 2024-01-02 RX ADMIN — Medication 3 MILLIGRAM(S): at 22:39

## 2024-01-02 RX ADMIN — SODIUM CHLORIDE 1000 MILLILITER(S): 9 INJECTION, SOLUTION INTRAVENOUS at 01:06

## 2024-01-02 RX ADMIN — ATORVASTATIN CALCIUM 40 MILLIGRAM(S): 80 TABLET, FILM COATED ORAL at 22:39

## 2024-01-02 NOTE — H&P ADULT - PROBLEM SELECTOR PLAN 6
- continue home amlodipine 10mg, losartan 25mg Patient reports developing pain in back of thigh after falling out of bed about 1 week ago. Denies any back pain or pain radiating down leg. No changes in bowel or bladder habits.  - unlikely to be neuropathic given no back pain  - pt reports that mild LLE weakness compared to RLE is at baseline 2/2 previous CVA  - monitor

## 2024-01-02 NOTE — ED ADULT NURSE REASSESSMENT NOTE - NS ED NURSE REASSESS COMMENT FT1
received report from  jennifer ross. Pt is a/o x 3. pt pending urine at this time. pt educated on need for urine, pt states does not have to go at this time. pt safety maintained. pt respirations even and unlabored with no accessory muscle use

## 2024-01-02 NOTE — H&P ADULT - ASSESSMENT
78F PMH HTN, CVA (2012; residual LLE weakness, uses walker), brain aneurysm (diagnosed >15 years ago, no surgery) with recent presentation to [facility] with multiple falls now presenting to [facility] for severe headache and agitation x1 day, now resolved while in the ED. 78F PMH HTN, CVA (2012; residual LLE weakness, uses walker), brain aneurysm (diagnosed >15 years ago, no surgery) with recent presentation to [facility] with multiple falls now presenting to [facility] for severe headache and agitation x1 day, now resolved while in the ED.

## 2024-01-02 NOTE — CONSULT NOTE ADULT - SUBJECTIVE AND OBJECTIVE BOX
HPI:  78F PMH HTN, CVA (2012; residual LLE weakness, uses walker) with recent presentation to [facility] with multiple falls now presenting to [facility] for severe headache and agitation x1 day. Pt accompanied by daughter who is able to give most of the history. Patient had a fall about 1 week ago while getting out of bed and injured her LLE. Since then patient has had multiple falls over the past week, the most recent of which was on 12/31 when patient fell back and hit her head. Patient did not have LOC but presented to [facility]. 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 Pt and daughter report that this aneurysm was diagnosed 15 years ago and has been being monitored with periodic CT scans but admit that they may not have been following up as much recently. Pt was discharged after refusing to work with PT. The next AM, pt awoke with severe frontal HA with photophobia. Daughter also reports that patient was agitated and disoriented. Baseline mental status is conversive and AAOx2-3. Denies fever, chills, chest pain, sob, new focal weakness, nausea, vomiting. Was able to tolerate PO. Patient now back at baseline mental status when interviewed in the ED. Reports HA resolved. Denies other symptoms. LLE pain has been improving and is located in the back of the L thigh, no radiation. No back pain or changes in bowel or bladder habits.  	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 650 milliGRAM(s) Oral every 6 hours PRN Temp greater or equal to 38C (100.4F), Mild Pain (1 - 3)  albuterol    90 MICROgram(s) HFA Inhaler 2 Puff(s) Inhalation every 6 hours PRN for shortness of breath and/or wheezing  benzonatate 100 milliGRAM(s) Oral three times a day PRN for cough  melatonin 3 milliGRAM(s) Oral at bedtime PRN Insomnia    Vital Signs Last 24 Hrs  T(C): 37 (02 Jan 2024 10:27), Max: 37.7 (01 Jan 2024 21:57)  T(F): 98.6 (02 Jan 2024 10:27), Max: 99.9 (01 Jan 2024 21:57)  HR: 79 (02 Jan 2024 10:27) (60 - 90)  BP: 126/68 (02 Jan 2024 10:27) (126/68 - 183/109)  BP(mean): 83 (02 Jan 2024 10:27) (83 - 83)  RR: 16 (02 Jan 2024 10:27) (16 - 16)  SpO2: 100% (02 Jan 2024 10:27) (97% - 100%)    Parameters below as of 02 Jan 2024 10:27  Patient On (Oxygen Delivery Method): room air    PE:  AA&0 x 2, CN 2-12 grossly intact, speech clear, follows commands, PERRL  Motor:  Sensory:      LABS:                        12.5   8.03  )-----------( 322      ( 01 Jan 2024 21:00 )             37.9     01-01    137  |  101  |  7   ----------------------------<  105<H>  3.8   |  22  |  0.89    Ca    8.9      01 Jan 2024 21:00    TPro  7.1  /  Alb  3.7  /  TBili  0.5  /  DBili  x   /  AST  18  /  ALT  17  /  AlkPhos  175<H>  01-01      Urinalysis Basic - ( 01 Jan 2024 21:00 )    Color: x / Appearance: x / SG: x / pH: x  Gluc: 105 mg/dL / Ketone: x  / Bili: x / Urobili: x   Blood: x / Protein: x / Nitrite: x   Leuk Esterase: x / RBC: x / WBC x   Sq Epi: x / Non Sq Epi: x / Bacteria: x             HPI:  78F PMH HTN, CVA (2012; residual LLE weakness, uses walker) with recent presentation to [facility] with multiple falls now presenting to [facility] for severe headache and agitation x1 day. Pt accompanied by daughter who is able to give most of the history. Patient had a fall about 1 week ago while getting out of bed and injured her LLE. Since then patient has had multiple falls over the past week, the most recent of which was on 12/31 when patient fell back and hit her head. Patient did not have LOC but presented to [facility]. At [facility] ED patient had CT head that showed no bleed and CTA H and N that was showed 5mm R pericallosal aneurysm. Pt and daughter report that this aneurysm was diagnosed 15 years ago and has been being monitored with periodic CT scans but admit that they may not have been following up as much recently. Pt was discharged after refusing to work with PT. The next AM, pt awoke with severe frontal HA with photophobia. Daughter also reports that patient was agitated and disoriented. Baseline mental status is conversive and AAOx2-3. Denies fever, chills, chest pain, sob, new focal weakness, nausea, vomiting. Was able to tolerate PO. Patient now back at baseline mental status when interviewed in the ED. Reports HA resolved. Denies other symptoms. LLE pain has been improving and is located in the back of the L thigh, no radiation. No back pain or changes in bowel or bladder habits.  	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 650 milliGRAM(s) Oral every 6 hours PRN Temp greater or equal to 38C (100.4F), Mild Pain (1 - 3)  albuterol    90 MICROgram(s) HFA Inhaler 2 Puff(s) Inhalation every 6 hours PRN for shortness of breath and/or wheezing  benzonatate 100 milliGRAM(s) Oral three times a day PRN for cough  melatonin 3 milliGRAM(s) Oral at bedtime PRN Insomnia    Vital Signs Last 24 Hrs  T(C): 37 (02 Jan 2024 10:27), Max: 37.7 (01 Jan 2024 21:57)  T(F): 98.6 (02 Jan 2024 10:27), Max: 99.9 (01 Jan 2024 21:57)  HR: 79 (02 Jan 2024 10:27) (60 - 90)  BP: 126/68 (02 Jan 2024 10:27) (126/68 - 183/109)  BP(mean): 83 (02 Jan 2024 10:27) (83 - 83)  RR: 16 (02 Jan 2024 10:27) (16 - 16)  SpO2: 100% (02 Jan 2024 10:27) (97% - 100%)    Parameters below as of 02 Jan 2024 10:27  Patient On (Oxygen Delivery Method): room air    PE:  AA&0 x 2, CN 2-12 grossly intact, speech clear, follows commands, PERRL  Motor:  Sensory:      LABS:                        12.5   8.03  )-----------( 322      ( 01 Jan 2024 21:00 )             37.9     01-01    137  |  101  |  7   ----------------------------<  105<H>  3.8   |  22  |  0.89    Ca    8.9      01 Jan 2024 21:00    TPro  7.1  /  Alb  3.7  /  TBili  0.5  /  DBili  x   /  AST  18  /  ALT  17  /  AlkPhos  175<H>  01-01      Urinalysis Basic - ( 01 Jan 2024 21:00 )    Color: x / Appearance: x / SG: x / pH: x  Gluc: 105 mg/dL / Ketone: x  / Bili: x / Urobili: x   Blood: x / Protein: x / Nitrite: x   Leuk Esterase: x / RBC: x / WBC x   Sq Epi: x / Non Sq Epi: x / Bacteria: x             HPI:  78F PMH HTN, CVA (2012; residual LLE weakness, uses walker) with recent presentation to [facility] with multiple falls now presenting to [facility] for severe headache and agitation x1 day. Pt accompanied by daughter who is able to give most of the history. Patient had a fall about 1 week ago while getting out of bed and injured her LLE. Since then patient has had multiple falls over the past week, the most recent of which was on 12/31 when patient fell back and hit her head. Patient did not have LOC but presented to [facility]. At [facility] ED patient had CT head that showed no bleed and CTA H and N that was showed 5mm R pericallosal aneurysm. Pt and daughter report that this aneurysm was diagnosed 15 years ago and has been being monitored with periodic CT scans but admit that they may not have been following up as much recently. Pt was discharged after refusing to work with PT. The next AM, pt awoke with severe frontal HA with photophobia. Daughter also reports that patient was agitated and disoriented. Baseline mental status is conversive and AAOx2-3. Denies fever, chills, chest pain, sob, new focal weakness, nausea, vomiting. Was able to tolerate PO. Patient now back at baseline mental status when interviewed in the ED. Reports HA resolved. Denies other symptoms. LLE pain has been improving and is located in the back of the L thigh, no radiation. No back pain or changes in bowel or bladder habits.  	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    PAST MEDICAL & SURGICAL HISTORY:  CVA (cerebrovascular accident)  HTN (hypertension)    Allergies  No Known Allergies    MEDICATIONS  (STANDING):  amLODIPine   Tablet 10 milliGRAM(s) Oral daily  atorvastatin 40 milliGRAM(s) Oral at bedtime  enoxaparin Injectable 40 milliGRAM(s) SubCutaneous every 24 hours  losartan 25 milliGRAM(s) Oral daily  mometasone 220 MICROgram(s) Inhaler 1 Puff(s) Inhalation daily  pantoprazole    Tablet 40 milliGRAM(s) Oral before breakfast    MEDICATIONS  (PRN):  acetaminophen     Tablet .. 650 milliGRAM(s) Oral every 6 hours PRN Temp greater or equal to 38C (100.4F), Mild Pain (1 - 3)  albuterol    90 MICROgram(s) HFA Inhaler 2 Puff(s) Inhalation every 6 hours PRN for shortness of breath and/or wheezing  benzonatate 100 milliGRAM(s) Oral three times a day PRN for cough  melatonin 3 milliGRAM(s) Oral at bedtime PRN Insomnia    Vital Signs Last 24 Hrs  T(C): 37 (02 Jan 2024 10:27), Max: 37.7 (01 Jan 2024 21:57)  T(F): 98.6 (02 Jan 2024 10:27), Max: 99.9 (01 Jan 2024 21:57)  HR: 79 (02 Jan 2024 10:27) (60 - 90)  BP: 126/68 (02 Jan 2024 10:27) (126/68 - 183/109)  BP(mean): 83 (02 Jan 2024 10:27) (83 - 83)  RR: 16 (02 Jan 2024 10:27) (16 - 16)  SpO2: 100% (02 Jan 2024 10:27) (97% - 100%)    Parameters below as of 02 Jan 2024 10:27  Patient On (Oxygen Delivery Method): room air    PE:  AA&0 x 2, CN 2-12 grossly intact, speech clear, follows commands, PERRL  Motor:  Sensory:      LABS:                        12.5   8.03  )-----------( 322      ( 01 Jan 2024 21:00 )             37.9     01-01    137  |  101  |  7   ----------------------------<  105<H>  3.8   |  22  |  0.89    Ca    8.9      01 Jan 2024 21:00    TPro  7.1  /  Alb  3.7  /  TBili  0.5  /  DBili  x   /  AST  18  /  ALT  17  /  AlkPhos  175<H>  01-01      Urinalysis Basic - ( 01 Jan 2024 21:00 )    Color: x / Appearance: x / SG: x / pH: x  Gluc: 105 mg/dL / Ketone: x  / Bili: x / Urobili: x   Blood: x / Protein: x / Nitrite: x   Leuk Esterase: x / RBC: x / WBC x   Sq Epi: x / Non Sq Epi: x / Bacteria: x             HPI:  78F PMH HTN, CVA (2012; residual LLE weakness, uses walker) with recent presentation to [facility] with multiple falls now presenting to [facility] for severe headache and agitation x1 day. Pt accompanied by daughter who is able to give most of the history. Patient had a fall about 1 week ago while getting out of bed and injured her LLE. Since then patient has had multiple falls over the past week, the most recent of which was on 12/31 when patient fell back and hit her head. Patient did not have LOC but presented to [facility]. At [facility] ED patient had CT head that showed no bleed and CTA H and N that was showed 5mm R pericallosal aneurysm. Pt and daughter report that this aneurysm was diagnosed 15 years ago and has been being monitored with periodic CT scans but admit that they may not have been following up as much recently. Pt was discharged after refusing to work with PT. The next AM, pt awoke with severe frontal HA with photophobia. Daughter also reports that patient was agitated and disoriented. Baseline mental status is conversive and AAOx2-3. Denies fever, chills, chest pain, sob, new focal weakness, nausea, vomiting. Was able to tolerate PO. Patient now back at baseline mental status when interviewed in the ED. Reports HA resolved. Denies other symptoms. LLE pain has been improving and is located in the back of the L thigh, no radiation. No back pain or changes in bowel or bladder habits.  	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    PAST MEDICAL & SURGICAL HISTORY:  CVA (cerebrovascular accident)  HTN (hypertension)    Allergies  No Known Allergies    MEDICATIONS  (STANDING):  amLODIPine   Tablet 10 milliGRAM(s) Oral daily  atorvastatin 40 milliGRAM(s) Oral at bedtime  enoxaparin Injectable 40 milliGRAM(s) SubCutaneous every 24 hours  losartan 25 milliGRAM(s) Oral daily  mometasone 220 MICROgram(s) Inhaler 1 Puff(s) Inhalation daily  pantoprazole    Tablet 40 milliGRAM(s) Oral before breakfast    MEDICATIONS  (PRN):  acetaminophen     Tablet .. 650 milliGRAM(s) Oral every 6 hours PRN Temp greater or equal to 38C (100.4F), Mild Pain (1 - 3)  albuterol    90 MICROgram(s) HFA Inhaler 2 Puff(s) Inhalation every 6 hours PRN for shortness of breath and/or wheezing  benzonatate 100 milliGRAM(s) Oral three times a day PRN for cough  melatonin 3 milliGRAM(s) Oral at bedtime PRN Insomnia    Vital Signs Last 24 Hrs  T(C): 37 (02 Jan 2024 10:27), Max: 37.7 (01 Jan 2024 21:57)  T(F): 98.6 (02 Jan 2024 10:27), Max: 99.9 (01 Jan 2024 21:57)  HR: 79 (02 Jan 2024 10:27) (60 - 90)  BP: 126/68 (02 Jan 2024 10:27) (126/68 - 183/109)  BP(mean): 83 (02 Jan 2024 10:27) (83 - 83)  RR: 16 (02 Jan 2024 10:27) (16 - 16)  SpO2: 100% (02 Jan 2024 10:27) (97% - 100%)    Parameters below as of 02 Jan 2024 10:27  Patient On (Oxygen Delivery Method): room air    PE:  AA&0 x 2, CN 2-12 grossly intact, speech clear, follows commands, PERRL  Motor:  Sensory:      LABS:                        12.5   8.03  )-----------( 322      ( 01 Jan 2024 21:00 )             37.9     01-01    137  |  101  |  7   ----------------------------<  105<H>  3.8   |  22  |  0.89    Ca    8.9      01 Jan 2024 21:00    TPro  7.1  /  Alb  3.7  /  TBili  0.5  /  DBili  x   /  AST  18  /  ALT  17  /  AlkPhos  175<H>  01-01      Urinalysis Basic - ( 01 Jan 2024 21:00 )    Color: x / Appearance: x / SG: x / pH: x  Gluc: 105 mg/dL / Ketone: x  / Bili: x / Urobili: x   Blood: x / Protein: x / Nitrite: x   Leuk Esterase: x / RBC: x / WBC x   Sq Epi: x / Non Sq Epi: x / Bacteria: x             HPI:  78F PMH HTN, CVA (2012; residual LLE weakness, uses walker) with recent presentation to [facility] with multiple falls now presenting to [facility] for severe headache and agitation x1 day. Pt accompanied by daughter who is able to give most of the history. Patient had a fall about 1 week ago while getting out of bed and injured her LLE. Since then patient has had multiple falls over the past week, the most recent of which was on 12/31 when patient fell back and hit her head. Patient did not have LOC but presented to [facility]. 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 Pt and daughter report that this aneurysm was diagnosed 15 years ago and has been being monitored with periodic CT scans but admit that they may not have been following up as much recently. Pt was discharged after refusing to work with PT. The next AM, pt awoke with severe frontal HA with photophobia. Daughter also reports that patient was agitated and disoriented. Baseline mental status is conversive and AAOx2-3. Denies fever, chills, chest pain, sob, new focal weakness, nausea, vomiting. Was able to tolerate PO. Patient now back at baseline mental status when interviewed in the ED. Reports HA resolved. Denies other symptoms. LLE pain has been improving and is located in the back of the L thigh, no radiation. No back pain or changes in bowel or bladder habits.  	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     PAST MEDICAL & SURGICAL HISTORY:  CVA (cerebrovascular accident)  HTN (hypertension)    Allergies  No Known Allergies    MEDICATIONS  (STANDING):  amLODIPine   Tablet 10 milliGRAM(s) Oral daily  atorvastatin 40 milliGRAM(s) Oral at bedtime  enoxaparin Injectable 40 milliGRAM(s) SubCutaneous every 24 hours  losartan 25 milliGRAM(s) Oral daily  mometasone 220 MICROgram(s) Inhaler 1 Puff(s) Inhalation daily  pantoprazole    Tablet 40 milliGRAM(s) Oral before breakfast    MEDICATIONS  (PRN):  acetaminophen     Tablet .. 650 milliGRAM(s) Oral every 6 hours PRN Temp greater or equal to 38C (100.4F), Mild Pain (1 - 3)  albuterol    90 MICROgram(s) HFA Inhaler 2 Puff(s) Inhalation every 6 hours PRN for shortness of breath and/or wheezing  benzonatate 100 milliGRAM(s) Oral three times a day PRN for cough  melatonin 3 milliGRAM(s) Oral at bedtime PRN Insomnia    Vital Signs Last 24 Hrs  T(C): 37 (02 Jan 2024 10:27), Max: 37.7 (01 Jan 2024 21:57)  T(F): 98.6 (02 Jan 2024 10:27), Max: 99.9 (01 Jan 2024 21:57)  HR: 79 (02 Jan 2024 10:27) (60 - 90)  BP: 126/68 (02 Jan 2024 10:27) (126/68 - 183/109)  BP(mean): 83 (02 Jan 2024 10:27) (83 - 83)  RR: 16 (02 Jan 2024 10:27) (16 - 16)  SpO2: 100% (02 Jan 2024 10:27) (97% - 100%)    Parameters below as of 02 Jan 2024 10:27  Patient On (Oxygen Delivery Method): room air    PE:  AA&0 x 3, CN 2-12 grossly intact, speech clear, follows commands, PERRL  Motor: strength BUE 5/5, RLE; HF/HA 3/5, KF/KE 4/5, PF DF 5/5,   LLE: Hf/HA 2/5, KF/kE 3/5, PF/DF 5/5  Sensory: SILT, 1 beat clonus, LLE, negative hoffmans      LABS:                        12.5   8.03  )-----------( 322      ( 01 Jan 2024 21:00 )             37.9     01-01    137  |  101  |  7   ----------------------------<  105<H>  3.8   |  22  |  0.89    Ca    8.9      01 Jan 2024 21:00    TPro  7.1  /  Alb  3.7  /  TBili  0.5  /  DBili  x   /  AST  18  /  ALT  17  /  AlkPhos  175<H>  01-01      Urinalysis Basic - ( 01 Jan 2024 21:00 )    Color: x / Appearance: x / SG: x / pH: x  Gluc: 105 mg/dL / Ketone: x  / Bili: x / Urobili: x   Blood: x / Protein: x / Nitrite: x   Leuk Esterase: x / RBC: x / WBC x   Sq Epi: x / Non Sq Epi: x / Bacteria: x             HPI:  78F PMH HTN, CVA (2012; residual LLE weakness, uses walker) with recent presentation to [facility] with multiple falls now presenting to [facility] for severe headache and agitation x1 day. Pt accompanied by daughter who is able to give most of the history. Patient had a fall about 1 week ago while getting out of bed and injured her LLE. Since then patient has had multiple falls over the past week, the most recent of which was on 12/31 when patient fell back and hit her head. Patient did not have LOC but presented to [facility]. 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 Pt and daughter report that this aneurysm was diagnosed 15 years ago and has been being monitored with periodic CT scans but admit that they may not have been following up as much recently. Pt was discharged after refusing to work with PT. The next AM, pt awoke with severe frontal HA with photophobia. Daughter also reports that patient was agitated and disoriented. Baseline mental status is conversive and AAOx2-3. Denies fever, chills, chest pain, sob, new focal weakness, nausea, vomiting. Was able to tolerate PO. Patient now back at baseline mental status when interviewed in the ED. Reports HA resolved. Denies other symptoms. LLE pain has been improving and is located in the back of the L thigh, no radiation. No back pain or changes in bowel or bladder habits.  	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     PAST MEDICAL & SURGICAL HISTORY:  CVA (cerebrovascular accident)  HTN (hypertension)    Allergies  No Known Allergies    MEDICATIONS  (STANDING):  amLODIPine   Tablet 10 milliGRAM(s) Oral daily  atorvastatin 40 milliGRAM(s) Oral at bedtime  enoxaparin Injectable 40 milliGRAM(s) SubCutaneous every 24 hours  losartan 25 milliGRAM(s) Oral daily  mometasone 220 MICROgram(s) Inhaler 1 Puff(s) Inhalation daily  pantoprazole    Tablet 40 milliGRAM(s) Oral before breakfast    MEDICATIONS  (PRN):  acetaminophen     Tablet .. 650 milliGRAM(s) Oral every 6 hours PRN Temp greater or equal to 38C (100.4F), Mild Pain (1 - 3)  albuterol    90 MICROgram(s) HFA Inhaler 2 Puff(s) Inhalation every 6 hours PRN for shortness of breath and/or wheezing  benzonatate 100 milliGRAM(s) Oral three times a day PRN for cough  melatonin 3 milliGRAM(s) Oral at bedtime PRN Insomnia    Vital Signs Last 24 Hrs  T(C): 37 (02 Jan 2024 10:27), Max: 37.7 (01 Jan 2024 21:57)  T(F): 98.6 (02 Jan 2024 10:27), Max: 99.9 (01 Jan 2024 21:57)  HR: 79 (02 Jan 2024 10:27) (60 - 90)  BP: 126/68 (02 Jan 2024 10:27) (126/68 - 183/109)  BP(mean): 83 (02 Jan 2024 10:27) (83 - 83)  RR: 16 (02 Jan 2024 10:27) (16 - 16)  SpO2: 100% (02 Jan 2024 10:27) (97% - 100%)    Parameters below as of 02 Jan 2024 10:27  Patient On (Oxygen Delivery Method): room air    PE:  AA&0 x 3, CN 2-12 grossly intact, speech clear, follows commands, PERRL  Motor: strength BUE 5/5, RLE; HF/HA 3/5, KF/KE 4/5, PF DF 5/5,   LLE: Hf/HA 2/5, KF/kE 3/5, PF/DF 5/5  Sensory: SILT, 1 beat clonus, LLE, negative hoffmans      LABS:                        12.5   8.03  )-----------( 322      ( 01 Jan 2024 21:00 )             37.9     01-01    137  |  101  |  7   ----------------------------<  105<H>  3.8   |  22  |  0.89    Ca    8.9      01 Jan 2024 21:00    TPro  7.1  /  Alb  3.7  /  TBili  0.5  /  DBili  x   /  AST  18  /  ALT  17  /  AlkPhos  175<H>  01-01      Urinalysis Basic - ( 01 Jan 2024 21:00 )    Color: x / Appearance: x / SG: x / pH: x  Gluc: 105 mg/dL / Ketone: x  / Bili: x / Urobili: x   Blood: x / Protein: x / Nitrite: x   Leuk Esterase: x / RBC: x / WBC x   Sq Epi: x / Non Sq Epi: x / Bacteria: x

## 2024-01-02 NOTE — H&P ADULT - PROBLEM SELECTOR PLAN 5
Patient reports developing pain in back of thigh after falling out of bed about 1 week ago. Denies any back pain or pain radiating down leg. No changes in bowel or bladder habits.  - unlikely to be neuropathic given no back pain  - pt reports that mild LLE weakness compared to RLE is at baseline 2/2 previous CVA  - monitor - as above  - f/u PT

## 2024-01-02 NOTE — H&P ADULT - PROBLEM SELECTOR PLAN 10
- defer lovenox pending NSx recs. SCDs for now  - NPO pending NSx recs - lovenox - incidental L axillary lymph node enlargement on CT at [place]  - daughter made aware  - f/u PCP o/p - incidental L axillary lymph node enlargement on CT at [place]  - daughter made aware  - f/u PCP o/p

## 2024-01-02 NOTE — H&P ADULT - PROBLEM SELECTOR PLAN 3
Baseline mental status is conversive and AAOx2-3. Now back to baseline now that headache resolved.  - delirium likely 2/2 pain - CTH: 2.2 x 1.5 x 2 cm extra-axial calcific focus in the left parafalcine region, probably meningioma.  - discussed with neurosurgery, seen on CTH from 11 years ago as well  Plan:  - f/u o/p

## 2024-01-02 NOTE — H&P ADULT - PROBLEM SELECTOR PLAN 1
Had a mechanical fall about 1 week prior to admission while getting out of bed and injured her LLE. Since then with multiple mechanical falls, most recently 12/31 with headstrike w/o LOC. 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 Aneurysm known to patient and daughter and was diagnosed 15 years ago and has been being monitored with periodic CT scans but admit that they may not have been following up as much recently. Pt was discharged after refusing to work with PT. The next AM, pt awoke with severe frontal HA with photophobia associated with disorientation and agitation. No new focal weakness, nausea, vomiting and was able to tolerate PO. Patient now back at baseline mental status when interviewed in the ED and HA resolved.  - CTH: 2.2 x 1.5 x 2cm meningioma  - neuro exam at baseline (mild LLE weakness compared to RLE)  Plan:  - possibly post concussive HA with delirium brought on by pain  - f/u neurosurgery recs given hx of aneurysm. would also appreciate input on meningioma  - f/u PT  - hold lovenox pending neurosurgery recs Had a mechanical fall about 1 week prior to admission while getting out of bed and injured her LLE. Since then with multiple mechanical falls, most recently 12/31 with headstrike w/o LOC. Presented to [facility] ED with CT head that showed no bleed and CTA H and N that was showed 5mm R pericallosal aneurysm. Aneurysm known to patient and daughter and was diagnosed 15 years ago and has been being monitored with periodic CT scans but admit that they may not have been following up as much recently. Pt was discharged after refusing to work with PT. The next AM, pt awoke with severe frontal HA with photophobia associated with disorientation and agitation. No new focal weakness, nausea, vomiting and was able to tolerate PO. Patient now back at baseline mental status when interviewed in the ED and HA resolved.  - CTH: 2.2 x 1.5 x 2cm meningioma  - neuro exam at baseline (mild LLE weakness compared to RLE)  Plan:  - possibly post concussive HA with delirium brought on by pain  - f/u neurosurgery recs given hx of aneurysm. would also appreciate input on meningioma  - f/u PT  - hold lovenox pending neurosurgery recs Had a mechanical fall about 1 week prior to admission while getting out of bed and injured her LLE. Since then with multiple mechanical falls, most recently 12/31 with headstrike w/o LOC. Presented to [facility] ED with CT head that showed no bleed and CTA H and N that was showed 5mm R pericallosal aneurysm. Aneurysm known to patient and daughter and was diagnosed 15 years ago and has been being monitored with periodic CT scans but admit that they may not have been following up as much recently. Pt was discharged after refusing to work with PT. The next AM, pt awoke with severe frontal HA with photophobia associated with disorientation and agitation. No new focal weakness, nausea, vomiting and was able to tolerate PO. Patient now back at baseline mental status when interviewed in the ED and HA resolved.  - CTH: 2.2 x 1.5 x 2cm meningioma  - neuro exam at baseline (mild LLE weakness compared to RLE)  Plan:  - possibly post concussive HA with delirium brought on by pain. discussed with neurosurgery, report that since it is a known aneurysm and no hemorrhage on scan with baseline neuro exam, unlikely to be 2/2 hemorrhage or aneurysm rupture. recommend o/p f/u for aneurysm monitoring  - f/u PT  - q4 neuro checks. stat head CT for any change in status. Had a mechanical fall about 1 week prior to admission while getting out of bed and injured her LLE. Since then with multiple mechanical falls, most recently 12/31 with headstrike w/o LOC. Presented to [facility] ED with CT head that showed no bleed and CTA H and N that was showed 5mm R pericallosal aneurysm. Aneurysm known to patient and daughter and was diagnosed 15 years ago and has been being monitored with periodic CT scans but admit that they may not have been following up as much recently. Pt was discharged after refusing to work with PT. The next AM, pt awoke with severe frontal HA with photophobia associated with disorientation and agitation. No new focal weakness, nausea, vomiting and was able to tolerate PO. Patient now back at baseline mental status when interviewed in the ED and HA resolved.  - CTH: 2.2 x 1.5 x 2cm meningioma  - neuro exam at baseline (mild LLE weakness compared to RLE)  Plan:  - possibly post concussive HA with delirium brought on by pain. discussed with neurosurgery, report that since it is a known aneurysm and no hemorrhage on scan with baseline neuro exam, unlikely to be 2/2 hemorrhage or aneurysm rupture. recommend o/p f/u for aneurysm monitoring  - f/u PT  - q4 neuro checks. stat head CT for any change in status. Had a mechanical fall about 1 week prior to admission while getting out of bed and injured her LLE. Since then with multiple mechanical falls, most recently 12/31 with headstrike w/o LOC. Presented to [facility] ED with CT head that showed no bleed and CTA H and N that was showed 5mm R pericallosal aneurysm. Aneurysm known to patient and daughter and was diagnosed 15 years ago and has been being monitored with periodic CT scans but admit that they may not have been following up as much recently. Pt was discharged after refusing to work with PT. The next AM, pt awoke with severe frontal HA with photophobia associated with disorientation and agitation. No new focal weakness, nausea, vomiting and was able to tolerate PO. Patient now back at baseline mental status when interviewed in the ED and HA resolved.  - CTH: 2.2 x 1.5 x 2cm meningioma  - neuro exam at baseline (mild LLE weakness compared to RLE)  Plan:  - likely post concussive HA with delirium brought on by pain. less likely to be related to aneurysm given CTA head that showed aneurysm was after the headstrike. discussed with neurosurgery, report that since it is a known aneurysm and no hemorrhage on scan with baseline neuro exam, unlikely to be 2/2 hemorrhage or aneurysm rupture. recommend o/p f/u for aneurysm monitoring  - f/u PT  - q4 neuro checks. stat head CT for any change in status. Had a mechanical fall about 1 week prior to admission while getting out of bed and injured her LLE. Since then with multiple mechanical falls, most recently 12/31 with headstrike w/o LOC. Presented to [facility] ED with CT head that showed no bleed and CTA H and N that was showed 5mm R pericallosal aneurysm. Aneurysm known to patient and daughter and was diagnosed 15 years ago and has been being monitored with periodic CT scans but admit that they may not have been following up as much recently. Pt was discharged after refusing to work with PT. The next AM, pt awoke with severe frontal HA with photophobia associated with disorientation and agitation. No new focal weakness, nausea, vomiting and was able to tolerate PO. Patient now back at baseline mental status when interviewed in the ED and HA resolved.  - CTH: 2.2 x 1.5 x 2cm meningioma  - neuro exam at baseline (mild LLE weakness compared to RLE)  Plan:  - likely post concussive HA with delirium brought on by pain. less likely to be related to aneurysm given CTA head that showed aneurysm was after the headstrike. discussed with neurosurgery, report that since it is a known aneurysm and no hemorrhage on scan with baseline neuro exam, unlikely to be 2/2 hemorrhage or aneurysm rupture. recommend o/p f/u for aneurysm monitoring  - f/u PT  - q4 neuro checks. stat head CT for any change in status.

## 2024-01-02 NOTE — CONSULT NOTE ADULT - ATTENDING COMMENTS
Unruptured aneurysm along right ERIC, supposedly known about for some time. Patient needs to track down old imaging for comparison. no acute intervention, can discuss outpatient once old imaging is acquired.

## 2024-01-02 NOTE — CONSULT NOTE ADULT - ASSESSMENT
77 y/o F, with known h/o meningioma and cerebral aneurysm, p.w multiple falls, headache and agitation,  CT head shows no acute findings, likely symptoms post concussive 79 y/o F, with known h/o meningioma and cerebral aneurysm, p.w multiple falls, headache and agitation,  CT head shows no acute findings, likely symptoms post concussive

## 2024-01-02 NOTE — H&P ADULT - PROBLEM SELECTOR PLAN 9
- incidental L axillary lymph node enlargement on CT at [place]  - daughter made aware  - f/u PCP o/p - incidental L axillary lymph node enlargement on CT at [place]  - daughter made aware  - f/u PCP o/p CT c-spine at [place] with 1.3cm soft tissue nodule in L parotid  - daughter made aware  - f/u o/p ENT CT c-spine at [place] with 1.3cm soft tissue nodule in L parotid  - daughter made aware  - f/u o/p ENT

## 2024-01-02 NOTE — PATIENT PROFILE ADULT - FALL HARM RISK - HARM RISK INTERVENTIONS
Assistance with ambulation/Assistance OOB with selected safe patient handling equipment/Communicate Risk of Fall with Harm to all staff/Discuss with provider need for PT consult/Monitor gait and stability/Provide patient with walking aids - walker, cane, crutches/Reinforce activity limits and safety measures with patient and family/Tailored Fall Risk Interventions/Visual Cue: Yellow wristband and red socks/Bed in lowest position, wheels locked, appropriate side rails in place/Call bell, personal items and telephone in reach/Instruct patient to call for assistance before getting out of bed or chair/Non-slip footwear when patient is out of bed/[place] to call system/Physically safe environment - no spills, clutter or unnecessary equipment/Purposeful Proactive Rounding/Room/bathroom lighting operational, light cord in reach Assistance with ambulation/Assistance OOB with selected safe patient handling equipment/Communicate Risk of Fall with Harm to all staff/Discuss with provider need for PT consult/Monitor gait and stability/Provide patient with walking aids - walker, cane, crutches/Reinforce activity limits and safety measures with patient and family/Tailored Fall Risk Interventions/Visual Cue: Yellow wristband and red socks/Bed in lowest position, wheels locked, appropriate side rails in place/Call bell, personal items and telephone in reach/Instruct patient to call for assistance before getting out of bed or chair/Non-slip footwear when patient is out of bed/[place] to call system/Physically safe environment - no spills, clutter or unnecessary equipment/Purposeful Proactive Rounding/Room/bathroom lighting operational, light cord in reach

## 2024-01-02 NOTE — H&P ADULT - PROBLEM SELECTOR PLAN 8
CT c-spine at [place] with 1.3cm soft tissue nodule in L parotid  - daughter made aware  - f/u o/p ENT CT c-spine at [place] with 1.3cm soft tissue nodule in L parotid  - daughter made aware  - f/u o/p ENT - pt's daughter reports that she will sometimes wheeze when she gets "excited." No formal diagnosis of asthma or COPD. currently breathing comfortably on RA  Plan:  - continue home flovent converted to mometasone while i/p  - albuterol prn

## 2024-01-02 NOTE — PATIENT PROFILE ADULT - OVER THE PAST TWO WEEKS, HAVE YOU FELT LITTLE INTEREST OR PLEASURE IN DOING THINGS?
----- Message from Isrrael Casanova sent at 11/8/2019  4:18 PM EST -----  Regarding: Dr Johanne Whitney: [phone]  Patient is going to run out of medication and mom is concern about the side effects of not having the medication, because no insurance and the cost of super costly. Mom wants to have a call back before the end the of the day. Mom says she called early today.  Please advise    [phone] no

## 2024-01-02 NOTE — H&P ADULT - NSHPSOCIALHISTORY_GEN_ALL_CORE
Lives with .  is having issues caring for her on his own and she is now a 2 person assist to get up. Ambulates with walker with multiple falls.
No

## 2024-01-02 NOTE — H&P ADULT - PROBLEM SELECTOR PLAN 7
- pt's daughter reports that she will sometimes wheeze when she gets "excited." No formal diagnosis of asthma or COPD. currently breathing comfortably on RA  Plan:  - continue home flovent converted to mometasone while i/p  - albuterol prn - continue home amlodipine 10mg, losartan 25mg

## 2024-01-02 NOTE — H&P ADULT - TIME BILLING
Time-based billing (NON-critical care).     More than 50% of the visit was spent counseling and / or coordinating care by the attending physician.      The necessity of the time spent during the encounter on this date of service was due to: documentation in [place], reviewing chart and coordinating care with patient/ACPs and interdisciplinary staff (such as , social workers, etc) as well as reviewing vitals, laboratory data, radiology, medication list, and prior records. Interventions were performed as documented above. Time-based billing (NON-critical care).     More than 50% of the visit was spent counseling and / or coordinating care by the attending physician.      The necessity of the time spent during the encounter on this date of service was due to: documentation in [place], reviewing chart and coordinating care with patient/ACPs and interdisciplinary staff (such as , social workers, etc) as well as reviewing vitals, laboratory data, radiology, medication list, and prior records. Interventions were performed as documented above.

## 2024-01-02 NOTE — H&P ADULT - HISTORY OF PRESENT ILLNESS
78F PMH HTN, CVA (2012; residual LLE weakness, uses walker) with recent presentation to [facility] with multiple falls now presenting to [facility] for severe headache and agitation x1 day. Pt accompanied by daughter who is able to give most of the history. Patient had a fall about 1 week ago while getting out of bed and injured her LLE. Since then patient has had multiple falls over the past week, the most recent of which was on 12/31 when patient fell back and hit her head. Patient did not have LOC but presented to [facility]. At [facility] ED patient had CT head that showed no bleed and CTA H and N that was showed 5mm R pericallosal aneurysm. Pt and daughter report that this aneurysm was diagnosed 15 years ago and has been being monitored with periodic CT scans but admit that they may not have been following up as much recently. Pt was discharged after refusing to work with PT. The next AM, pt awoke with severe frontal HA with photophobia. Daughter also reports that patient was agitated and disoriented. Baseline mental status is conversive and AAOx2-3. Denies fever, chills, chest pain, sob, new focal weakness, nausea, vomiting. Was able to tolerate PO. Patient now back at baseline mental status when interviewed in the ED. Reports HA resolved. Denies other symptoms. LLE pain has been improving and is located in the back of the L thigh, no radiation. No back pain or changes in bowel or bladder habits.    	([facility] MRN#: [id number]) 78F PMH HTN, CVA (2012; residual LLE weakness, uses walker) with recent presentation to [facility] with multiple falls now presenting to [facility] for severe headache and agitation x1 day. Pt accompanied by daughter who is able to give most of the history. Patient had a fall about 1 week ago while getting out of bed and injured her LLE. Since then patient has had multiple falls over the past week, the most recent of which was on 12/31 when patient fell back and hit her head. Patient did not have LOC but presented to [facility]. At [facility] ED patient had CT head that showed no bleed and CTA H and N that was showed 5mm R pericallosal aneurysm. Pt and daughter report that this aneurysm was diagnosed 15 years ago and has been being monitored with periodic CT scans but admit that they may not have been following up as much recently. Pt was discharged after refusing to work with PT. The next AM, pt awoke with severe frontal HA with photophobia. Daughter also reports that patient was agitated and disoriented. Baseline mental status is conversive and AAOx2-3. Denies fever, chills, chest pain, sob, new focal weakness, nausea, vomiting. Was able to tolerate PO. Patient now back at baseline mental status when interviewed in the ED. Reports HA resolved. Denies other symptoms. LLE pain has been improving and is located in the back of the L thigh, no radiation. No back pain or changes in bowel or bladder habits.    	([facility] MRN#: [id number])

## 2024-01-02 NOTE — H&P ADULT - NSHPLABSRESULTS_GEN_ALL_CORE
LABS:                         12.5   8.03  )-----------( 322      ( 01 Jan 2024 21:00 )             37.9     01-01    137  |  101  |  7   ----------------------------<  105<H>  3.8   |  22  |  0.89    Ca    8.9      01 Jan 2024 21:00    TPro  7.1  /  Alb  3.7  /  TBili  0.5  /  DBili  x   /  AST  18  /  ALT  17  /  AlkPhos  175<H>  01-01      Urinalysis Basic - ( 01 Jan 2024 21:00 )    Color: x / Appearance: x / SG: x / pH: x  Gluc: 105 mg/dL / Ketone: x  / Bili: x / Urobili: x   Blood: x / Protein: x / Nitrite: x   Leuk Esterase: x / RBC: x / WBC x   Sq Epi: x / Non Sq Epi: x / Bacteria: x                RADIOLOGY, EKG & ADDITIONAL TESTS: Reviewed.

## 2024-01-02 NOTE — ED ADULT NURSE REASSESSMENT NOTE - NS ED NURSE REASSESS COMMENT FT1
pt a&ox3, turned, cleaned and repositioned. pt pending bed assignment. pt appears in NAD, safety maintained

## 2024-01-02 NOTE — H&P ADULT - NSHPPHYSICALEXAM_GEN_ALL_CORE
VITAL SIGNS:  T(C): 37 (01-02-24 @ 10:27), Max: 37.7 (01-01-24 @ 21:57)  T(F): 98.6 (01-02-24 @ 10:27), Max: 99.9 (01-01-24 @ 21:57)  HR: 79 (01-02-24 @ 10:27) (60 - 90)  BP: 126/68 (01-02-24 @ 10:27) (126/68 - 183/109)  BP(mean): 83 (01-02-24 @ 10:27) (83 - 83)  RR: 16 (01-02-24 @ 10:27) (16 - 16)  SpO2: 100% (01-02-24 @ 10:27) (97% - 100%)  Wt(kg): --    PHYSICAL EXAM:    Constitutional: NAD; resting comfortably in bed  Head: NC/AT  Eyes: PERRL, EOMI, anicteric sclera  ENT: no nasal discharge; MMM  Neck: supple  Respiratory: CTA B/L; no W/R/R, no retractions  Cardiac: +S1/S2; RRR; no M/R/G  Gastrointestinal: soft, NT/ND; no rebound or guarding  Back: spine midline, no bony tenderness or step-offs  Extremities: WWP, no clubbing or cyanosis; no peripheral edema  Musculoskeletal: pain in back of the leg with L hip flexion  Vascular: 2+ radial, DP pulses B/L  Neurologic: AAOx2-3; knows why she's in the hospital, knew that it was recently new years but thought the year was 1984; CNII-XII intact. strength 5/5 in b/l elbow flexion/extension and hand ; able to bend both knees while lying in bed but unable to raise legs off bed with hip flexion; 5/5 dorsiflexion and plantarflexion b/l; sensation intact to light touch b/l  Psychiatric: affect and characteristics of appearance, verbalizations, behaviors are appropriate

## 2024-01-02 NOTE — H&P ADULT - PROBLEM SELECTOR PLAN 4
- as above  - f/u PT Baseline mental status is conversive and AAOx2-3. Now back to baseline now that headache resolved.  - delirium likely 2/2 pain

## 2024-01-02 NOTE — CONSULT NOTE ADULT - PROBLEM SELECTOR RECOMMENDATION 9
- no acute neurosurgery  - case to be dw attending. - no acute neurosurgery  - given exam findings would consider MRI cervical spine w/o nikhil to r/o stenosis and spine consult if positive findings.   - case to be dw attending.

## 2024-01-03 LAB
-  AMOXICILLIN/CLAVULANIC ACID: SIGNIFICANT CHANGE UP
-  AMOXICILLIN/CLAVULANIC ACID: SIGNIFICANT CHANGE UP
-  AMPICILLIN/SULBACTAM: SIGNIFICANT CHANGE UP
-  AMPICILLIN/SULBACTAM: SIGNIFICANT CHANGE UP
-  AMPICILLIN: SIGNIFICANT CHANGE UP
-  AMPICILLIN: SIGNIFICANT CHANGE UP
-  AZTREONAM: SIGNIFICANT CHANGE UP
-  AZTREONAM: SIGNIFICANT CHANGE UP
-  CEFAZOLIN: SIGNIFICANT CHANGE UP
-  CEFAZOLIN: SIGNIFICANT CHANGE UP
-  CEFEPIME: SIGNIFICANT CHANGE UP
-  CEFEPIME: SIGNIFICANT CHANGE UP
-  CEFOXITIN: SIGNIFICANT CHANGE UP
-  CEFOXITIN: SIGNIFICANT CHANGE UP
-  CEFTRIAXONE: SIGNIFICANT CHANGE UP
-  CEFTRIAXONE: SIGNIFICANT CHANGE UP
-  CEFUROXIME: SIGNIFICANT CHANGE UP
-  CEFUROXIME: SIGNIFICANT CHANGE UP
-  CIPROFLOXACIN: SIGNIFICANT CHANGE UP
-  CIPROFLOXACIN: SIGNIFICANT CHANGE UP
-  ERTAPENEM: SIGNIFICANT CHANGE UP
-  ERTAPENEM: SIGNIFICANT CHANGE UP
-  GENTAMICIN: SIGNIFICANT CHANGE UP
-  GENTAMICIN: SIGNIFICANT CHANGE UP
-  IMIPENEM: SIGNIFICANT CHANGE UP
-  IMIPENEM: SIGNIFICANT CHANGE UP
-  LEVOFLOXACIN: SIGNIFICANT CHANGE UP
-  LEVOFLOXACIN: SIGNIFICANT CHANGE UP
-  MEROPENEM: SIGNIFICANT CHANGE UP
-  MEROPENEM: SIGNIFICANT CHANGE UP
-  NITROFURANTOIN: SIGNIFICANT CHANGE UP
-  NITROFURANTOIN: SIGNIFICANT CHANGE UP
-  PIPERACILLIN/TAZOBACTAM: SIGNIFICANT CHANGE UP
-  PIPERACILLIN/TAZOBACTAM: SIGNIFICANT CHANGE UP
-  TOBRAMYCIN: SIGNIFICANT CHANGE UP
-  TOBRAMYCIN: SIGNIFICANT CHANGE UP
-  TRIMETHOPRIM/SULFAMETHOXAZOLE: SIGNIFICANT CHANGE UP
-  TRIMETHOPRIM/SULFAMETHOXAZOLE: SIGNIFICANT CHANGE UP
ALBUMIN SERPL ELPH-MCNC: 3.6 G/DL — SIGNIFICANT CHANGE UP (ref 3.3–5)
ALBUMIN SERPL ELPH-MCNC: 3.6 G/DL — SIGNIFICANT CHANGE UP (ref 3.3–5)
ALP SERPL-CCNC: 149 U/L — HIGH (ref 40–120)
ALP SERPL-CCNC: 149 U/L — HIGH (ref 40–120)
ALT FLD-CCNC: 17 U/L — SIGNIFICANT CHANGE UP (ref 4–33)
ALT FLD-CCNC: 17 U/L — SIGNIFICANT CHANGE UP (ref 4–33)
ANION GAP SERPL CALC-SCNC: 14 MMOL/L — SIGNIFICANT CHANGE UP (ref 7–14)
ANION GAP SERPL CALC-SCNC: 14 MMOL/L — SIGNIFICANT CHANGE UP (ref 7–14)
AST SERPL-CCNC: 14 U/L — SIGNIFICANT CHANGE UP (ref 4–32)
AST SERPL-CCNC: 14 U/L — SIGNIFICANT CHANGE UP (ref 4–32)
BILIRUB SERPL-MCNC: 0.5 MG/DL — SIGNIFICANT CHANGE UP (ref 0.2–1.2)
BILIRUB SERPL-MCNC: 0.5 MG/DL — SIGNIFICANT CHANGE UP (ref 0.2–1.2)
BUN SERPL-MCNC: 16 MG/DL — SIGNIFICANT CHANGE UP (ref 7–23)
BUN SERPL-MCNC: 16 MG/DL — SIGNIFICANT CHANGE UP (ref 7–23)
CALCIUM SERPL-MCNC: 9 MG/DL — SIGNIFICANT CHANGE UP (ref 8.4–10.5)
CALCIUM SERPL-MCNC: 9 MG/DL — SIGNIFICANT CHANGE UP (ref 8.4–10.5)
CHLORIDE SERPL-SCNC: 102 MMOL/L — SIGNIFICANT CHANGE UP (ref 98–107)
CHLORIDE SERPL-SCNC: 102 MMOL/L — SIGNIFICANT CHANGE UP (ref 98–107)
CO2 SERPL-SCNC: 25 MMOL/L — SIGNIFICANT CHANGE UP (ref 22–31)
CO2 SERPL-SCNC: 25 MMOL/L — SIGNIFICANT CHANGE UP (ref 22–31)
CREAT SERPL-MCNC: 1.11 MG/DL — SIGNIFICANT CHANGE UP (ref 0.5–1.3)
CREAT SERPL-MCNC: 1.11 MG/DL — SIGNIFICANT CHANGE UP (ref 0.5–1.3)
CULTURE RESULTS: ABNORMAL
CULTURE RESULTS: ABNORMAL
EGFR: 51 ML/MIN/1.73M2 — LOW
EGFR: 51 ML/MIN/1.73M2 — LOW
GLUCOSE SERPL-MCNC: 111 MG/DL — HIGH (ref 70–99)
GLUCOSE SERPL-MCNC: 111 MG/DL — HIGH (ref 70–99)
HCT VFR BLD CALC: 35.7 % — SIGNIFICANT CHANGE UP (ref 34.5–45)
HCT VFR BLD CALC: 35.7 % — SIGNIFICANT CHANGE UP (ref 34.5–45)
HGB BLD-MCNC: 11.9 G/DL — SIGNIFICANT CHANGE UP (ref 11.5–15.5)
HGB BLD-MCNC: 11.9 G/DL — SIGNIFICANT CHANGE UP (ref 11.5–15.5)
MAGNESIUM SERPL-MCNC: 2.1 MG/DL — SIGNIFICANT CHANGE UP (ref 1.6–2.6)
MAGNESIUM SERPL-MCNC: 2.1 MG/DL — SIGNIFICANT CHANGE UP (ref 1.6–2.6)
MCHC RBC-ENTMCNC: 27.4 PG — SIGNIFICANT CHANGE UP (ref 27–34)
MCHC RBC-ENTMCNC: 27.4 PG — SIGNIFICANT CHANGE UP (ref 27–34)
MCHC RBC-ENTMCNC: 33.3 GM/DL — SIGNIFICANT CHANGE UP (ref 32–36)
MCHC RBC-ENTMCNC: 33.3 GM/DL — SIGNIFICANT CHANGE UP (ref 32–36)
MCV RBC AUTO: 82.3 FL — SIGNIFICANT CHANGE UP (ref 80–100)
MCV RBC AUTO: 82.3 FL — SIGNIFICANT CHANGE UP (ref 80–100)
METHOD TYPE: SIGNIFICANT CHANGE UP
METHOD TYPE: SIGNIFICANT CHANGE UP
NRBC # BLD: 0 /100 WBCS — SIGNIFICANT CHANGE UP (ref 0–0)
NRBC # BLD: 0 /100 WBCS — SIGNIFICANT CHANGE UP (ref 0–0)
NRBC # FLD: 0 K/UL — SIGNIFICANT CHANGE UP (ref 0–0)
NRBC # FLD: 0 K/UL — SIGNIFICANT CHANGE UP (ref 0–0)
ORGANISM # SPEC MICROSCOPIC CNT: ABNORMAL
ORGANISM # SPEC MICROSCOPIC CNT: ABNORMAL
ORGANISM # SPEC MICROSCOPIC CNT: SIGNIFICANT CHANGE UP
ORGANISM # SPEC MICROSCOPIC CNT: SIGNIFICANT CHANGE UP
PHOSPHATE SERPL-MCNC: 3.3 MG/DL — SIGNIFICANT CHANGE UP (ref 2.5–4.5)
PHOSPHATE SERPL-MCNC: 3.3 MG/DL — SIGNIFICANT CHANGE UP (ref 2.5–4.5)
PLATELET # BLD AUTO: 324 K/UL — SIGNIFICANT CHANGE UP (ref 150–400)
PLATELET # BLD AUTO: 324 K/UL — SIGNIFICANT CHANGE UP (ref 150–400)
POTASSIUM SERPL-MCNC: 3.5 MMOL/L — SIGNIFICANT CHANGE UP (ref 3.5–5.3)
POTASSIUM SERPL-MCNC: 3.5 MMOL/L — SIGNIFICANT CHANGE UP (ref 3.5–5.3)
POTASSIUM SERPL-SCNC: 3.5 MMOL/L — SIGNIFICANT CHANGE UP (ref 3.5–5.3)
POTASSIUM SERPL-SCNC: 3.5 MMOL/L — SIGNIFICANT CHANGE UP (ref 3.5–5.3)
PROT SERPL-MCNC: 6.3 G/DL — SIGNIFICANT CHANGE UP (ref 6–8.3)
PROT SERPL-MCNC: 6.3 G/DL — SIGNIFICANT CHANGE UP (ref 6–8.3)
RBC # BLD: 4.34 M/UL — SIGNIFICANT CHANGE UP (ref 3.8–5.2)
RBC # BLD: 4.34 M/UL — SIGNIFICANT CHANGE UP (ref 3.8–5.2)
RBC # FLD: 15.9 % — HIGH (ref 10.3–14.5)
RBC # FLD: 15.9 % — HIGH (ref 10.3–14.5)
SODIUM SERPL-SCNC: 141 MMOL/L — SIGNIFICANT CHANGE UP (ref 135–145)
SODIUM SERPL-SCNC: 141 MMOL/L — SIGNIFICANT CHANGE UP (ref 135–145)
SPECIMEN SOURCE: SIGNIFICANT CHANGE UP
SPECIMEN SOURCE: SIGNIFICANT CHANGE UP
WBC # BLD: 5.53 K/UL — SIGNIFICANT CHANGE UP (ref 3.8–10.5)
WBC # BLD: 5.53 K/UL — SIGNIFICANT CHANGE UP (ref 3.8–10.5)
WBC # FLD AUTO: 5.53 K/UL — SIGNIFICANT CHANGE UP (ref 3.8–10.5)
WBC # FLD AUTO: 5.53 K/UL — SIGNIFICANT CHANGE UP (ref 3.8–10.5)

## 2024-01-03 PROCEDURE — 99232 SBSQ HOSP IP/OBS MODERATE 35: CPT

## 2024-01-03 RX ORDER — MOMETASONE FUROATE 220 UG/1
2 INHALANT RESPIRATORY (INHALATION) DAILY
Refills: 0 | Status: DISCONTINUED | OUTPATIENT
Start: 2024-01-03 | End: 2024-01-06

## 2024-01-03 RX ADMIN — PANTOPRAZOLE SODIUM 40 MILLIGRAM(S): 20 TABLET, DELAYED RELEASE ORAL at 05:49

## 2024-01-03 RX ADMIN — LOSARTAN POTASSIUM 25 MILLIGRAM(S): 100 TABLET, FILM COATED ORAL at 05:50

## 2024-01-03 RX ADMIN — ENOXAPARIN SODIUM 40 MILLIGRAM(S): 100 INJECTION SUBCUTANEOUS at 18:26

## 2024-01-03 RX ADMIN — ATORVASTATIN CALCIUM 40 MILLIGRAM(S): 80 TABLET, FILM COATED ORAL at 21:40

## 2024-01-03 RX ADMIN — AMLODIPINE BESYLATE 10 MILLIGRAM(S): 2.5 TABLET ORAL at 05:49

## 2024-01-03 NOTE — PROGRESS NOTE ADULT - SUBJECTIVE AND OBJECTIVE BOX
[facility] Division of Hospital Medicine  Lynn Negron MD  Pager [phone]    Patient is a 78y old  Female who presents with a chief complaint of headache, agitation       SUBJECTIVE / OVERNIGHT EVENTS: pt seen earlier today; denies HA, denies LE pain; states she didn;t eat breakfast because it was too salty for her      MEDICATIONS  (STANDING):  amLODIPine   Tablet 10 milliGRAM(s) Oral daily  atorvastatin 40 milliGRAM(s) Oral at bedtime  enoxaparin Injectable 40 milliGRAM(s) SubCutaneous every 24 hours  losartan 25 milliGRAM(s) Oral daily  mometasone 220 MICROgram(s) Inhaler 1 Puff(s) Inhalation daily  pantoprazole    Tablet 40 milliGRAM(s) Oral before breakfast    MEDICATIONS  (PRN):  acetaminophen     Tablet .. 650 milliGRAM(s) Oral every 6 hours PRN Temp greater or equal to 38C (100.4F), Mild Pain (1 - 3)  albuterol    90 MICROgram(s) HFA Inhaler 2 Puff(s) Inhalation every 6 hours PRN for shortness of breath and/or wheezing  benzonatate 100 milliGRAM(s) Oral three times a day PRN for cough  melatonin 3 milliGRAM(s) Oral at bedtime PRN Insomnia      CAPILLARY BLOOD GLUCOSE      POCT Blood Glucose.: 122 mg/dL (03 Jan 2024 12:12)    I&O's Summary      PHYSICAL EXAM:  Vital Signs Last 24 Hrs  T(F): 98 (03 Jan 2024 09:40), Max: 98.5 (02 Jan 2024 23:27)  HR: 92 (03 Jan 2024 09:40) (88 - 95)  BP: 133/61 (03 Jan 2024 09:40) (133/61 - 156/58)  RR: 18 (03 Jan 2024 09:40) (18 - 19)  SpO2: 95% (03 Jan 2024 09:40) (95% - 98%)    Parameters below as of 03 Jan 2024 09:40  Patient On (Oxygen Delivery Method): room air        CONSTITUTIONAL: NAD, appears comfortable  EYES: PERRLA; conjunctiva and sclera clear  ENMT: Moist oral mucosa; normal dentition  RESPIRATORY: Normal respiratory effort; lungs are clear to auscultation bilaterally  CARDIOVASCULAR: Regular rate and rhythm; No lower extremity edema;  ABDOMEN: Nontender to palpation, normoactive bowel sounds  MUSCULOSKELETAL:  no clubbing or cyanosis of digits; no joint swelling or tenderness to palpation  PSYCH: calm, coop; affect appropriate  NEUROLOGY: CN 2-12 are intact and symmetric; no gross sensory deficits   SKIN: No rashes; no palpable lesions    LABS:                        11.9   5.53  )-----------( 324      ( 03 Jan 2024 05:30 )             35.7     01-03    141  |  102  |  16  ----------------------------<  111<H>  3.5   |  25  |  1.11    Ca    9.0      03 Jan 2024 05:30  Phos  3.3     01-03  Mg     2.10     01-03    TPro  6.3  /  Alb  3.6  /  TBili  0.5  /  DBili  x   /  AST  14  /  ALT  17  /  AlkPhos  149<H>  01-03       [facility] Division of Hospital Medicine  Lynn Negron MD  Pager [phone]    Patient is a 78y old  Female who presents with a chief complaint of headache, agitation       SUBJECTIVE / OVERNIGHT EVENTS: pt seen earlier today; denies HA, denies LE pain; states she didn;t eat breakfast because it was too salty for her      MEDICATIONS  (STANDING):  amLODIPine   Tablet 10 milliGRAM(s) Oral daily  atorvastatin 40 milliGRAM(s) Oral at bedtime  enoxaparin Injectable 40 milliGRAM(s) SubCutaneous every 24 hours  losartan 25 milliGRAM(s) Oral daily  mometasone 220 MICROgram(s) Inhaler 1 Puff(s) Inhalation daily  pantoprazole    Tablet 40 milliGRAM(s) Oral before breakfast    MEDICATIONS  (PRN):  acetaminophen     Tablet .. 650 milliGRAM(s) Oral every 6 hours PRN Temp greater or equal to 38C (100.4F), Mild Pain (1 - 3)  albuterol    90 MICROgram(s) HFA Inhaler 2 Puff(s) Inhalation every 6 hours PRN for shortness of breath and/or wheezing  benzonatate 100 milliGRAM(s) Oral three times a day PRN for cough  melatonin 3 milliGRAM(s) Oral at bedtime PRN Insomnia      CAPILLARY BLOOD GLUCOSE      POCT Blood Glucose.: 122 mg/dL (03 Jan 2024 12:12)    I&O's Summary      PHYSICAL EXAM:  Vital Signs Last 24 Hrs  T(F): 98 (03 Jan 2024 09:40), Max: 98.5 (02 Jan 2024 23:27)  HR: 92 (03 Jan 2024 09:40) (88 - 95)  BP: 133/61 (03 Jan 2024 09:40) (133/61 - 156/58)  RR: 18 (03 Jan 2024 09:40) (18 - 19)  SpO2: 95% (03 Jan 2024 09:40) (95% - 98%)    Parameters below as of 03 Jan 2024 09:40  Patient On (Oxygen Delivery Method): room air        CONSTITUTIONAL: NAD, appears comfortable  EYES: PERRLA; conjunctiva and sclera clear  ENMT: Moist oral mucosa; normal dentition  RESPIRATORY: Normal respiratory effort; lungs are clear to auscultation bilaterally  CARDIOVASCULAR: Regular rate and rhythm; No lower extremity edema;  ABDOMEN: Nontender to palpation, normoactive bowel sounds  MUSCULOSKELETAL:  no clubbing or cyanosis of digits; no joint swelling or tenderness to palpation  PSYCH: calm, coop; affect appropriate  NEUROLOGY: CN 2-12 are intact and symmetric; no gross sensory deficits   SKIN: No rashes; no palpable lesions    LABS:                        11.9   5.53  )-----------( 324      ( 03 Jan 2024 05:30 )             35.7     01-03    141  |  102  |  16  ----------------------------<  111<H>  3.5   |  25  |  1.11    Ca    9.0      03 Jan 2024 05:30  Phos  3.3     01-03  Mg     2.10     01-03    TPro  6.3  /  Alb  3.6  /  TBili  0.5  /  DBili  x   /  AST  14  /  ALT  17  /  AlkPhos  149<H>  01-03

## 2024-01-03 NOTE — PHYSICAL THERAPY INITIAL EVALUATION ADULT - SIT-TO-STAND BALANCE
Patient has surgery scheduled on 9/2/21 at  OR with Dr Barragan. The patient's COVID lab appt and Postop appt had been cancelled via patient portal. Contacted the patient to discuss rescheduling these or if she is wanting to cancel the surgery as well. Had to leave a message. Asked her to call back to discuss   poor balance

## 2024-01-03 NOTE — PROGRESS NOTE ADULT - PROBLEM SELECTOR PLAN 9
CT c-spine at [place] with 1.3cm soft tissue nodule in L parotid  - daughter made aware  - f/u o/p ENT CT c-spine at [place] with 1.3cm soft tissue nodule in L parotid  - daughter made aware  - f/u o/p ENT

## 2024-01-03 NOTE — PHYSICAL THERAPY INITIAL EVALUATION ADULT - PERTINENT HX OF CURRENT PROBLEM, REHAB EVAL
78 year old Female now presenting to [facility] for severe headache and agitation x1 day, now resolved while in the ED 78 year old Female now presenting to [facility] for severe headache and agitation x1 day, now resolved while in the ED

## 2024-01-03 NOTE — PROGRESS NOTE ADULT - PROBLEM SELECTOR PLAN 10
- incidental L axillary lymph node enlargement on CT at [place]  - daughter made aware  - f/u PCP o/p - incidental L axillary lymph node enlargement on CT at [place]  - daughter made aware  - f/u PCP o/p

## 2024-01-03 NOTE — PHYSICAL THERAPY INITIAL EVALUATION ADULT - ADDITIONAL COMMENTS
Patient lives with  in private home, 5 steps to enter and no steps to bedroom/bathroom. Patient was getting assist with ADL prior to admission. Patient was using a walker and cane prior to admission.

## 2024-01-03 NOTE — PROGRESS NOTE ADULT - PROBLEM SELECTOR PLAN 1
Had a mechanical fall about 1 week prior to admission while getting out of bed and injured her LLE. Since then with multiple mechanical falls, most recently 12/31 with headstrike w/o LOC. 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 Aneurysm known to patient and daughter and was diagnosed 15 years ago and has been being monitored with periodic CT scans but admit that they may not have been following up as much recently. Pt was discharged after refusing to work with PT. The next AM, pt awoke with severe frontal HA with photophobia associated with disorientation and agitation. No new focal weakness, nausea, vomiting and was able to tolerate PO. Patient now back at baseline mental status when interviewed in the ED and HA resolved.  - CTH: 2.2 x 1.5 x 2cm meningioma  - neuro exam at baseline (mild LLE weakness compared to RLE)  Plan:  - likely post concussive HA with delirium brought on by pain. less likely to be related to aneurysm given CTA head that showed aneurysm was after the headstrike. discussed with neurosurgery, report that since it is a known aneurysm and no hemorrhage on scan with baseline neuro exam, unlikely to be 2/2 hemorrhage or aneurysm rupture. recommend o/p f/u for aneurysm monitoring  - f/u PT  - q4 neuro checks. stat head CT for any change in status. Had a mechanical fall about 1 week prior to admission while getting out of bed and injured her LLE. Since then with multiple mechanical falls, most recently 12/31 with headstrike w/o LOC. 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 Aneurysm known to patient and daughter and was diagnosed 15 years ago and has been being monitored with periodic CT scans but admit that they may not have been following up as much recently. Pt was discharged after refusing to work with PT. The next AM, pt awoke with severe frontal HA with photophobia associated with disorientation and agitation. No new focal weakness, nausea, vomiting and was able to tolerate PO. Patient now back at baseline mental status when interviewed in the ED and HA resolved.  - CTH: 2.2 x 1.5 x 2cm meningioma  - neuro exam at baseline (mild LLE weakness compared to RLE)  Plan:  - likely post concussive HA with delirium brought on by pain. less likely to be related to aneurysm given CTA head that showed aneurysm was after the headstrike. discussed with neurosurgery, report that since it is a known aneurysm and no hemorrhage on scan with baseline neuro exam, unlikely to be 2/2 hemorrhage or aneurysm rupture. recommend o/p f/u for aneurysm monitoring  - f/u PT  - q4 neuro checks. stat head CT for any change in status.

## 2024-01-03 NOTE — PROGRESS NOTE ADULT - ASSESSMENT
78F PMH HTN, CVA (2012; residual LLE weakness, uses walker), brain aneurysm (diagnosed >15 years ago, no surgery) with recent presentation to [facility] with multiple falls now presenting to [facility] for severe headache and agitation x1 day, now resolved while in the ED. 78F PMH HTN, CVA (2012; residual LLE weakness, uses walker), brain aneurysm (diagnosed >15 years ago, no surgery) with recent presentation to [facility] with multiple falls now presenting to [facility] for severe headache and agitation x1 day, now resolved while in the ED.

## 2024-01-04 LAB
ALBUMIN SERPL ELPH-MCNC: 3.3 G/DL — SIGNIFICANT CHANGE UP (ref 3.3–5)
ALBUMIN SERPL ELPH-MCNC: 3.3 G/DL — SIGNIFICANT CHANGE UP (ref 3.3–5)
ALP SERPL-CCNC: 148 U/L — HIGH (ref 40–120)
ALP SERPL-CCNC: 148 U/L — HIGH (ref 40–120)
ALT FLD-CCNC: 18 U/L — SIGNIFICANT CHANGE UP (ref 4–33)
ALT FLD-CCNC: 18 U/L — SIGNIFICANT CHANGE UP (ref 4–33)
ANION GAP SERPL CALC-SCNC: 11 MMOL/L — SIGNIFICANT CHANGE UP (ref 7–14)
ANION GAP SERPL CALC-SCNC: 11 MMOL/L — SIGNIFICANT CHANGE UP (ref 7–14)
AST SERPL-CCNC: 13 U/L — SIGNIFICANT CHANGE UP (ref 4–32)
AST SERPL-CCNC: 13 U/L — SIGNIFICANT CHANGE UP (ref 4–32)
BILIRUB SERPL-MCNC: 0.4 MG/DL — SIGNIFICANT CHANGE UP (ref 0.2–1.2)
BILIRUB SERPL-MCNC: 0.4 MG/DL — SIGNIFICANT CHANGE UP (ref 0.2–1.2)
BUN SERPL-MCNC: 13 MG/DL — SIGNIFICANT CHANGE UP (ref 7–23)
BUN SERPL-MCNC: 13 MG/DL — SIGNIFICANT CHANGE UP (ref 7–23)
CALCIUM SERPL-MCNC: 8.6 MG/DL — SIGNIFICANT CHANGE UP (ref 8.4–10.5)
CALCIUM SERPL-MCNC: 8.6 MG/DL — SIGNIFICANT CHANGE UP (ref 8.4–10.5)
CHLORIDE SERPL-SCNC: 104 MMOL/L — SIGNIFICANT CHANGE UP (ref 98–107)
CHLORIDE SERPL-SCNC: 104 MMOL/L — SIGNIFICANT CHANGE UP (ref 98–107)
CO2 SERPL-SCNC: 25 MMOL/L — SIGNIFICANT CHANGE UP (ref 22–31)
CO2 SERPL-SCNC: 25 MMOL/L — SIGNIFICANT CHANGE UP (ref 22–31)
CREAT SERPL-MCNC: 0.93 MG/DL — SIGNIFICANT CHANGE UP (ref 0.5–1.3)
CREAT SERPL-MCNC: 0.93 MG/DL — SIGNIFICANT CHANGE UP (ref 0.5–1.3)
EGFR: 63 ML/MIN/1.73M2 — SIGNIFICANT CHANGE UP
EGFR: 63 ML/MIN/1.73M2 — SIGNIFICANT CHANGE UP
GLUCOSE SERPL-MCNC: 119 MG/DL — HIGH (ref 70–99)
GLUCOSE SERPL-MCNC: 119 MG/DL — HIGH (ref 70–99)
HCT VFR BLD CALC: 37 % — SIGNIFICANT CHANGE UP (ref 34.5–45)
HCT VFR BLD CALC: 37 % — SIGNIFICANT CHANGE UP (ref 34.5–45)
HGB BLD-MCNC: 11.8 G/DL — SIGNIFICANT CHANGE UP (ref 11.5–15.5)
HGB BLD-MCNC: 11.8 G/DL — SIGNIFICANT CHANGE UP (ref 11.5–15.5)
MAGNESIUM SERPL-MCNC: 2.1 MG/DL — SIGNIFICANT CHANGE UP (ref 1.6–2.6)
MAGNESIUM SERPL-MCNC: 2.1 MG/DL — SIGNIFICANT CHANGE UP (ref 1.6–2.6)
MCHC RBC-ENTMCNC: 27.1 PG — SIGNIFICANT CHANGE UP (ref 27–34)
MCHC RBC-ENTMCNC: 27.1 PG — SIGNIFICANT CHANGE UP (ref 27–34)
MCHC RBC-ENTMCNC: 31.9 GM/DL — LOW (ref 32–36)
MCHC RBC-ENTMCNC: 31.9 GM/DL — LOW (ref 32–36)
MCV RBC AUTO: 84.9 FL — SIGNIFICANT CHANGE UP (ref 80–100)
MCV RBC AUTO: 84.9 FL — SIGNIFICANT CHANGE UP (ref 80–100)
NRBC # BLD: 0 /100 WBCS — SIGNIFICANT CHANGE UP (ref 0–0)
NRBC # BLD: 0 /100 WBCS — SIGNIFICANT CHANGE UP (ref 0–0)
NRBC # FLD: 0 K/UL — SIGNIFICANT CHANGE UP (ref 0–0)
NRBC # FLD: 0 K/UL — SIGNIFICANT CHANGE UP (ref 0–0)
PHOSPHATE SERPL-MCNC: 3.3 MG/DL — SIGNIFICANT CHANGE UP (ref 2.5–4.5)
PHOSPHATE SERPL-MCNC: 3.3 MG/DL — SIGNIFICANT CHANGE UP (ref 2.5–4.5)
PLATELET # BLD AUTO: 319 K/UL — SIGNIFICANT CHANGE UP (ref 150–400)
PLATELET # BLD AUTO: 319 K/UL — SIGNIFICANT CHANGE UP (ref 150–400)
POTASSIUM SERPL-MCNC: 3.7 MMOL/L — SIGNIFICANT CHANGE UP (ref 3.5–5.3)
POTASSIUM SERPL-MCNC: 3.7 MMOL/L — SIGNIFICANT CHANGE UP (ref 3.5–5.3)
POTASSIUM SERPL-SCNC: 3.7 MMOL/L — SIGNIFICANT CHANGE UP (ref 3.5–5.3)
POTASSIUM SERPL-SCNC: 3.7 MMOL/L — SIGNIFICANT CHANGE UP (ref 3.5–5.3)
PROT SERPL-MCNC: 6.3 G/DL — SIGNIFICANT CHANGE UP (ref 6–8.3)
PROT SERPL-MCNC: 6.3 G/DL — SIGNIFICANT CHANGE UP (ref 6–8.3)
RBC # BLD: 4.36 M/UL — SIGNIFICANT CHANGE UP (ref 3.8–5.2)
RBC # BLD: 4.36 M/UL — SIGNIFICANT CHANGE UP (ref 3.8–5.2)
RBC # FLD: 16.2 % — HIGH (ref 10.3–14.5)
RBC # FLD: 16.2 % — HIGH (ref 10.3–14.5)
SODIUM SERPL-SCNC: 140 MMOL/L — SIGNIFICANT CHANGE UP (ref 135–145)
SODIUM SERPL-SCNC: 140 MMOL/L — SIGNIFICANT CHANGE UP (ref 135–145)
WBC # BLD: 5.63 K/UL — SIGNIFICANT CHANGE UP (ref 3.8–10.5)
WBC # BLD: 5.63 K/UL — SIGNIFICANT CHANGE UP (ref 3.8–10.5)
WBC # FLD AUTO: 5.63 K/UL — SIGNIFICANT CHANGE UP (ref 3.8–10.5)
WBC # FLD AUTO: 5.63 K/UL — SIGNIFICANT CHANGE UP (ref 3.8–10.5)

## 2024-01-04 PROCEDURE — 99232 SBSQ HOSP IP/OBS MODERATE 35: CPT

## 2024-01-04 RX ORDER — POLYETHYLENE GLYCOL 3350 17 G/17G
17 POWDER, FOR SOLUTION ORAL ONCE
Refills: 0 | Status: COMPLETED | OUTPATIENT
Start: 2024-01-04 | End: 2024-01-04

## 2024-01-04 RX ORDER — SENNA PLUS 8.6 MG/1
2 TABLET ORAL ONCE
Refills: 0 | Status: COMPLETED | OUTPATIENT
Start: 2024-01-04 | End: 2024-01-04

## 2024-01-04 RX ADMIN — MOMETASONE FUROATE 2 PUFF(S): 220 INHALANT RESPIRATORY (INHALATION) at 12:44

## 2024-01-04 RX ADMIN — PANTOPRAZOLE SODIUM 40 MILLIGRAM(S): 20 TABLET, DELAYED RELEASE ORAL at 05:47

## 2024-01-04 RX ADMIN — ATORVASTATIN CALCIUM 40 MILLIGRAM(S): 80 TABLET, FILM COATED ORAL at 21:06

## 2024-01-04 RX ADMIN — ENOXAPARIN SODIUM 40 MILLIGRAM(S): 100 INJECTION SUBCUTANEOUS at 17:59

## 2024-01-04 RX ADMIN — POLYETHYLENE GLYCOL 3350 17 GRAM(S): 17 POWDER, FOR SOLUTION ORAL at 21:07

## 2024-01-04 RX ADMIN — SENNA PLUS 2 TABLET(S): 8.6 TABLET ORAL at 21:06

## 2024-01-04 RX ADMIN — AMLODIPINE BESYLATE 10 MILLIGRAM(S): 2.5 TABLET ORAL at 05:46

## 2024-01-04 RX ADMIN — LOSARTAN POTASSIUM 25 MILLIGRAM(S): 100 TABLET, FILM COATED ORAL at 05:46

## 2024-01-04 NOTE — PROGRESS NOTE ADULT - PROBLEM SELECTOR PLAN 1
Had a mechanical fall about 1 week prior to admission while getting out of bed and injured her LLE. Since then with multiple mechanical falls, most recently 12/31 with headstrike w/o LOC. 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 Aneurysm known to patient and daughter and was diagnosed 15 years ago and has been being monitored with periodic CT scans but admit that they may not have been following up as much recently. Pt was discharged after refusing to work with PT. The next AM, pt awoke with severe frontal HA with photophobia associated with disorientation and agitation. No new focal weakness, nausea, vomiting and was able to tolerate PO. Patient now back at baseline mental status when interviewed in the ED and HA resolved.  - CTH: 2.2 x 1.5 x 2cm meningioma  - neuro exam at baseline (mild LLE weakness compared to RLE)  Plan:  - likely post concussive HA with delirium brought on by pain. less likely to be related to aneurysm given CTA head that showed aneurysm was after the headstrike. discussed with neurosurgery, report that since it is a known aneurysm and no hemorrhage on scan with baseline neuro exam, unlikely to be 2/2 hemorrhage or aneurysm rupture. recommend o/p f/u for aneurysm monitoring  - f/u PT  - q4 neuro checks. stat head CT for any change in status. Had a mechanical fall about 1 week prior to admission while getting out of bed and injured her LLE. Since then with multiple mechanical falls, most recently 12/31 with headstrike w/o LOC. 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 Aneurysm known to patient and daughter and was diagnosed 15 years ago and has been being monitored with periodic CT scans but admit that they may not have been following up as much recently. Pt was discharged after refusing to work with PT. The next AM, pt awoke with severe frontal HA with photophobia associated with disorientation and agitation. No new focal weakness, nausea, vomiting and was able to tolerate PO. Patient now back at baseline mental status when interviewed in the ED and HA resolved.  - CTH: 2.2 x 1.5 x 2cm meningioma  - neuro exam at baseline (mild LLE weakness compared to RLE)  Plan:  - likely post concussive HA with delirium brought on by pain. less likely to be related to aneurysm given CTA head that showed aneurysm was after the headstrike. discussed with neurosurgery, report that since it is a known aneurysm and no hemorrhage on scan with baseline neuro exam, unlikely to be 2/2 hemorrhage or aneurysm rupture. recommend o/p f/u for aneurysm monitoring  - f/u PT  - q4 neuro checks. stat head CT for any change in status.

## 2024-01-04 NOTE — PROGRESS NOTE ADULT - ASSESSMENT
78F PMH HTN, CVA (2012; residual LLE weakness, uses walker), brain aneurysm (diagnosed >15 years ago, no surgery) with recent presentation to [facility] with multiple falls now presenting to [facility] for severe headache and agitation x1 day, now resolved while in the ED. 78F PMH HTN, CVA (2012; residual LLE weakness, uses walker), brain aneurysm (diagnosed >15 years ago, no surgery) with recent presentation to [facility] with multiple falls now presenting to [facility] for severe headache and agitation x1 day, now resolved while in the ED.

## 2024-01-04 NOTE — PROGRESS NOTE ADULT - SUBJECTIVE AND OBJECTIVE BOX
[facility] Division of Hospital Medicine  Lynn Negron MD  Pager [phone]    Patient is a 78y old  Female who presents with a chief complaint of headache, agitation      SUBJECTIVE / OVERNIGHT EVENTS: pt without complaints; pt sleeping upon my arrival; responded to me greeting but preferred to sleep      MEDICATIONS  (STANDING):  amLODIPine   Tablet 10 milliGRAM(s) Oral daily  atorvastatin 40 milliGRAM(s) Oral at bedtime  enoxaparin Injectable 40 milliGRAM(s) SubCutaneous every 24 hours  losartan 25 milliGRAM(s) Oral daily  mometasone 220 MICROgram(s) Inhaler 2 Puff(s) Inhalation daily  pantoprazole    Tablet 40 milliGRAM(s) Oral before breakfast    MEDICATIONS  (PRN):  acetaminophen     Tablet .. 650 milliGRAM(s) Oral every 6 hours PRN Temp greater or equal to 38C (100.4F), Mild Pain (1 - 3)  albuterol    90 MICROgram(s) HFA Inhaler 2 Puff(s) Inhalation every 6 hours PRN for shortness of breath and/or wheezing  benzonatate 100 milliGRAM(s) Oral three times a day PRN for cough  melatonin 3 milliGRAM(s) Oral at bedtime PRN Insomnia      PHYSICAL EXAM:  Vital Signs Last 24 Hrs  T(F): 98.2 (04 Jan 2024 13:30), Max: 98.2 (04 Jan 2024 13:30)  HR: 71 (04 Jan 2024 13:30) (71 - 85)  BP: 141/64 (04 Jan 2024 13:30) (141/64 - 144/62)  RR: 18 (04 Jan 2024 13:30) (18 - 19)  SpO2: 100% (04 Jan 2024 13:30) (98% - 100%)    Parameters below as of 04 Jan 2024 13:30  Patient On (Oxygen Delivery Method): room air      exam limited due to pt preference to sleep  CONSTITUTIONAL: NAD, appears comforable  EYES: PERRLA; conjunctiva and sclera clear  ENMT: Moist oral mucosa; normal dentition  RESPIRATORY: Normal respiratory effort; lungs are clear to auscultation bilaterally  CARDIOVASCULAR: Regular rate and rhythm; No lower extremity edema;  ABDOMEN: Nontender to palpation, normoactive bowel sounds  MUSCULOSKELETAL:   no clubbing or cyanosis of digits; no joint swelling or tenderness to palpation  PSYCH: calm, coop; affect appropriate  NEUROLOGY: CN 2-12 are intact and symmetric; no gross sensory deficits   SKIN: No rashes; no palpable lesions    LABS:                        11.8   5.63  )-----------( 319      ( 04 Jan 2024 06:25 )             37.0     01-04    140  |  104  |  13  ----------------------------<  119<H>  3.7   |  25  |  0.93    Ca    8.6      04 Jan 2024 06:25  Phos  3.3     01-04  Mg     2.10     01-04    TPro  6.3  /  Alb  3.3  /  TBili  0.4  /  DBili  x   /  AST  13  /  ALT  18  /  AlkPhos  148<H>  01-04         [facility] Division of Hospital Medicine  Lynn Negron MD  Pager [phone]    Patient is a 78y old  Female who presents with a chief complaint of headache, agitation      SUBJECTIVE / OVERNIGHT EVENTS: pt without complaints; pt sleeping upon my arrival; responded to me greeting but preferred to sleep      MEDICATIONS  (STANDING):  amLODIPine   Tablet 10 milliGRAM(s) Oral daily  atorvastatin 40 milliGRAM(s) Oral at bedtime  enoxaparin Injectable 40 milliGRAM(s) SubCutaneous every 24 hours  losartan 25 milliGRAM(s) Oral daily  mometasone 220 MICROgram(s) Inhaler 2 Puff(s) Inhalation daily  pantoprazole    Tablet 40 milliGRAM(s) Oral before breakfast    MEDICATIONS  (PRN):  acetaminophen     Tablet .. 650 milliGRAM(s) Oral every 6 hours PRN Temp greater or equal to 38C (100.4F), Mild Pain (1 - 3)  albuterol    90 MICROgram(s) HFA Inhaler 2 Puff(s) Inhalation every 6 hours PRN for shortness of breath and/or wheezing  benzonatate 100 milliGRAM(s) Oral three times a day PRN for cough  melatonin 3 milliGRAM(s) Oral at bedtime PRN Insomnia      PHYSICAL EXAM:  Vital Signs Last 24 Hrs  T(F): 98.2 (04 Jan 2024 13:30), Max: 98.2 (04 Jan 2024 13:30)  HR: 71 (04 Jan 2024 13:30) (71 - 85)  BP: 141/64 (04 Jan 2024 13:30) (141/64 - 144/62)  RR: 18 (04 Jan 2024 13:30) (18 - 19)  SpO2: 100% (04 Jan 2024 13:30) (98% - 100%)    Parameters below as of 04 Jan 2024 13:30  Patient On (Oxygen Delivery Method): room air      exam limited due to pt preference to sleep  CONSTITUTIONAL: NAD, appears comforable  EYES: PERRLA; conjunctiva and sclera clear  ENMT: Moist oral mucosa; normal dentition  RESPIRATORY: Normal respiratory effort; lungs are clear to auscultation bilaterally  CARDIOVASCULAR: Regular rate and rhythm; No lower extremity edema;  ABDOMEN: Nontender to palpation, normoactive bowel sounds  MUSCULOSKELETAL:   no clubbing or cyanosis of digits; no joint swelling or tenderness to palpation  PSYCH: calm, coop; affect appropriate  NEUROLOGY: CN 2-12 are intact and symmetric; no gross sensory deficits   SKIN: No rashes; no palpable lesions    LABS:                        11.8   5.63  )-----------( 319      ( 04 Jan 2024 06:25 )             37.0     01-04    140  |  104  |  13  ----------------------------<  119<H>  3.7   |  25  |  0.93    Ca    8.6      04 Jan 2024 06:25  Phos  3.3     01-04  Mg     2.10     01-04    TPro  6.3  /  Alb  3.3  /  TBili  0.4  /  DBili  x   /  AST  13  /  ALT  18  /  AlkPhos  148<H>  01-04

## 2024-01-04 NOTE — PROGRESS NOTE ADULT - PROBLEM SELECTOR PLAN 9
CT c-spine at [place] with 1.3cm soft tissue nodule in L parotid  - daughter made aware  - f/u o/p ENT CT c-spine at [place] with 1.3cm soft tissue nodule in L parotid  - daughter made aware  - f/u o/p ENT

## 2024-01-04 NOTE — PROGRESS NOTE ADULT - PROBLEM SELECTOR PLAN 10
- incidental L axillary lymph node enlargement on CT at [place]  - daughter made aware  - f/u PCP o/p - incidental L axillary lymph node enlargement on CT at [place]  - daughter made aware  - f/u PCP o/p

## 2024-01-05 ENCOUNTER — TRANSCRIPTION ENCOUNTER (OUTPATIENT)
Age: 79
End: 2024-01-05

## 2024-01-05 PROCEDURE — 99232 SBSQ HOSP IP/OBS MODERATE 35: CPT

## 2024-01-05 RX ADMIN — MOMETASONE FUROATE 2 PUFF(S): 220 INHALANT RESPIRATORY (INHALATION) at 11:07

## 2024-01-05 RX ADMIN — LOSARTAN POTASSIUM 25 MILLIGRAM(S): 100 TABLET, FILM COATED ORAL at 06:08

## 2024-01-05 RX ADMIN — ENOXAPARIN SODIUM 40 MILLIGRAM(S): 100 INJECTION SUBCUTANEOUS at 17:14

## 2024-01-05 RX ADMIN — AMLODIPINE BESYLATE 10 MILLIGRAM(S): 2.5 TABLET ORAL at 06:08

## 2024-01-05 RX ADMIN — PANTOPRAZOLE SODIUM 40 MILLIGRAM(S): 20 TABLET, DELAYED RELEASE ORAL at 06:09

## 2024-01-05 RX ADMIN — ATORVASTATIN CALCIUM 40 MILLIGRAM(S): 80 TABLET, FILM COATED ORAL at 22:04

## 2024-01-05 NOTE — DISCHARGE NOTE PROVIDER - NSDCMRMEDTOKEN_GEN_ALL_CORE_FT
Albuterol (Eqv-ProAir HFA) 90 mcg/inh inhalation aerosol: 2 puff(s) inhaled 4 times a day as needed for  shortness of breath and/or wheezing  amLODIPine 10 mg oral tablet: 1 tab(s) orally once a day  benzonatate 100 mg oral capsule: 1 cap(s) orally 3 times a day as needed for  cough  fluticasone 100 mcg/inh inhalation powder: 1 puff(s) inhaled once a day  Lipitor 40 mg oral tablet: 1 tab(s) orally once a day  losartan 25 mg oral tablet: 1 tab(s) orally once a day  omeprazole 40 mg oral delayed release capsule: 1 cap(s) orally once a day   acetaminophen 325 mg oral tablet: 2 tab(s) orally every 6 hours As needed Temp greater or equal to 38C (100.4F), Mild Pain (1 - 3)  Albuterol (Eqv-ProAir HFA) 90 mcg/inh inhalation aerosol: 2 puff(s) inhaled 4 times a day as needed for  shortness of breath and/or wheezing  amLODIPine 10 mg oral tablet: 1 tab(s) orally once a day  benzonatate 100 mg oral capsule: 1 cap(s) orally 3 times a day as needed for  cough  fluticasone 100 mcg/inh inhalation powder: 1 puff(s) inhaled once a day  Lipitor 40 mg oral tablet: 1 tab(s) orally once a day  losartan 25 mg oral tablet: 1 tab(s) orally once a day  omeprazole 40 mg oral delayed release capsule: 1 cap(s) orally once a day

## 2024-01-05 NOTE — DISCHARGE NOTE NURSING/CASE MANAGEMENT/SOCIAL WORK - NSDCPEFALRISK_GEN_ALL_CORE
For information on Fall & Injury Prevention, visit: [email or web address] OR  [email or web address] OR  https://www.cdc.gov/steadi/patient.html For information on Fall & Injury Prevention, visit: [email or web address] OR  [email or web address] OR  https://www.cdc.gov/steadi/patient.html

## 2024-01-05 NOTE — DISCHARGE NOTE PROVIDER - NSDCCPCAREPLAN_GEN_ALL_CORE_FT
PRINCIPAL DISCHARGE DIAGNOSIS  Diagnosis: Acute headache  Assessment and Plan of Treatment: Your headache is likely as a result of a consussion due to your recent fall.  THis has improved      SECONDARY DISCHARGE DIAGNOSES  Diagnosis: HTN (hypertension)  Assessment and Plan of Treatment: COntinue your blood pressure medication    Diagnosis: Frequent falls  Assessment and Plan of Treatment: PT recommended rehab; You and your family preferred for home and is in the process of making arrangements for help at home    Diagnosis: Brain aneurysm  Assessment and Plan of Treatment: Follow up with your primary care doctor for routine monitoring     PRINCIPAL DISCHARGE DIAGNOSIS  Diagnosis: Acute headache  Assessment and Plan of Treatment: Your headache is likely as a result of a consussion due to your recent fall.  THis has improved      SECONDARY DISCHARGE DIAGNOSES  Diagnosis: HTN (hypertension)  Assessment and Plan of Treatment: COntinue your blood pressure medication    Diagnosis: Frequent falls  Assessment and Plan of Treatment: PT recommended rehab; You and your family preferred for home and is in the process of making arrangements for help at home    Diagnosis: Brain aneurysm  Assessment and Plan of Treatment: Follow up with your primary care doctor for routine monitoring    Diagnosis: Lesion of parotid gland  Assessment and Plan of Treatment: You were incidentally noted to have a small lesion on LEFT parotid as well as a LEFT prominent lymph node.  FOllow up with your primary care docotr for further evaluation

## 2024-01-05 NOTE — DISCHARGE NOTE NURSING/CASE MANAGEMENT/SOCIAL WORK - PATIENT PORTAL LINK FT
You can access the FollowMyHealth Patient Portal offered by [facility] by registering at the following website: [email or web address]. By joining [employer]s FollowMyHealth portal, you will also be able to view your health information using other applications (apps) compatible with our system. You can access the FollowMyHealth Patient Portal offered by [facility] by registering at the following website: [email or web address]. By joining [employer] [facility]’s FollowMyHealth portal, you will also be able to view your health information using other applications (apps) compatible with our system.

## 2024-01-05 NOTE — DISCHARGE NOTE PROVIDER - CARE PROVIDER_API CALL
Morris Puri  Internal Medicine  [address], [address]  [place] [address]  Phone: [phone]  Fax: [phone]  Follow Up Time: 2 weeks   Morris Puri  Internal Medicine  [address], [address]  [place] [address]  Phone: [phone]  Fax: [phone]  Follow Up Time: 2 weeks   Morris Puri  Internal Medicine  [address], [address]  [place] [address]  Phone: [phone]  Fax: [phone]  Follow Up Time: 2 weeks    Tamir Santoro  Neurosurgery  [address]  [place] [address]  Phone: [phone]  Fax: [phone]  Follow Up Time: 2 weeks   Morris Puri  Internal Medicine  [address], [address]  [place] [address]  Phone: [phone]  Fax: [phone]  Follow Up Time: 2 weeks    Tamir Santoro  Neurosurgery  [address]  [place] [address]  Phone: [phone]  Fax: [phone]  Follow Up Time: 2 weeks

## 2024-01-05 NOTE — PROGRESS NOTE ADULT - ASSESSMENT
78F PMH HTN, CVA (2012; residual LLE weakness, uses walker), brain aneurysm (diagnosed >15 years ago, no surgery) with recent presentation to [facility] with multiple falls now presenting to [facility] for severe headache and agitation x1 day, now resolved while in the ED. 78F PMH HTN, CVA (2012; residual LLE weakness, uses walker), brain aneurysm (diagnosed >15 years ago, no surgery) with recent presentation to [facility] with multiple falls now presenting to [facility] for severe headache and agitation x1 day, now resolved while in the ED.

## 2024-01-05 NOTE — DISCHARGE NOTE PROVIDER - HOSPITAL COURSE
Pt is a 77 yo female with ho HTN, CVA p/w headache and weakness  was seen approx 1 week ago after a fall at [facility], eval there and dc home as family did not want admission   returns here for intermittent persistent HA  CT at [facility] and here reveals a chronic know meningioma, stable; seen by NS, no intervention  PT rec rehab, family wishes for home  family arranging HHA for safe dispo Pt is a 79 yo female with ho HTN, CVA p/w headache and weakness  was seen approx 1 week ago after a fall at [facility], eval there and dc home as family did not want admission   returns here for intermittent persistent HA  CT at [facility] and here reveals a chronic know meningioma, stable; seen by NS, no intervention  PT rec rehab, family wishes for home  family arranging HHA for safe dispo

## 2024-01-05 NOTE — DISCHARGE NOTE PROVIDER - PROVIDER TOKENS
PROVIDER:[TOKEN:[[id number]],FOLLOWUP:[2 weeks]] PROVIDER:[TOKEN:[[id number]],FOLLOWUP:[2 weeks]],PROVIDER:[TOKEN:[[id number]],FOLLOWUP:[2 weeks]] PROVIDER:[TOKEN:[[id number]],FOLLOWUP:[2 weeks]],PROVIDER:[TOKEN:[[id number]],FOLLOWUP:[2 weeks]]

## 2024-01-05 NOTE — DISCHARGE NOTE PROVIDER - CARE PROVIDERS DIRECT ADDRESSES
,[email or web address],DirectAddress_Unknown ,[email or web address],DirectAddress_Unknown

## 2024-01-05 NOTE — PROGRESS NOTE ADULT - PROBLEM SELECTOR PLAN 9
CT c-spine at [place] with 1.3cm soft tissue nodule in L parotid  - daughter made aware  - f/u o/p ENT CT c-spine at [place] with 1.3cm soft tissue nodule in L parotid  - daughter made aware  - f/u o/p ENT

## 2024-01-05 NOTE — PROGRESS NOTE ADULT - SUBJECTIVE AND OBJECTIVE BOX
[facility] Division of Hospital Medicine  Lynn Negron MD  Pager [phone]    Patient is a 78y old  Female who presents with a chief complaint of headache, agitation       SUBJECTIVE / OVERNIGHT EVENTS: feeling better; states daughter is making arrangement for HHA; pt thinks this will start tomorrow; will need to verify      MEDICATIONS  (STANDING):  amLODIPine   Tablet 10 milliGRAM(s) Oral daily  atorvastatin 40 milliGRAM(s) Oral at bedtime  enoxaparin Injectable 40 milliGRAM(s) SubCutaneous every 24 hours  losartan 25 milliGRAM(s) Oral daily  mometasone 220 MICROgram(s) Inhaler 2 Puff(s) Inhalation daily  pantoprazole    Tablet 40 milliGRAM(s) Oral before breakfast    MEDICATIONS  (PRN):  acetaminophen     Tablet .. 650 milliGRAM(s) Oral every 6 hours PRN Temp greater or equal to 38C (100.4F), Mild Pain (1 - 3)  albuterol    90 MICROgram(s) HFA Inhaler 2 Puff(s) Inhalation every 6 hours PRN for shortness of breath and/or wheezing  benzonatate 100 milliGRAM(s) Oral three times a day PRN for cough  melatonin 3 milliGRAM(s) Oral at bedtime PRN Insomnia      PHYSICAL EXAM:  Vital Signs Last 24 Hrs  T(F): 97.4 (05 Jan 2024 05:16), Max: 98.2 (04 Jan 2024 13:30)  HR: 82 (05 Jan 2024 05:16) (71 - 82)  BP: 145/72 (05 Jan 2024 05:16) (141/64 - 145/72)  RR: 18 (05 Jan 2024 05:16) (18 - 18)  SpO2: 100% (05 Jan 2024 05:16) (100% - 100%)    Parameters below as of 05 Jan 2024 05:16  Patient On (Oxygen Delivery Method): room air        CONSTITUTIONAL: NAD, appears comfortable  EYES: PERRLA; conjunctiva and sclera clear  ENMT: Moist oral mucosa; normal dentition  RESPIRATORY: Normal respiratory effort; grossly b/l AE  CARDIOVASCULAR: Regular rate and rhythm; No lower extremity edema;  ABDOMEN: Nontender to palpation, normoactive bowel sounds  MUSCULOSKELETAL:  no clubbing or cyanosis of digits; no joint swelling or tenderness to palpation  PSYCH: calm, coop; affect appropriate  NEUROLOGY: CN 2-12 are intact and symmetric; no gross sensory deficits   SKIN: No rashes; no palpable lesions    LABS:                        11.8   5.63  )-----------( 319      ( 04 Jan 2024 06:25 )             37.0     01-04    140  |  104  |  13  ----------------------------<  119<H>  3.7   |  25  |  0.93    Ca    8.6      04 Jan 2024 06:25  Phos  3.3     01-04  Mg     2.10     01-04    TPro  6.3  /  Alb  3.3  /  TBili  0.4  /  DBili  x   /  AST  13  /  ALT  18  /  AlkPhos  148<H>  01-04           [facility] Division of Hospital Medicine  Lynn Negron MD  Pager [phone]    Patient is a 78y old  Female who presents with a chief complaint of headache, agitation       SUBJECTIVE / OVERNIGHT EVENTS: feeling better; states daughter is making arrangement for HHA; pt thinks this will start tomorrow; will need to verify      MEDICATIONS  (STANDING):  amLODIPine   Tablet 10 milliGRAM(s) Oral daily  atorvastatin 40 milliGRAM(s) Oral at bedtime  enoxaparin Injectable 40 milliGRAM(s) SubCutaneous every 24 hours  losartan 25 milliGRAM(s) Oral daily  mometasone 220 MICROgram(s) Inhaler 2 Puff(s) Inhalation daily  pantoprazole    Tablet 40 milliGRAM(s) Oral before breakfast    MEDICATIONS  (PRN):  acetaminophen     Tablet .. 650 milliGRAM(s) Oral every 6 hours PRN Temp greater or equal to 38C (100.4F), Mild Pain (1 - 3)  albuterol    90 MICROgram(s) HFA Inhaler 2 Puff(s) Inhalation every 6 hours PRN for shortness of breath and/or wheezing  benzonatate 100 milliGRAM(s) Oral three times a day PRN for cough  melatonin 3 milliGRAM(s) Oral at bedtime PRN Insomnia      PHYSICAL EXAM:  Vital Signs Last 24 Hrs  T(F): 97.4 (05 Jan 2024 05:16), Max: 98.2 (04 Jan 2024 13:30)  HR: 82 (05 Jan 2024 05:16) (71 - 82)  BP: 145/72 (05 Jan 2024 05:16) (141/64 - 145/72)  RR: 18 (05 Jan 2024 05:16) (18 - 18)  SpO2: 100% (05 Jan 2024 05:16) (100% - 100%)    Parameters below as of 05 Jan 2024 05:16  Patient On (Oxygen Delivery Method): room air        CONSTITUTIONAL: NAD, appears comfortable  EYES: PERRLA; conjunctiva and sclera clear  ENMT: Moist oral mucosa; normal dentition  RESPIRATORY: Normal respiratory effort; grossly b/l AE  CARDIOVASCULAR: Regular rate and rhythm; No lower extremity edema;  ABDOMEN: Nontender to palpation, normoactive bowel sounds  MUSCULOSKELETAL:  no clubbing or cyanosis of digits; no joint swelling or tenderness to palpation  PSYCH: calm, coop; affect appropriate  NEUROLOGY: CN 2-12 are intact and symmetric; no gross sensory deficits   SKIN: No rashes; no palpable lesions    LABS:                        11.8   5.63  )-----------( 319      ( 04 Jan 2024 06:25 )             37.0     01-04    140  |  104  |  13  ----------------------------<  119<H>  3.7   |  25  |  0.93    Ca    8.6      04 Jan 2024 06:25  Phos  3.3     01-04  Mg     2.10     01-04    TPro  6.3  /  Alb  3.3  /  TBili  0.4  /  DBili  x   /  AST  13  /  ALT  18  /  AlkPhos  148<H>  01-04

## 2024-01-05 NOTE — PROGRESS NOTE ADULT - PROBLEM SELECTOR PLAN 10
- incidental L axillary lymph node enlargement on CT at [place]  - daughter made aware  - f/u PCP o/p - incidental L axillary lymph node enlargement on CT at [place]  - daughter made aware  - f/u PCP o/p

## 2024-01-05 NOTE — PROGRESS NOTE ADULT - PROBLEM SELECTOR PLAN 1
Had a mechanical fall about 1 week prior to admission while getting out of bed and injured her LLE. Since then with multiple mechanical falls, most recently 12/31 with headstrike w/o LOC. 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 Aneurysm known to patient and daughter and was diagnosed 15 years ago and has been being monitored with periodic CT scans   Plan:  - likely post concussive HA with delirium brought on by pain. less likely to be related to aneurysm given CTA head that showed aneurysm was after the headstrike. discussed with neurosurgery, report that since it is a known aneurysm and no hemorrhage on scan with baseline neuro exam, unlikely to be 2/2 hemorrhage or aneurysm rupture. recommend o/p f/u for aneurysm monitoring  - f/u PT  - q4 neuro checks. stat head CT for any change in status. Had a mechanical fall about 1 week prior to admission while getting out of bed and injured her LLE. Since then with multiple mechanical falls, most recently 12/31 with headstrike w/o LOC. Presented to 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 Aneurysm known to patient and daughter and was diagnosed 15 years ago and has been being monitored with periodic CT scans   Plan:  - likely post concussive HA with delirium brought on by pain. less likely to be related to aneurysm given CTA head that showed aneurysm was after the headstrike. discussed with neurosurgery, report that since it is a known aneurysm and no hemorrhage on scan with baseline neuro exam, unlikely to be 2/2 hemorrhage or aneurysm rupture. recommend o/p f/u for aneurysm monitoring  - f/u PT  - q4 neuro checks. stat head CT for any change in status.

## 2024-01-06 ENCOUNTER — NON-APPOINTMENT (OUTPATIENT)
Age: 79
End: 2024-01-06

## 2024-01-06 VITALS
TEMPERATURE: 98 F | SYSTOLIC BLOOD PRESSURE: 131 MMHG | HEART RATE: 80 BPM | OXYGEN SATURATION: 99 % | RESPIRATION RATE: 18 BRPM | DIASTOLIC BLOOD PRESSURE: 85 MMHG

## 2024-01-06 PROCEDURE — 99239 HOSP IP/OBS DSCHRG MGMT >30: CPT

## 2024-01-06 RX ORDER — SODIUM CHLORIDE 9 MG/ML
500 INJECTION INTRAMUSCULAR; INTRAVENOUS; SUBCUTANEOUS ONCE
Refills: 0 | Status: COMPLETED | OUTPATIENT
Start: 2024-01-06 | End: 2024-01-06

## 2024-01-06 RX ORDER — ACETAMINOPHEN 500 MG
2 TABLET ORAL
Qty: 0 | Refills: 0 | DISCHARGE
Start: 2024-01-06

## 2024-01-06 RX ORDER — KETOROLAC TROMETHAMINE 30 MG/ML
15 SYRINGE (ML) INJECTION ONCE
Refills: 0 | Status: DISCONTINUED | OUTPATIENT
Start: 2024-01-06 | End: 2024-01-06

## 2024-01-06 RX ADMIN — Medication 15 MILLIGRAM(S): at 10:44

## 2024-01-06 RX ADMIN — LOSARTAN POTASSIUM 25 MILLIGRAM(S): 100 TABLET, FILM COATED ORAL at 05:26

## 2024-01-06 RX ADMIN — AMLODIPINE BESYLATE 10 MILLIGRAM(S): 2.5 TABLET ORAL at 05:26

## 2024-01-06 RX ADMIN — SODIUM CHLORIDE 500 MILLILITER(S): 9 INJECTION INTRAMUSCULAR; INTRAVENOUS; SUBCUTANEOUS at 09:31

## 2024-01-06 RX ADMIN — Medication 650 MILLIGRAM(S): at 09:30

## 2024-01-06 RX ADMIN — PANTOPRAZOLE SODIUM 40 MILLIGRAM(S): 20 TABLET, DELAYED RELEASE ORAL at 05:26

## 2024-01-06 NOTE — PROGRESS NOTE ADULT - PROBLEM SELECTOR PLAN 4
Baseline mental status is conversive and AAOx2-3. Now back to baseline now that headache resolved.  - delirium likely 2/2 pain  resolved

## 2024-01-06 NOTE — PROGRESS NOTE ADULT - PROBLEM SELECTOR PLAN 6
Patient reports developing pain in back of thigh after falling out of bed about 1 week ago. Denies any back pain or pain radiating down leg. No changes in bowel or bladder habits.  - unlikely to be neuropathic given no back pain  - pt reports that mild LLE weakness compared to RLE is at baseline 2/2 previous CVA  - monitor    PT recs rehab, family prefers home, making HHA arrangments
Patient reports developing pain in back of thigh after falling out of bed about 1 week ago. Denies any back pain or pain radiating down leg. No changes in bowel or bladder habits.  - unlikely to be neuropathic given no back pain  - pt reports that mild LLE weakness compared to RLE is at baseline 2/2 previous CVA  - monitor    PT recs rehab
Patient reports developing pain in back of thigh after falling out of bed about 1 week ago. Denies any back pain or pain radiating down leg. No changes in bowel or bladder habits.  - unlikely to be neuropathic given no back pain  - pt reports that mild LLE weakness compared to RLE is at baseline 2/2 previous CVA  - monitor    PT recs rehab
Patient reports developing pain in back of thigh after falling out of bed about 1 week ago. Denies any back pain or pain radiating down leg. No changes in bowel or bladder habits.  - unlikely to be neuropathic given no back pain  - pt reports that mild LLE weakness compared to RLE is at baseline 2/2 previous CVA  - monitor    PT recs rehab, family prefers home, making HHA arrangements  Time spent on discharge 31 minutes coordinating discharge plan and discussing with patient and family.

## 2024-01-06 NOTE — PROGRESS NOTE ADULT - ASSESSMENT
78F PMH HTN, CVA (2012; residual LLE weakness, uses walker), brain aneurysm (diagnosed >15 years ago, no surgery) with recent presentation to [facility] with multiple falls now presenting to [facility] for severe headache and agitation x1 day, now resolved while in the ED. 78F PMH HTN, CVA (2012; residual LLE weakness, uses walker), brain aneurysm (diagnosed >15 years ago, no surgery) with recent presentation to [facility] with multiple falls now presenting to [facility] for severe headache and agitation x1 day, now resolved while in the ED.

## 2024-01-06 NOTE — PROGRESS NOTE ADULT - PROBLEM SELECTOR PLAN 9
CT c-spine at [place] with 1.3cm soft tissue nodule in L parotid  - daughter made aware  - f/u o/p ENT CT c-spine at [place] with 1.3cm soft tissue nodule in L parotid  - daughter made aware  - f/u o/p ENT

## 2024-01-06 NOTE — PROGRESS NOTE ADULT - PROBLEM SELECTOR PLAN 7
- continue home amlodipine 10mg, losartan 25mg

## 2024-01-06 NOTE — PROGRESS NOTE ADULT - PROBLEM SELECTOR PLAN 8
- pt's daughter reports that she will sometimes wheeze when she gets "excited." No formal diagnosis of asthma or COPD. currently breathing comfortably on RA  Plan:  - continue home flovent converted to mometasone while i/p  - albuterol prn

## 2024-01-06 NOTE — PROGRESS NOTE ADULT - PROBLEM SELECTOR PLAN 1
Had a mechanical fall about 1 week prior to admission while getting out of bed and injured her LLE. Since then with multiple mechanical falls, most recently 12/31 with headstrike w/o LOC. 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 Aneurysm known to patient and daughter and was diagnosed 15 years ago and has been being monitored with periodic CT scans   Plan:  - likely post concussive HA with delirium brought on by pain. less likely to be related to aneurysm given CTA head that showed aneurysm was after the headstrike. discussed with neurosurgery, report that since it is a known aneurysm and no hemorrhage on scan with baseline neuro exam, unlikely to be 2/2 hemorrhage or aneurysm rupture. recommend o/p f/u for aneurysm monitoring  - PT recs rehab, however, pt declined rehab, wants to go home with private hire home aide, lives at home with her . DC home with home care, f/u CM  - headache this am, trial of toradol 15mg x1 and IVF NS 500ml  - q4 neuro checks. stat head CT for any change in status. Had a mechanical fall about 1 week prior to admission while getting out of bed and injured her LLE. Since then with multiple mechanical falls, most recently 12/31 with headstrike w/o LOC. Presented to 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 Aneurysm known to patient and daughter and was diagnosed 15 years ago and has been being monitored with periodic CT scans   Plan:  - likely post concussive HA with delirium brought on by pain. less likely to be related to aneurysm given CTA head that showed aneurysm was after the headstrike. discussed with neurosurgery, report that since it is a known aneurysm and no hemorrhage on scan with baseline neuro exam, unlikely to be 2/2 hemorrhage or aneurysm rupture. recommend o/p f/u for aneurysm monitoring  - PT recs rehab, however, pt declined rehab, wants to go home with private hire home aide, lives at home with her . DC home with home care, f/u CM  - headache this am, trial of toradol 15mg x1 and IVF NS 500ml  - q4 neuro checks. stat head CT for any change in status. Had a mechanical fall about 1 week prior to admission while getting out of bed and injured her LLE. Since then with multiple mechanical falls, most recently 12/31 with headstrike w/o LOC. 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 Aneurysm known to patient and daughter and was diagnosed 15 years ago and has been being monitored with periodic CT scans   Plan:  - likely post concussive HA with delirium brought on by pain. less likely to be related to aneurysm given CTA head that showed aneurysm was after the headstrike. discussed with neurosurgery, report that since it is a known aneurysm and no hemorrhage on scan with baseline neuro exam, unlikely to be 2/2 hemorrhage or aneurysm rupture. recommend o/p f/u for aneurysm monitoring  -outpt f/u neurosurgery Dr. Santoro  - PT recs rehab, however, pt declined rehab, wants to go home with private hire home aide, lives at home with her . DC home with home care, f/u CM  - headache this am, trial of toradol 15mg x1 and IVF NS 500ml  - q4 neuro checks. stat head CT for any change in status. Had a mechanical fall about 1 week prior to admission while getting out of bed and injured her LLE. Since then with multiple mechanical falls, most recently 12/31 with headstrike w/o LOC. 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 Aneurysm known to patient and daughter and was diagnosed 15 years ago and has been being monitored with periodic CT scans   Plan:  - likely post concussive HA with delirium brought on by pain. less likely to be related to aneurysm given CTA head that showed aneurysm was after the headstrike. discussed with neurosurgery, report that since it is a known aneurysm and no hemorrhage on scan with baseline neuro exam, unlikely to be 2/2 hemorrhage or aneurysm rupture. recommend o/p f/u for aneurysm monitoring  -outpt f/u neurosurgery Dr. Santoro  - PT recs rehab, however, pt declined rehab, wants to go home with private hire home aide, lives at home with her . DC home with home care, f/u CM  - headache this am, trial of toradol 15mg x1 and IVF NS 500ml  - q4 neuro checks. stat head CT for any change in status.

## 2024-01-06 NOTE — PROGRESS NOTE ADULT - PROBLEM SELECTOR PLAN 5
- as above  - f/u PT  can have MRI to r/o stenosis as outpt
- as above  - PT recs GIUSEPPE, pt declined, home with home care/home PT  can have MRI to r/o stenosis as outpt

## 2024-01-06 NOTE — PROGRESS NOTE ADULT - PROBLEM SELECTOR PLAN 3
- CTH: 2.2 x 1.5 x 2 cm extra-axial calcific focus in the left parafalcine region, probably meningioma.  - discussed with neurosurgery, seen on CTH from 11 years ago as well  Plan:  - f/u o/p

## 2024-01-06 NOTE — PROGRESS NOTE ADULT - PROBLEM SELECTOR PLAN 10
- incidental L axillary lymph node enlargement on CT at [place]  - daughter made aware  - f/u PCP o/p - incidental L axillary lymph node enlargement on CT at [place]  - daughter made aware  - f/u PCP o/p

## 2024-01-06 NOTE — PROGRESS NOTE ADULT - SUBJECTIVE AND OBJECTIVE BOX
Dr. Sammi Butler  Pager [phone]    PROGRESS NOTE:     Patient is a 78y old  Female who presents with a chief complaint of headache, agitation (05 Jan 2024 12:29)      SUBJECTIVE / OVERNIGHT EVENTS: pt reports headache this am, no focal deficit  ADDITIONAL REVIEW OF SYSTEMS: reports she doesn't want to go to rehab, wants home with private hire home aide, lives with  at home     MEDICATIONS  (STANDING):  amLODIPine   Tablet 10 milliGRAM(s) Oral daily  atorvastatin 40 milliGRAM(s) Oral at bedtime  enoxaparin Injectable 40 milliGRAM(s) SubCutaneous every 24 hours  ketorolac   Injectable 15 milliGRAM(s) IV Push once  losartan 25 milliGRAM(s) Oral daily  mometasone 220 MICROgram(s) Inhaler 2 Puff(s) Inhalation daily  pantoprazole    Tablet 40 milliGRAM(s) Oral before breakfast    MEDICATIONS  (PRN):  acetaminophen     Tablet .. 650 milliGRAM(s) Oral every 6 hours PRN Temp greater or equal to 38C (100.4F), Mild Pain (1 - 3)  albuterol    90 MICROgram(s) HFA Inhaler 2 Puff(s) Inhalation every 6 hours PRN for shortness of breath and/or wheezing  benzonatate 100 milliGRAM(s) Oral three times a day PRN for cough  melatonin 3 milliGRAM(s) Oral at bedtime PRN Insomnia      CAPILLARY BLOOD GLUCOSE        I&O's Summary      PHYSICAL EXAM:  Vital Signs Last 24 Hrs  T(C): 36.8 (06 Jan 2024 05:25), Max: 36.9 (05 Jan 2024 21:05)  T(F): 98.3 (06 Jan 2024 05:25), Max: 98.5 (05 Jan 2024 21:05)  HR: 80 (06 Jan 2024 05:25) (80 - 92)  BP: 131/85 (06 Jan 2024 05:25) (129/64 - 157/83)  BP(mean): --  RR: 18 (06 Jan 2024 05:25) (17 - 18)  SpO2: 99% (06 Jan 2024 05:25) (99% - 100%)    Parameters below as of 06 Jan 2024 05:25  Patient On (Oxygen Delivery Method): room air      CONSTITUTIONAL: NAD, well-developed, uncomfortable from headache   RESPIRATORY: Normal respiratory effort; lungs are clear to auscultation bilaterally  CARDIOVASCULAR: Regular rate and rhythm, normal S1 and S2, no murmur/rub/gallop; No lower extremity edema; Peripheral pulses are 2+ bilaterally  ABDOMEN: Nontender to palpation, normoactive bowel sounds, no rebound/guarding; No hepatosplenomegaly  MUSCULOSKELETAL: no clubbing or cyanosis of digits; no joint swelling or tenderness to palpation  PSYCH: A+O to person, place, and time; affect appropriate    LABS:                  RADIOLOGY & ADDITIONAL TESTS:  Results Reviewed:   Imaging Personally Reviewed:  < from: CT Head No Cont (01.01.24 @ 21:27) >  IMPRESSION:  No CT evidence of acute intracranial pathology.    Moderate chronic microvascular ischemic disease.    Approximately 2.2 x 1.5 x 2 cm extra-axial calcific focus in the left   parafalcine region, probably meningioma.      Electrocardiogram Personally Reviewed:    COORDINATION OF CARE:  Care Discussed with Consultants/Other Providers [Y/N]:  Prior or Outpatient Records Reviewed [Y/N]:   Dr. Sammi Butler  Pager [phone]    PROGRESS NOTE:     Patient is a 78y old  Female who presents with a chief complaint of headache, agitation (05 Jan 2024 12:29)      SUBJECTIVE / OVERNIGHT EVENTS: pt reports headache this am, no focal deficit  ADDITIONAL REVIEW OF SYSTEMS: reports she doesn't want to go to rehab, wants home with private hire home aide, lives with  at home     MEDICATIONS  (STANDING):  amLODIPine   Tablet 10 milliGRAM(s) Oral daily  atorvastatin 40 milliGRAM(s) Oral at bedtime  enoxaparin Injectable 40 milliGRAM(s) SubCutaneous every 24 hours  ketorolac   Injectable 15 milliGRAM(s) IV Push once  losartan 25 milliGRAM(s) Oral daily  mometasone 220 MICROgram(s) Inhaler 2 Puff(s) Inhalation daily  pantoprazole    Tablet 40 milliGRAM(s) Oral before breakfast    MEDICATIONS  (PRN):  acetaminophen     Tablet .. 650 milliGRAM(s) Oral every 6 hours PRN Temp greater or equal to 38C (100.4F), Mild Pain (1 - 3)  albuterol    90 MICROgram(s) HFA Inhaler 2 Puff(s) Inhalation every 6 hours PRN for shortness of breath and/or wheezing  benzonatate 100 milliGRAM(s) Oral three times a day PRN for cough  melatonin 3 milliGRAM(s) Oral at bedtime PRN Insomnia      CAPILLARY BLOOD GLUCOSE        I&O's Summary      PHYSICAL EXAM:  Vital Signs Last 24 Hrs  T(C): 36.8 (06 Jan 2024 05:25), Max: 36.9 (05 Jan 2024 21:05)  T(F): 98.3 (06 Jan 2024 05:25), Max: 98.5 (05 Jan 2024 21:05)  HR: 80 (06 Jan 2024 05:25) (80 - 92)  BP: 131/85 (06 Jan 2024 05:25) (129/64 - 157/83)  BP(mean): --  RR: 18 (06 Jan 2024 05:25) (17 - 18)  SpO2: 99% (06 Jan 2024 05:25) (99% - 100%)    Parameters below as of 06 Jan 2024 05:25  Patient On (Oxygen Delivery Method): room air      CONSTITUTIONAL: NAD, well-developed, uncomfortable from headache   RESPIRATORY: Normal respiratory effort; lungs are clear to auscultation bilaterally  CARDIOVASCULAR: Regular rate and rhythm, normal S1 and S2, no murmur/rub/gallop; No lower extremity edema; Peripheral pulses are 2+ bilaterally  ABDOMEN: Nontender to palpation, normoactive bowel sounds, no rebound/guarding; No hepatosplenomegaly  MUSCULOSKELETAL: no clubbing or cyanosis of digits; no joint swelling or tenderness to palpation  PSYCH: A+O to person, place, and time; affect appropriate    LABS:                  RADIOLOGY & ADDITIONAL TESTS:  Results Reviewed:   Imaging Personally Reviewed:  < from: CT Head No Cont (01.01.24 @ 21:27) >  IMPRESSION:  No CT evidence of acute intracranial pathology.    Moderate chronic microvascular ischemic disease.    Approximately 2.2 x 1.5 x 2 cm extra-axial calcific focus in the left   parafalcine region, probably meningioma.      Electrocardiogram Personally Reviewed:    COORDINATION OF CARE:  Care Discussed with Consultants/Other Providers [Y/N]:  Prior or Outpatient Records Reviewed [Y/N]:

## 2024-01-08 ENCOUNTER — NON-APPOINTMENT (OUTPATIENT)
Age: 79
End: 2024-01-08

## 2024-01-09 RX ORDER — FLUTICASONE PROPIONATE 220 MCG
1 AEROSOL WITH ADAPTER (GRAM) INHALATION
Refills: 0 | DISCHARGE

## 2024-01-09 RX ORDER — AMLODIPINE BESYLATE 2.5 MG/1
1 TABLET ORAL
Refills: 0 | DISCHARGE

## 2024-01-09 RX ORDER — ALBUTEROL 90 UG/1
2 AEROSOL, METERED ORAL
Refills: 0 | DISCHARGE

## 2024-01-09 RX ORDER — ATORVASTATIN CALCIUM 80 MG/1
1 TABLET, FILM COATED ORAL
Refills: 0 | DISCHARGE

## 2024-01-09 RX ORDER — OMEPRAZOLE 10 MG/1
1 CAPSULE, DELAYED RELEASE ORAL
Refills: 0 | DISCHARGE

## 2024-01-09 RX ORDER — LOSARTAN POTASSIUM 100 MG/1
1 TABLET, FILM COATED ORAL
Refills: 0 | DISCHARGE

## 2024-01-10 PROBLEM — I63.9 CEREBRAL INFARCTION, UNSPECIFIED: Chronic | Status: ACTIVE | Noted: 2024-01-01

## 2024-01-10 PROBLEM — I10 ESSENTIAL (PRIMARY) HYPERTENSION: Chronic | Status: ACTIVE | Noted: 2024-01-01

## 2024-01-12 ENCOUNTER — APPOINTMENT (OUTPATIENT)
Dept: INTERNAL MEDICINE | Facility: CLINIC | Age: 79
End: 2024-01-12

## 2024-01-19 ENCOUNTER — APPOINTMENT (OUTPATIENT)
Dept: INTERNAL MEDICINE | Facility: CLINIC | Age: 79
End: 2024-01-19
Payer: MEDICARE

## 2024-01-19 VITALS
DIASTOLIC BLOOD PRESSURE: 80 MMHG | HEIGHT: 65 IN | HEART RATE: 72 BPM | TEMPERATURE: 98.1 F | OXYGEN SATURATION: 96 % | SYSTOLIC BLOOD PRESSURE: 144 MMHG

## 2024-01-19 DIAGNOSIS — R05.8 OTHER SPECIFIED COUGH: ICD-10-CM

## 2024-01-19 DIAGNOSIS — Z09 ENCOUNTER FOR FOLLOW-UP EXAMINATION AFTER COMPLETED TREATMENT FOR CONDITIONS OTHER THAN MALIGNANT NEOPLASM: ICD-10-CM

## 2024-01-19 DIAGNOSIS — R73.01 IMPAIRED FASTING GLUCOSE: ICD-10-CM

## 2024-01-19 PROCEDURE — 99495 TRANSJ CARE MGMT MOD F2F 14D: CPT | Mod: 25

## 2024-01-19 PROCEDURE — 36415 COLL VENOUS BLD VENIPUNCTURE: CPT

## 2024-01-19 RX ORDER — ALBUTEROL SULFATE 90 UG/1
108 (90 BASE) INHALANT RESPIRATORY (INHALATION)
Qty: 1 | Refills: 0 | Status: ACTIVE | COMMUNITY
Start: 2022-07-01 | End: 1900-01-01

## 2024-01-19 NOTE — PHYSICAL EXAM
[No Acute Distress] : no acute distress [Normal] : soft, non-tender, non-distended, no masses palpated, no HSM and normal bowel sounds [de-identified] : walking with walker

## 2024-01-19 NOTE — HISTORY OF PRESENT ILLNESS
[FreeTextEntry2] : Accompanied with daughter  Reason for Admission headache, agitation Hospital Course  Pt is a 77 yo female with ho HTN, CVA p/w headache and weakness was seen approx 1 week ago after a fall at [facility], eval there and dc home as family did not want admission returns here for intermittent persistent HA CT at [facility] and here reveals a chronic know meningioma, stable; seen by NS, no intervention PT rec rehab, family wishes for home family arranging HHA for safe dispo  fell 3 times since Christmas 12/25- last was 1/1/24 - when she hit her head - going out the door - was confused later went to ER- send home - later that day dev headache - went back to ER got admitted w/u ok - rec home PT  Her left leg seems to be weak   getting HHA - 8 to 6 monday to Saturday - help with her ADL - walking with walker, cane  -getting home Pt had 1 session - 2 x a week

## 2024-01-19 NOTE — REVIEW OF SYSTEMS
[Cough] : cough [Joint Pain] : joint pain [Muscle Pain] : muscle pain [Negative] : Gastrointestinal [Constipation] : no constipation

## 2024-01-19 NOTE — ASSESSMENT
[FreeTextEntry1] :  Cough with mucus -2 week  -start Flonase 1 spray each nostril BID  -start benzonate 100 TID  - cont albuterol 2 puff q 8  -zpak as directed  -get cbc and cmp   hx  headache- better now with Tylenol PM  -concussion due to your recent fall. -1/1/24 -Ct head -IMPRESSION: No CT evidence of acute intracranial pathology. Moderate chronic microvascular ischemic disease. Approximately 2.2 x 1.5 x 2 cm extra-axial calcific focus in the left parafalcine region, probably meningioma.   HTN (hypertension) -cont amlodipine 10 and losartan 25  hx Frequent falls - PT recommended rehab;, doing home PT 2 x a week  - waking with walker, cane  -has HHA 6 days a week   hx Brain aneurysm -12/31/23 -Ct angio brain and neck -IMPRESSION: Intracranial CTA: No large vessel occlusion. 5 mm right pericallosal aneurysm, neurosurgical consultation recommended. Extracranial CTA: Mild stenosis of the mid right internal carotid artery. saw neurosurgery as inpt - no intervention adv to f/u referral placed -has appt 1/31/24   Lesion of parotid gland CT head 12/31/23 -IMPRESSION: CT head: No acute intracranial hemorrhage or calvarial fracture.CT cervical spine: No acute fracture or malalignment. 1.3 cm soft tissue nodule in the left parotid gland which may represent benign or malignant etiology and outpatient ENT evaluation is recommended. - incidentally noted to have a small lesion on LEFT parotid as well as a LEFT prominent lymph node.   rtc cpe

## 2024-01-22 LAB
ALBUMIN SERPL ELPH-MCNC: 4.2 G/DL
ALP BLD-CCNC: 156 U/L
ALT SERPL-CCNC: 20 U/L
ANION GAP SERPL CALC-SCNC: 14 MMOL/L
AST SERPL-CCNC: 13 U/L
BASOPHILS # BLD AUTO: 0.07 K/UL
BASOPHILS NFR BLD AUTO: 0.8 %
BILIRUB SERPL-MCNC: 0.4 MG/DL
BUN SERPL-MCNC: 15 MG/DL
CALCIUM SERPL-MCNC: 9.5 MG/DL
CHLORIDE SERPL-SCNC: 105 MMOL/L
CO2 SERPL-SCNC: 23 MMOL/L
CREAT SERPL-MCNC: 1.04 MG/DL
EGFR: 55 ML/MIN/1.73M2
EOSINOPHIL # BLD AUTO: 0.48 K/UL
EOSINOPHIL NFR BLD AUTO: 5.8 %
ESTIMATED AVERAGE GLUCOSE: 114 MG/DL
GLUCOSE SERPL-MCNC: 113 MG/DL
HBA1C MFR BLD HPLC: 5.6 %
HCT VFR BLD CALC: 40.8 %
HGB BLD-MCNC: 13.1 G/DL
IMM GRANULOCYTES NFR BLD AUTO: 0.2 %
LYMPHOCYTES # BLD AUTO: 1.53 K/UL
LYMPHOCYTES NFR BLD AUTO: 18.6 %
MAN DIFF?: NORMAL
MCHC RBC-ENTMCNC: 27.7 PG
MCHC RBC-ENTMCNC: 32.1 GM/DL
MCV RBC AUTO: 86.3 FL
MONOCYTES # BLD AUTO: 0.47 K/UL
MONOCYTES NFR BLD AUTO: 5.7 %
NEUTROPHILS # BLD AUTO: 5.67 K/UL
NEUTROPHILS NFR BLD AUTO: 68.9 %
PLATELET # BLD AUTO: 355 K/UL
POTASSIUM SERPL-SCNC: 4 MMOL/L
PROT SERPL-MCNC: 7 G/DL
RBC # BLD: 4.73 M/UL
RBC # FLD: 16.7 %
SODIUM SERPL-SCNC: 142 MMOL/L
WBC # FLD AUTO: 8.24 K/UL

## 2024-01-31 ENCOUNTER — APPOINTMENT (OUTPATIENT)
Dept: NEUROSURGERY | Facility: CLINIC | Age: 79
End: 2024-01-31
Payer: MEDICARE

## 2024-01-31 ENCOUNTER — NON-APPOINTMENT (OUTPATIENT)
Age: 79
End: 2024-01-31

## 2024-01-31 VITALS
OXYGEN SATURATION: 97 % | WEIGHT: 218 LBS | TEMPERATURE: 98.3 F | DIASTOLIC BLOOD PRESSURE: 86 MMHG | HEIGHT: 65 IN | HEART RATE: 98 BPM | BODY MASS INDEX: 36.32 KG/M2 | SYSTOLIC BLOOD PRESSURE: 170 MMHG

## 2024-01-31 VITALS — DIASTOLIC BLOOD PRESSURE: 72 MMHG | SYSTOLIC BLOOD PRESSURE: 167 MMHG

## 2024-01-31 PROCEDURE — 99205 OFFICE O/P NEW HI 60 MIN: CPT

## 2024-01-31 RX ORDER — ACETAMINOPHEN 500 MG
500 TABLET ORAL
Refills: 0 | Status: ACTIVE | COMMUNITY

## 2024-01-31 NOTE — PHYSICAL EXAM
[Person] : oriented to person [Place] : oriented to place [Time] : oriented to time [FreeTextEntry8] : uses walker for assistance with ambulation

## 2024-01-31 NOTE — DATA REVIEWED
[de-identified] : CT head non con 1/1/24 [de-identified] : CTA head and neck 12/31/23 [de-identified] : MRI brain 2012 - [facility]tan Sanders

## 2024-01-31 NOTE — REASON FOR VISIT
[Post Hospitalization] : a post hospitalization visit [Friend] : friend [Family Member] : family member

## 2024-01-31 NOTE — ASSESSMENT
[FreeTextEntry1] : IMPRESSION: 78F with PMH of HTN, HLD, CVA due to hypertension per daughter in 2012, known h/o meningioma diagnosed 15 years ago, who [facility] with multiple falls, weakness. CTA head/neck showed newly diagnosed incidental unruptured 5mm aneurysm along the right ERIC. Returned to [facility] ED 1/1/24 with intermittent severe HA, agitation. CTH showed no evidence of acute intracranial pathology. And known stable 2.2 x 1.5 x 2cm left parafalcine meningioma compared to brain MRI from 2012.  Counseled patient on the natural history of benign meningiomas. It appears calcified and is relatively small with no significant brain compression or edema. Recent CT head compared to prior MRI brain obtained in 2012, which is more or less stable.   The aneurysm appears to be originating along the distal A2 segment of the right ERIC, just proximal to the bifurcation into the pericallosal and callosomarginal. There is no vascular imaging in the past to compare to, but an MRI w/wo from 2012 does not show the aneurysm. This may just be due to lack of high quality vessel imaging sequences, or it may be new from 2012.   Educated the patient on signs and symptoms of aneurysm rupture or subarachnoid hemorrhage, which is a life-threatening condition. Should she have severe, sudden onset worst headache of her life, advised that she must seek medical attention immediately and go to the emergency room if this occurs.   Options moving forward include conservative management and will accept the risk of rupture that this aneurysm poses, proceeding with diagnostic cerebral angiography for further evaluation of the unruptured aneurysm to obtain more information about the aneurysm and assess its size and morphology in possible preparation for treatment, or conservatively monitor with yearly MRAs. The risks, benefits, alternatives, complications and personnel associated with the cerebral angiogram procedure were discussed with the patient and family in great detail. Will take her advanced age and interval imaging into consideration prior to proceeding with aneurysm treatment.     PLAN: MRI brain w/wo, MRA head non con within the next few weeks Follow up phone call after to review results - Daughter: Altagracia Pacheco- [phone]

## 2024-02-07 ENCOUNTER — APPOINTMENT (OUTPATIENT)
Dept: MRI IMAGING | Facility: CLINIC | Age: 79
End: 2024-02-07
Payer: MEDICARE

## 2024-02-07 PROCEDURE — 70544 MR ANGIOGRAPHY HEAD W/O DYE: CPT | Mod: 59

## 2024-02-07 PROCEDURE — A9585: CPT

## 2024-02-07 PROCEDURE — 70553 MRI BRAIN STEM W/O & W/DYE: CPT

## 2024-02-14 ENCOUNTER — APPOINTMENT (OUTPATIENT)
Dept: NEUROSURGERY | Facility: CLINIC | Age: 79
End: 2024-02-14
Payer: MEDICARE

## 2024-02-14 DIAGNOSIS — I67.1 CEREBRAL ANEURYSM, NONRUPTURED: ICD-10-CM

## 2024-02-14 DIAGNOSIS — D32.9 BENIGN NEOPLASM OF MENINGES, UNSPECIFIED: ICD-10-CM

## 2024-02-14 PROCEDURE — 99442: CPT | Mod: 93

## 2024-02-14 NOTE — HISTORY OF PRESENT ILLNESS
[FreeTextEntry1] : SANDHYA JOSÉ is a 78 year old female with PMH of HTN, HLD, CVA in 2012 with residual LLE weakness, uses walker, known h/o meningioma 15 years ago, (daughter states the meningioma was known diagnosis but not the aneurysm) who initially presented to [facility] 12/31/23 with multiple falls, weakness. CTA head/neck showed unruptured 5mm right pericallosal/ERIC aneurysm. Per daughter and pt, the meningioma (NOT aneurysm) was diagnosed 15 years ago and was being conservatively monitored but have not followed up recently. Returned to [facility] ED 1/1/24 with intermittent severe headaches, agitation. CT head showed no evidence of acute intracranial pathology. Moderate chronic microvascular ischemic disease. And known 2.2 x 1.5 x 2cm left parafalcine meningioma. Headaches likely post-concussive with delirium brought on by pain.  Today, patient and daughter present for a follow up phone call to review results of repeat outpatient MRI brain w/wo IV contrast and MRA brain non con done 2/7/24.

## 2024-02-14 NOTE — REASON FOR VISIT
[Home] : at home, [unfilled] , at the time of the visit. [Medical Office: ([facility])___] : at the medical office located in  [Family Member] : family member [Verbal consent obtained from patient] : the patient, [unfilled] [Follow-Up: _____] : a [unfilled] follow-up visit [FreeTextEntry1] : Reviewed results of repeat MRI head w/wo IV contrast and MRA head non con done 2/7/24

## 2024-02-14 NOTE — ASSESSMENT
[FreeTextEntry1] : IMPRESSION: 78F with PMH of HTN, HLD, CVA due to hypertension per daughter in 2012, known h/o meningioma diagnosed 15 years ago, who [facility] with multiple falls, weakness. CTA head/neck showed newly diagnosed incidental unruptured 5mm aneurysm along the right ERIC. Returned to [facility] ED 1/1/24 with intermittent severe HA, agitation. CTH showed no evidence of acute intracranial pathology, and known stable 2.2 x 1.5 x 2cm left parafalcine meningioma compared to brain MRI from 2012. She does not regularly follow a neurologist for history of stroke. Had been following PCP for monitoring of meningioma.   Repeat MRI brain shows essentially stable left parietal meningioma compared to prior MRI from 2012, measuring 2.1cm with possible invasion into the SSS. Since there has been no progression in over 10 years, would continue to manage this conservatively.   Other findings on the MRI include multiple subacute to chronic infarcts bilaterally, as well as numerous remote petechial hemorrhages diffusely throughout the brain. These could be amyloid angiopathy, nonspecific microhemorrhages or numerous tiny cavernomas.   MRA brain 2/7/24 shows 5mm right ERIC aneurysm at the A2/3 junction, as seen on CTA. There are signs of some finding on the MRI w/wo in 2012 but evaluation is limited due to lack of dedicated vascular imaging. Aneurysms at this location are considered slightly higher risk, but size is overall still small. She is also relatively higher risk for procedures given advanced age, atherosclerotic disease, and ischemic stroke history. Will have patient evaluated by stroke neurology and will discuss at vascular conference.   Educated the patient on signs and symptoms of aneurysm rupture or subarachnoid hemorrhage, which is a life-threatening condition. Should she have severe, sudden onset worst headache of her life, advised that she must seek medical attention immediately and go to the emergency room if this occurs.   PLAN: Referral to vascular neurology for stroke management and prevention - Dr. Dennis Lagunas Plan to present case in NeuroIR Conference for consideration of aneurysm treatment vs. conservative management Will touch base with daughter, Altagracia Pacheco after above - [phone]

## 2024-03-16 ENCOUNTER — APPOINTMENT (OUTPATIENT)
Dept: INTERNAL MEDICINE | Facility: CLINIC | Age: 79
End: 2024-03-16
Payer: MEDICARE

## 2024-03-16 VITALS
HEART RATE: 90 BPM | HEIGHT: 65 IN | DIASTOLIC BLOOD PRESSURE: 92 MMHG | OXYGEN SATURATION: 98 % | SYSTOLIC BLOOD PRESSURE: 172 MMHG | RESPIRATION RATE: 16 BRPM

## 2024-03-16 DIAGNOSIS — E78.2 MIXED HYPERLIPIDEMIA: ICD-10-CM

## 2024-03-16 PROCEDURE — 99213 OFFICE O/P EST LOW 20 MIN: CPT

## 2024-03-16 RX ORDER — OMEPRAZOLE 40 MG/1
40 CAPSULE, DELAYED RELEASE ORAL
Qty: 30 | Refills: 0 | Status: DISCONTINUED | COMMUNITY
Start: 2023-05-10 | End: 2024-03-16

## 2024-03-16 NOTE — HISTORY OF PRESENT ILLNESS
[FreeTextEntry1] : here for f/u bc her BP has been running high   accompanied by daughter who brought  log of BP  multiple high reading  no new meds  no change diet  no chest pain or SOB  no  / GI issue

## 2024-03-16 NOTE — PHYSICAL EXAM
[No Acute Distress] : no acute distress [Normal Voice/Communication] : normal voice/communication [Normal Sclera/Conjunctiva] : normal sclera/conjunctiva [No JVD] : no jugular venous distention [Normal] : normal rate, regular rhythm, normal S1 and S2 and no murmur heard

## 2024-03-16 NOTE — ASSESSMENT
[FreeTextEntry1] : 1) HTN  - ct amlodipine 10  - inc losartan to 50  - f/u in 4 week   low salt diet   2) HL - ct atorvastatin   3) CVA - has L hemiparesis - [place] [facility]

## 2024-04-30 ENCOUNTER — APPOINTMENT (OUTPATIENT)
Dept: INTERNAL MEDICINE | Facility: CLINIC | Age: 79
End: 2024-04-30
Payer: MEDICARE

## 2024-04-30 VITALS
TEMPERATURE: 98.1 F | HEART RATE: 104 BPM | SYSTOLIC BLOOD PRESSURE: 136 MMHG | OXYGEN SATURATION: 98 % | BODY MASS INDEX: 35.61 KG/M2 | DIASTOLIC BLOOD PRESSURE: 84 MMHG | WEIGHT: 214 LBS

## 2024-04-30 DIAGNOSIS — Z00.00 ENCOUNTER FOR GENERAL ADULT MEDICAL EXAMINATION W/OUT ABNORMAL FINDINGS: ICD-10-CM

## 2024-04-30 DIAGNOSIS — L98.9 DISORDER OF THE SKIN AND SUBCUTANEOUS TISSUE, UNSPECIFIED: ICD-10-CM

## 2024-04-30 DIAGNOSIS — K11.9 DISEASE OF SALIVARY GLAND, UNSPECIFIED: ICD-10-CM

## 2024-04-30 DIAGNOSIS — I63.9 CEREBRAL INFARCTION, UNSPECIFIED: ICD-10-CM

## 2024-04-30 DIAGNOSIS — I10 ESSENTIAL (PRIMARY) HYPERTENSION: ICD-10-CM

## 2024-04-30 DIAGNOSIS — N18.31 CHRONIC KIDNEY DISEASE, STAGE 3A: ICD-10-CM

## 2024-04-30 PROCEDURE — G0439: CPT

## 2024-04-30 RX ORDER — FLUTICASONE PROPIONATE 50 UG/1
50 SPRAY, METERED NASAL TWICE DAILY
Qty: 1 | Refills: 0 | Status: COMPLETED | COMMUNITY
Start: 2024-01-19 | End: 2024-04-30

## 2024-04-30 RX ORDER — AZITHROMYCIN 250 MG/1
250 TABLET, FILM COATED ORAL
Qty: 1 | Refills: 0 | Status: COMPLETED | COMMUNITY
Start: 2024-01-19 | End: 2024-04-30

## 2024-04-30 RX ORDER — BENZONATATE 100 MG/1
100 CAPSULE ORAL 3 TIMES DAILY
Qty: 42 | Refills: 0 | Status: COMPLETED | COMMUNITY
Start: 2024-01-19 | End: 2024-04-30

## 2024-04-30 RX ORDER — ASPIRIN ENTERIC COATED TABLETS 81 MG 81 MG/1
81 TABLET, DELAYED RELEASE ORAL DAILY
Qty: 90 | Refills: 3 | Status: ACTIVE | COMMUNITY
Start: 2024-04-30

## 2024-04-30 RX ORDER — DOCUSATE SODIUM 100 MG/1
100 CAPSULE ORAL
Qty: 1 | Refills: 5 | Status: COMPLETED | COMMUNITY
Start: 2022-02-11 | End: 2024-04-30

## 2024-04-30 RX ORDER — DOCUSATE SODIUM 100 MG/1
100 CAPSULE, LIQUID FILLED ORAL
Qty: 60 | Refills: 5 | Status: COMPLETED | COMMUNITY
Start: 2023-02-13 | End: 2024-04-30

## 2024-04-30 RX ORDER — LORATADINE 10 MG
17 TABLET,DISINTEGRATING ORAL
Qty: 1 | Refills: 1 | Status: COMPLETED | COMMUNITY
Start: 2022-02-11 | End: 2024-04-30

## 2024-04-30 NOTE — PHYSICAL EXAM
[No Acute Distress] : no acute distress [Well-Appearing] : well-appearing [Normal Voice/Communication] : normal voice/communication [Pedal Pulses Present] : the pedal pulses are present [Normal Supraclavicular Nodes] : no supraclavicular lymphadenopathy [Normal] : affect was normal and insight and judgment were intact

## 2024-04-30 NOTE — HEALTH RISK ASSESSMENT
[Good] : ~his/her~  mood as  good [No] : No [0] : 2) Feeling down, depressed, or hopeless: Not at all (0) [PHQ-2 Negative - No further assessment needed] : PHQ-2 Negative - No further assessment needed [With Significant Other] : lives with significant other [] :  [Independent] : managing medications [Some assistance needed] : managing finances [Full assistance needed] : doing laundry [Never] : Never

## 2024-05-01 LAB
ANION GAP SERPL CALC-SCNC: 14 MMOL/L
BUN SERPL-MCNC: 17 MG/DL
CALCIUM SERPL-MCNC: 10.1 MG/DL
CHLORIDE SERPL-SCNC: 105 MMOL/L
CO2 SERPL-SCNC: 23 MMOL/L
CREAT SERPL-MCNC: 1.08 MG/DL
EGFR: 52 ML/MIN/1.73M2
GLUCOSE SERPL-MCNC: 127 MG/DL
POTASSIUM SERPL-SCNC: 3.6 MMOL/L
SODIUM SERPL-SCNC: 141 MMOL/L

## 2024-05-25 ENCOUNTER — RESULT REVIEW (OUTPATIENT)
Age: 79
End: 2024-05-25

## 2024-05-25 ENCOUNTER — OUTPATIENT (OUTPATIENT)
Dept: OUTPATIENT SERVICES | Facility: HOSPITAL | Age: 79
LOS: 1 days | End: 2024-05-25
Payer: MEDICARE

## 2024-05-25 ENCOUNTER — APPOINTMENT (OUTPATIENT)
Dept: MAMMOGRAPHY | Facility: IMAGING CENTER | Age: 79
End: 2024-05-25
Payer: MEDICARE

## 2024-05-25 DIAGNOSIS — Z00.00 ENCOUNTER FOR GENERAL ADULT MEDICAL EXAMINATION WITHOUT ABNORMAL FINDINGS: ICD-10-CM

## 2024-05-25 DIAGNOSIS — N18.31 CHRONIC KIDNEY DISEASE, STAGE 3A: ICD-10-CM

## 2024-05-25 PROCEDURE — 77067 SCR MAMMO BI INCL CAD: CPT | Mod: 26

## 2024-05-25 PROCEDURE — 77063 BREAST TOMOSYNTHESIS BI: CPT

## 2024-05-25 PROCEDURE — 77067 SCR MAMMO BI INCL CAD: CPT

## 2024-05-25 PROCEDURE — 77063 BREAST TOMOSYNTHESIS BI: CPT | Mod: 26

## 2024-05-29 ENCOUNTER — APPOINTMENT (OUTPATIENT)
Dept: DERMATOLOGY | Facility: CLINIC | Age: 79
End: 2024-05-29
Payer: MEDICARE

## 2024-05-29 ENCOUNTER — LABORATORY RESULT (OUTPATIENT)
Age: 79
End: 2024-05-29

## 2024-05-29 DIAGNOSIS — D48.5 NEOPLASM OF UNCERTAIN BEHAVIOR OF SKIN: ICD-10-CM

## 2024-05-29 PROCEDURE — 11102 TANGNTL BX SKIN SINGLE LES: CPT

## 2024-05-29 NOTE — ASSESSMENT
[FreeTextEntry1] : #NUBS, R palm poroma vs PG vs MM Biopsy by Shave  The risks/benefits/alternatives of skin biopsy were explained to the patient, which include and are not limited to bleeding, infection, scarring or discoloration of skin, and recurrence of lesion. Patient expressed understanding of these risks and provided consent to the procedure. Time out with verification of patient and lesion site was performed. Site was prepped with rubbing alcohol, lidocaine with epinephrine was injected for anesthesia, and biopsy was performed. Specimen sent to path. Procedure was without complication and well tolerated. Wound care was discussed.

## 2024-05-29 NOTE — PHYSICAL EXAM
[Alert] : alert [Oriented x 3] : ~L oriented x 3 [Well Nourished] : well nourished [Conjunctiva Non-injected] : conjunctiva non-injected [No Visual Lymphadenopathy] : no visual  lymphadenopathy [No Clubbing] : no clubbing [No Edema] : no edema [No Bromhidrosis] : no bromhidrosis [No Chromhidrosis] : no chromhidrosis [FreeTextEntry3] : R palm with 0.6 x0.6 cm friable red round papule with collarette

## 2024-05-29 NOTE — HISTORY OF PRESENT ILLNESS
[FreeTextEntry1] : np growth [de-identified] : Referred by: Dr. Puri   Ms. SANDHYA JOSÉ  is a 78 year old F here w/ daughter for evaluation of below  bump on R hand x 4 months, not healing, bleeding no personal or family h/o skin cancer

## 2024-05-29 NOTE — CONSULT LETTER
[Dear  ___] : Dear  [unfilled], [Consult Letter:] : I had the pleasure of evaluating your patient, [unfilled]. [Please see my note below.] : Please see my note below. [Consult Closing:] : Thank you very much for allowing me to participate in the care of this patient.  If you have any questions, please do not hesitate to contact me. [Sincerely,] : Sincerely, [FreeTextEntry3] : Safia Manley MD Department of Dermatology [place] and Audrey [facility] at [facility]

## 2024-06-05 ENCOUNTER — NON-APPOINTMENT (OUTPATIENT)
Age: 79
End: 2024-06-05

## 2024-06-11 RX ORDER — LOSARTAN POTASSIUM 50 MG/1
50 TABLET, FILM COATED ORAL DAILY
Qty: 90 | Refills: 3 | Status: ACTIVE | COMMUNITY
Start: 2022-08-10 | End: 1900-01-01

## 2024-06-11 RX ORDER — LOSARTAN POTASSIUM 25 MG/1
25 TABLET, FILM COATED ORAL
Qty: 90 | Refills: 3 | Status: ACTIVE | COMMUNITY
Start: 2024-04-30 | End: 1900-01-01

## 2024-11-21 ENCOUNTER — RX RENEWAL (OUTPATIENT)
Age: 79
End: 2024-11-21

## 2024-12-03 NOTE — PROGRESS NOTE ADULT - PROBLEM/PLAN-9
DISPLAY PLAN FREE TEXT
4 = No assist / stand by assistance

## 2024-12-11 ENCOUNTER — RX RENEWAL (OUTPATIENT)
Age: 79
End: 2024-12-11

## 2025-02-15 ENCOUNTER — APPOINTMENT (OUTPATIENT)
Dept: MRI IMAGING | Facility: CLINIC | Age: 80
End: 2025-02-15
Payer: MEDICARE

## 2025-02-15 PROCEDURE — 70546 MR ANGIOGRAPH HEAD W/O&W/DYE: CPT | Mod: 26

## 2025-02-15 PROCEDURE — A9585: CPT | Mod: JZ

## 2025-04-14 ENCOUNTER — APPOINTMENT (OUTPATIENT)
Dept: INTERNAL MEDICINE | Facility: CLINIC | Age: 80
End: 2025-04-14
Payer: MEDICARE

## 2025-04-14 DIAGNOSIS — N18.31 CHRONIC KIDNEY DISEASE, STAGE 3A: ICD-10-CM

## 2025-04-14 DIAGNOSIS — R05.8 OTHER SPECIFIED COUGH: ICD-10-CM

## 2025-04-14 DIAGNOSIS — M79.89 OTHER SPECIFIED SOFT TISSUE DISORDERS: ICD-10-CM

## 2025-04-14 DIAGNOSIS — R05.3 CHRONIC COUGH: ICD-10-CM

## 2025-04-14 DIAGNOSIS — E78.2 MIXED HYPERLIPIDEMIA: ICD-10-CM

## 2025-04-14 DIAGNOSIS — I10 ESSENTIAL (PRIMARY) HYPERTENSION: ICD-10-CM

## 2025-04-14 DIAGNOSIS — E78.5 HYPERLIPIDEMIA, UNSPECIFIED: ICD-10-CM

## 2025-04-14 DIAGNOSIS — Z87.898 PERSONAL HISTORY OF OTHER SPECIFIED CONDITIONS: ICD-10-CM

## 2025-04-14 DIAGNOSIS — G81.91 HEMIPLEGIA, UNSPECIFIED AFFECTING RIGHT DOMINANT SIDE: ICD-10-CM

## 2025-04-14 PROCEDURE — G2211 COMPLEX E/M VISIT ADD ON: CPT | Mod: 2W

## 2025-04-14 PROCEDURE — 99213 OFFICE O/P EST LOW 20 MIN: CPT | Mod: 2W

## 2025-05-20 ENCOUNTER — NON-APPOINTMENT (OUTPATIENT)
Age: 80
End: 2025-05-20

## 2025-05-20 ENCOUNTER — APPOINTMENT (OUTPATIENT)
Dept: INTERNAL MEDICINE | Facility: CLINIC | Age: 80
End: 2025-05-20
Payer: MEDICARE

## 2025-05-20 VITALS — BODY MASS INDEX: 35.65 KG/M2 | HEIGHT: 65 IN | WEIGHT: 214 LBS | TEMPERATURE: 97.6 F | OXYGEN SATURATION: 97 %

## 2025-05-20 VITALS — SYSTOLIC BLOOD PRESSURE: 129 MMHG | HEART RATE: 98 BPM | DIASTOLIC BLOOD PRESSURE: 84 MMHG

## 2025-05-20 DIAGNOSIS — E78.2 MIXED HYPERLIPIDEMIA: ICD-10-CM

## 2025-05-20 DIAGNOSIS — R73.03 PREDIABETES.: ICD-10-CM

## 2025-05-20 DIAGNOSIS — E78.5 HYPERLIPIDEMIA, UNSPECIFIED: ICD-10-CM

## 2025-05-20 DIAGNOSIS — N18.31 CHRONIC KIDNEY DISEASE, STAGE 3A: ICD-10-CM

## 2025-05-20 DIAGNOSIS — I63.9 CEREBRAL INFARCTION, UNSPECIFIED: ICD-10-CM

## 2025-05-20 DIAGNOSIS — G81.91 HEMIPLEGIA, UNSPECIFIED AFFECTING RIGHT DOMINANT SIDE: ICD-10-CM

## 2025-05-20 DIAGNOSIS — I10 ESSENTIAL (PRIMARY) HYPERTENSION: ICD-10-CM

## 2025-05-20 DIAGNOSIS — J30.2 OTHER SEASONAL ALLERGIC RHINITIS: ICD-10-CM

## 2025-05-20 PROCEDURE — 99214 OFFICE O/P EST MOD 30 MIN: CPT

## 2025-05-20 PROCEDURE — G2211 COMPLEX E/M VISIT ADD ON: CPT

## 2025-05-20 PROCEDURE — 36415 COLL VENOUS BLD VENIPUNCTURE: CPT

## 2025-05-20 PROCEDURE — 93000 ELECTROCARDIOGRAM COMPLETE: CPT

## 2025-05-20 RX ORDER — CETIRIZINE HYDROCHLORIDE 10 MG/1
10 CAPSULE, LIQUID FILLED ORAL
Qty: 30 | Refills: 3 | Status: ACTIVE | COMMUNITY
Start: 2025-05-20 | End: 1900-01-01

## 2025-05-21 LAB
25(OH)D3 SERPL-MCNC: 27.2 NG/ML
ALBUMIN SERPL ELPH-MCNC: 4.2 G/DL
ALP BLD-CCNC: 200 U/L
ALT SERPL-CCNC: 24 U/L
ANION GAP SERPL CALC-SCNC: 17 MMOL/L
AST SERPL-CCNC: 18 U/L
BILIRUB SERPL-MCNC: 0.2 MG/DL
BUN SERPL-MCNC: 18 MG/DL
CALCIUM SERPL-MCNC: 9.8 MG/DL
CHLORIDE SERPL-SCNC: 102 MMOL/L
CHOLEST SERPL-MCNC: 115 MG/DL
CO2 SERPL-SCNC: 20 MMOL/L
CREAT SERPL-MCNC: 1.17 MG/DL
EGFRCR SERPLBLD CKD-EPI 2021: 47 ML/MIN/1.73M2
ESTIMATED AVERAGE GLUCOSE: 111 MG/DL
GLUCOSE SERPL-MCNC: 146 MG/DL
HBA1C MFR BLD HPLC: 5.5 %
HCT VFR BLD CALC: 39.3 %
HDLC SERPL-MCNC: 42 MG/DL
HGB BLD-MCNC: 12 G/DL
LDLC SERPL-MCNC: 53 MG/DL
MCHC RBC-ENTMCNC: 26.2 PG
MCHC RBC-ENTMCNC: 30.5 G/DL
MCV RBC AUTO: 85.8 FL
NONHDLC SERPL-MCNC: 73 MG/DL
PLATELET # BLD AUTO: 323 K/UL
POTASSIUM SERPL-SCNC: 3.6 MMOL/L
PROT SERPL-MCNC: 7.7 G/DL
RBC # BLD: 4.58 M/UL
RBC # FLD: 15.9 %
SODIUM SERPL-SCNC: 139 MMOL/L
TRIGL SERPL-MCNC: 109 MG/DL
TSH SERPL-ACNC: 3.62 UIU/ML
VIT B12 SERPL-MCNC: 693 PG/ML
WBC # FLD AUTO: 8.27 K/UL

## 2025-06-10 ENCOUNTER — APPOINTMENT (OUTPATIENT)
Dept: INTERNAL MEDICINE | Facility: CLINIC | Age: 80
End: 2025-06-10

## 2025-08-28 ENCOUNTER — APPOINTMENT (OUTPATIENT)
Dept: INTERNAL MEDICINE | Facility: CLINIC | Age: 80
End: 2025-08-28
Payer: MEDICARE

## 2025-08-28 VITALS — HEART RATE: 98 BPM | SYSTOLIC BLOOD PRESSURE: 151 MMHG | DIASTOLIC BLOOD PRESSURE: 84 MMHG

## 2025-08-28 DIAGNOSIS — R60.0 LOCALIZED EDEMA: ICD-10-CM

## 2025-08-28 DIAGNOSIS — N18.2 HYPERTENSIVE CHRONIC KIDNEY DISEASE WITH STAGE 1 THROUGH STAGE 4 CHRONIC KIDNEY DISEASE, OR UNSPECIFIED CHRONIC KIDNEY DISEASE: ICD-10-CM

## 2025-08-28 DIAGNOSIS — I10 ESSENTIAL (PRIMARY) HYPERTENSION: ICD-10-CM

## 2025-08-28 DIAGNOSIS — I12.9 HYPERTENSIVE CHRONIC KIDNEY DISEASE WITH STAGE 1 THROUGH STAGE 4 CHRONIC KIDNEY DISEASE, OR UNSPECIFIED CHRONIC KIDNEY DISEASE: ICD-10-CM

## 2025-08-28 PROCEDURE — 99213 OFFICE O/P EST LOW 20 MIN: CPT

## 2025-08-28 PROCEDURE — 36415 COLL VENOUS BLD VENIPUNCTURE: CPT

## 2025-08-29 LAB
ANION GAP SERPL CALC-SCNC: 16 MMOL/L
BUN SERPL-MCNC: 13 MG/DL
CALCIUM SERPL-MCNC: 9.8 MG/DL
CHLORIDE SERPL-SCNC: 103 MMOL/L
CO2 SERPL-SCNC: 22 MMOL/L
CREAT SERPL-MCNC: 1.02 MG/DL
EGFRCR SERPLBLD CKD-EPI 2021: 56 ML/MIN/1.73M2
GLUCOSE SERPL-MCNC: 116 MG/DL
POTASSIUM SERPL-SCNC: 3.7 MMOL/L
SODIUM SERPL-SCNC: 142 MMOL/L

## 2025-08-30 ENCOUNTER — APPOINTMENT (OUTPATIENT)
Dept: ULTRASOUND IMAGING | Facility: CLINIC | Age: 80
End: 2025-08-30
Payer: MEDICARE

## 2025-08-30 ENCOUNTER — OUTPATIENT (OUTPATIENT)
Dept: OUTPATIENT SERVICES | Facility: HOSPITAL | Age: 80
LOS: 1 days | End: 2025-08-30
Payer: MEDICARE

## 2025-08-30 DIAGNOSIS — R60.0 LOCALIZED EDEMA: ICD-10-CM

## 2025-08-30 PROCEDURE — 93971 EXTREMITY STUDY: CPT

## 2025-08-30 PROCEDURE — 93971 EXTREMITY STUDY: CPT | Mod: 26,RT
